# Patient Record
Sex: MALE | Race: WHITE | NOT HISPANIC OR LATINO | Employment: OTHER | URBAN - METROPOLITAN AREA
[De-identification: names, ages, dates, MRNs, and addresses within clinical notes are randomized per-mention and may not be internally consistent; named-entity substitution may affect disease eponyms.]

---

## 2017-01-19 ENCOUNTER — GENERIC CONVERSION - ENCOUNTER (OUTPATIENT)
Dept: OTHER | Facility: OTHER | Age: 57
End: 2017-01-19

## 2018-10-17 LAB
LEFT EYE DIABETIC RETINOPATHY: NORMAL
RIGHT EYE DIABETIC RETINOPATHY: NORMAL
SEVERITY (EYE EXAM): NORMAL

## 2019-03-05 ENCOUNTER — OFFICE VISIT (OUTPATIENT)
Dept: INTERNAL MEDICINE CLINIC | Facility: CLINIC | Age: 59
End: 2019-03-05
Payer: COMMERCIAL

## 2019-03-05 VITALS
HEART RATE: 103 BPM | SYSTOLIC BLOOD PRESSURE: 170 MMHG | OXYGEN SATURATION: 92 % | BODY MASS INDEX: 41.48 KG/M2 | WEIGHT: 225.4 LBS | DIASTOLIC BLOOD PRESSURE: 90 MMHG | HEIGHT: 62 IN

## 2019-03-05 DIAGNOSIS — Z12.5 PROSTATE CANCER SCREENING: ICD-10-CM

## 2019-03-05 DIAGNOSIS — H40.89 OTHER GLAUCOMA OF RIGHT EYE: ICD-10-CM

## 2019-03-05 DIAGNOSIS — Z12.11 COLON CANCER SCREENING: ICD-10-CM

## 2019-03-05 DIAGNOSIS — I11.9 HYPERTENSIVE HEART DISEASE WITHOUT HEART FAILURE: ICD-10-CM

## 2019-03-05 DIAGNOSIS — R21 SKIN RASH: ICD-10-CM

## 2019-03-05 DIAGNOSIS — I10 ESSENTIAL HYPERTENSION: ICD-10-CM

## 2019-03-05 DIAGNOSIS — E11.69 TYPE 2 DIABETES MELLITUS WITH OTHER SPECIFIED COMPLICATION, WITHOUT LONG-TERM CURRENT USE OF INSULIN (HCC): Primary | ICD-10-CM

## 2019-03-05 DIAGNOSIS — J45.20 MILD INTERMITTENT ASTHMA WITHOUT COMPLICATION: ICD-10-CM

## 2019-03-05 PROBLEM — J45.909 ASTHMA: Status: ACTIVE | Noted: 2019-03-05

## 2019-03-05 PROBLEM — E11.9 DIABETES MELLITUS (HCC): Status: ACTIVE | Noted: 2019-03-05

## 2019-03-05 PROCEDURE — 93000 ELECTROCARDIOGRAM COMPLETE: CPT | Performed by: INTERNAL MEDICINE

## 2019-03-05 PROCEDURE — 99204 OFFICE O/P NEW MOD 45 MIN: CPT | Performed by: INTERNAL MEDICINE

## 2019-03-05 PROCEDURE — 3725F SCREEN DEPRESSION PERFORMED: CPT | Performed by: INTERNAL MEDICINE

## 2019-03-05 RX ORDER — MONTELUKAST SODIUM 10 MG/1
10 TABLET ORAL
COMMUNITY
End: 2019-03-06 | Stop reason: SDUPTHER

## 2019-03-05 RX ORDER — METOPROLOL SUCCINATE 50 MG/1
50 TABLET, EXTENDED RELEASE ORAL DAILY
Qty: 90 TABLET | Refills: 3 | Status: SHIPPED | OUTPATIENT
Start: 2019-03-05 | End: 2019-03-11 | Stop reason: HOSPADM

## 2019-03-05 RX ORDER — ASPIRIN 81 MG/1
81 TABLET ORAL DAILY
COMMUNITY

## 2019-03-05 RX ORDER — OMEGA-3-ACID ETHYL ESTERS 1 G/1
2 CAPSULE, LIQUID FILLED ORAL 2 TIMES DAILY
COMMUNITY
End: 2019-04-22 | Stop reason: SDUPTHER

## 2019-03-05 RX ORDER — SILDENAFIL CITRATE 20 MG/1
20 TABLET ORAL 3 TIMES DAILY
Status: ON HOLD | COMMUNITY
End: 2019-03-11 | Stop reason: SDUPTHER

## 2019-03-05 RX ORDER — MELATONIN
1000 DAILY
COMMUNITY
End: 2019-03-06 | Stop reason: SDUPTHER

## 2019-03-05 RX ORDER — LISINOPRIL 20 MG/1
20 TABLET ORAL DAILY
COMMUNITY
End: 2019-03-11 | Stop reason: HOSPADM

## 2019-03-05 NOTE — PATIENT INSTRUCTIONS
A patient who feels well with the exception of rash  However, on examination, blood pressure noted to be uncontrolled  EKG suggests LVH  Recommendations are laboratory data to look at diabetic care along with cholesterol panel, to begin metoprolol 50 mg daily, in to increase lisinopril  Lisinopril should go from 1 tablet daily to 1-1/2 tablets daily for a week, then, 2 tablets daily  Revisit here in 2-3 weeks

## 2019-03-05 NOTE — PROGRESS NOTES
Assessment/Plan:       Diagnoses and all orders for this visit:    Type 2 diabetes mellitus with other specified complication, without long-term current use of insulin (HCC)  -     CBC and differential; Future  -     Lipid Panel with Direct LDL reflex; Future  -     Comprehensive metabolic panel; Future  -     TSH, 3rd generation with Free T4 reflex; Future  -     Urinalysis with reflex to microscopic  -     Microalbumin / creatinine urine ratio  -     Hemoglobin A1C; Future  -     POCT ECG  -     CBC and differential  -     Lipid Panel with Direct LDL reflex  -     Comprehensive metabolic panel  -     TSH, 3rd generation with Free T4 reflex  -     Hemoglobin A1C    Mild intermittent asthma without complication    Essential hypertension  -     CBC and differential; Future  -     Lipid Panel with Direct LDL reflex; Future  -     Comprehensive metabolic panel; Future  -     TSH, 3rd generation with Free T4 reflex; Future  -     Urinalysis with reflex to microscopic  -     Microalbumin / creatinine urine ratio  -     Hemoglobin A1C; Future  -     POCT ECG  -     CBC and differential  -     Lipid Panel with Direct LDL reflex  -     Comprehensive metabolic panel  -     TSH, 3rd generation with Free T4 reflex  -     Hemoglobin A1C    Skin rash    Prostate cancer screening  -     PSA, Total Screen; Future    Colon cancer screening    Other glaucoma of right eye    Other orders  -     metFORMIN (GLUCOPHAGE) 500 mg tablet; Take 500 mg by mouth 2 (two) times a day with meals  -     omega-3-acid ethyl esters (LOVAZA) 1 g capsule; Take 2 g by mouth 2 (two) times a day  -     lisinopril (ZESTRIL) 20 mg tablet; Take 20 mg by mouth daily  -     montelukast (SINGULAIR) 10 mg tablet; Take 10 mg by mouth daily at bedtime  -     aspirin (ECOTRIN LOW STRENGTH) 81 mg EC tablet; Take 81 mg by mouth daily  -     cholecalciferol (VITAMIN D3) 1,000 units tablet; Take 1,000 Units by mouth daily  -     sildenafil (REVATIO) 20 mg tablet;  Take 20 mg by mouth 3 (three) times a day          Patient Instructions   A patient who feels well with the exception of rash  However, on examination, blood pressure noted to be uncontrolled  EKG suggests LVH  Recommendations are laboratory data to look at diabetic care along with cholesterol panel, to begin metoprolol 50 mg daily, in to increase lisinopril  Lisinopril should go from 1 tablet daily to 1-1/2 tablets daily for a week, then, 2 tablets daily  Revisit here in 2-3 weeks  Subjective:      Patient ID: August Renee is a 60 y o  male  Hypertension, diabetes, dyslipidemia, and obesity    A 62-year-old male with the above issues  Although he is asymptomatic, there is work to be done  Blood pressure is uncontrolled  He is on lisinopril 20 mg once daily and I get 180/100  Fortunately, no immediate symptoms are referable to this in the sense of chest pain orthopnea or PND  However, he describes feeling blood coursing through his arteries and his pulse is a bit fast; I think he actually does feel that  He has a hyperdynamic circulation  Rare asthmatic episodes none recently  He does use Singulair as a preventive    , 3 adult children are alive and well  A retired  and   History of gallbladder surgery and bilateral shoulder surgeries  Colonoscopy 10 years ago  Recent hemoglobin A1c reported to be 7    In addition to chronic problems, he developed a scattered maculopapular rash involving his arms and legs about a week ago at the tail end of a trip to [place]  No insect stings or any other provocative agent reported  This area is itchy but he has no systemic symptoms    Family history not very contributory  Both parents are  but with unusual incidence  Mother apparently  after leg trauma provoked DVT and fatal PE  Father   Found dead in his backyard, frozen    Uncertain circumstances although the patient describes it as having had a heart attack  The following portions of the patient's history were reviewed and updated as appropriate:   He has a past medical history of Asthma, Diabetes mellitus ([address] ), and Hypertension  ,  does not have any pertinent problems on file  ,   has a past surgical history that includes Shoulder surgery (2015)  ,  family history includes Cancer in his father; Diabetes in his mother; Hypertension in his mother  ,   reports that he has never smoked  He has never used smokeless tobacco  He reports that he drinks alcohol  He reports that he does not use drugs  ,  has No Known Allergies     Current Outpatient Medications   Medication Sig Dispense Refill    aspirin (ECOTRIN LOW STRENGTH) 81 mg EC tablet Take 81 mg by mouth daily      cholecalciferol (VITAMIN D3) 1,000 units tablet Take 1,000 Units by mouth daily      lisinopril (ZESTRIL) 20 mg tablet Take 20 mg by mouth daily      metFORMIN (GLUCOPHAGE) 500 mg tablet Take 500 mg by mouth 2 (two) times a day with meals      montelukast (SINGULAIR) 10 mg tablet Take 10 mg by mouth daily at bedtime      omega-3-acid ethyl esters (LOVAZA) 1 g capsule Take 2 g by mouth 2 (two) times a day      sildenafil (REVATIO) 20 mg tablet Take 20 mg by mouth 3 (three) times a day       No current facility-administered medications for this visit  Review of Systems   Constitutional: Negative for chills and fever  HENT: Negative for sore throat and trouble swallowing  Eyes: Negative for pain  Respiratory: Negative for cough and shortness of breath  Cardiovascular: Negative for chest pain and palpitations  Gastrointestinal: Negative for abdominal pain, blood in stool, diarrhea, nausea and vomiting  Endocrine: Negative for cold intolerance and heat intolerance  Genitourinary: Negative for dysuria, frequency and testicular pain  Musculoskeletal: Negative for joint swelling  Skin: Positive for rash  Allergic/Immunologic: Negative for immunocompromised state  Neurological: Negative for dizziness, syncope and headaches  Hematological: Negative for adenopathy  Does not bruise/bleed easily  Psychiatric/Behavioral: Negative for dysphoric mood  The patient is not nervous/anxious  Objective:  Vitals:    03/05/19 1506   BP: 170/90   Pulse: 103   SpO2: 92%      Physical Exam   Constitutional: He is oriented to person, place, and time  He appears well-developed and well-nourished  An obese male patient who appears to be stated age   HENT:   Head: Normocephalic and atraumatic  Eyes: Pupils are equal, round, and reactive to light  Conjunctivae are normal    Neck: Normal range of motion  No thyromegaly present  Cardiovascular: Normal rate, regular rhythm and normal heart sounds  No murmur heard  Pulmonary/Chest: Effort normal  No respiratory distress  He has no wheezes  He has no rales  Abdominal: Soft  There is no tenderness  Genitourinary: Penis normal  Right testis shows no mass and no tenderness  Left testis shows no mass and no tenderness  Musculoskeletal: Normal range of motion  He exhibits no deformity  Lymphadenopathy:     He has no cervical adenopathy  Neurological: He is alert and oriented to person, place, and time  Skin: Skin is warm and dry  Rash noted  Scattered maculopapular lesions on the arms and legs  Irregular port wine discoloration of the left foot and medial left ankle region   Psychiatric: He has a normal mood and affect   His behavior is normal  Judgment and thought content normal

## 2019-03-06 DIAGNOSIS — J45.20 MILD INTERMITTENT ASTHMA WITHOUT COMPLICATION: Primary | ICD-10-CM

## 2019-03-06 DIAGNOSIS — E55.9 VITAMIN D DEFICIENCY: ICD-10-CM

## 2019-03-06 RX ORDER — MONTELUKAST SODIUM 10 MG/1
10 TABLET ORAL
Qty: 90 TABLET | Refills: 1 | Status: SHIPPED | OUTPATIENT
Start: 2019-03-06 | End: 2019-04-04 | Stop reason: SDUPTHER

## 2019-03-06 RX ORDER — MELATONIN
1000 DAILY
Qty: 90 TABLET | Refills: 1 | Status: SHIPPED | OUTPATIENT
Start: 2019-03-06 | End: 2021-11-29

## 2019-03-08 LAB
ALBUMIN SERPL-MCNC: 4.3 G/DL (ref 3.5–5.5)
ALBUMIN/GLOB SERPL: 1.5 {RATIO} (ref 1.2–2.2)
ALP SERPL-CCNC: 54 IU/L (ref 39–117)
ALT SERPL-CCNC: 21 IU/L (ref 0–44)
AST SERPL-CCNC: 18 IU/L (ref 0–40)
BASOPHILS # BLD AUTO: 0 X10E3/UL (ref 0–0.2)
BASOPHILS NFR BLD AUTO: 0 %
BILIRUB SERPL-MCNC: 0.3 MG/DL (ref 0–1.2)
BUN SERPL-MCNC: 14 MG/DL (ref 6–24)
BUN/CREAT SERPL: 11 (ref 9–20)
CALCIUM SERPL-MCNC: 9.7 MG/DL (ref 8.7–10.2)
CHLORIDE SERPL-SCNC: 101 MMOL/L (ref 96–106)
CHOLEST SERPL-MCNC: 213 MG/DL (ref 100–199)
CO2 SERPL-SCNC: 25 MMOL/L (ref 20–29)
CREAT SERPL-MCNC: 1.25 MG/DL (ref 0.76–1.27)
EOSINOPHIL # BLD AUTO: 0.6 X10E3/UL (ref 0–0.4)
EOSINOPHIL NFR BLD AUTO: 7 %
ERYTHROCYTE [DISTWIDTH] IN BLOOD BY AUTOMATED COUNT: 13.4 % (ref 12.3–15.4)
EST. AVERAGE GLUCOSE BLD GHB EST-MCNC: 200 MG/DL
GLOBULIN SER-MCNC: 2.8 G/DL (ref 1.5–4.5)
GLUCOSE SERPL-MCNC: 182 MG/DL (ref 65–99)
HBA1C MFR BLD: 8.6 % (ref 4.8–5.6)
HCT VFR BLD AUTO: 45.4 % (ref 37.5–51)
HDLC SERPL-MCNC: 34 MG/DL
HGB BLD-MCNC: 15 G/DL (ref 13–17.7)
IMM GRANULOCYTES # BLD: 0 X10E3/UL (ref 0–0.1)
IMM GRANULOCYTES NFR BLD: 0 %
LDLC SERPL CALC-MCNC: 113 MG/DL (ref 0–99)
LDLC/HDLC SERPL: 3.3 RATIO (ref 0–3.6)
LYMPHOCYTES # BLD AUTO: 3.3 X10E3/UL (ref 0.7–3.1)
LYMPHOCYTES NFR BLD AUTO: 41 %
MCH RBC QN AUTO: 30.1 PG (ref 26.6–33)
MCHC RBC AUTO-ENTMCNC: 33 G/DL (ref 31.5–35.7)
MCV RBC AUTO: 91 FL (ref 79–97)
MONOCYTES # BLD AUTO: 0.5 X10E3/UL (ref 0.1–0.9)
MONOCYTES NFR BLD AUTO: 7 %
NEUTROPHILS # BLD AUTO: 3.7 X10E3/UL (ref 1.4–7)
NEUTROPHILS NFR BLD AUTO: 45 %
PLATELET # BLD AUTO: 277 X10E3/UL (ref 150–379)
POTASSIUM SERPL-SCNC: 4.8 MMOL/L (ref 3.5–5.2)
PROT SERPL-MCNC: 7.1 G/DL (ref 6–8.5)
PSA SERPL-MCNC: 3.5 NG/ML (ref 0–4)
RBC # BLD AUTO: 4.98 X10E6/UL (ref 4.14–5.8)
SL AMB EGFR AFRICAN AMERICAN: 72 ML/MIN/1.73
SL AMB EGFR NON AFRICAN AMERICAN: 63 ML/MIN/1.73
SL AMB VLDL CHOLESTEROL CALC: 66 MG/DL (ref 5–40)
SODIUM SERPL-SCNC: 138 MMOL/L (ref 134–144)
TRIGL SERPL-MCNC: 332 MG/DL (ref 0–149)
TSH SERPL DL<=0.005 MIU/L-ACNC: 1.78 UIU/ML (ref 0.45–4.5)
WBC # BLD AUTO: 8.1 X10E3/UL (ref 3.4–10.8)

## 2019-03-09 ENCOUNTER — APPOINTMENT (EMERGENCY)
Dept: RADIOLOGY | Facility: HOSPITAL | Age: 59
End: 2019-03-09
Payer: COMMERCIAL

## 2019-03-09 ENCOUNTER — TELEPHONE (OUTPATIENT)
Dept: OTHER | Facility: OTHER | Age: 59
End: 2019-03-09

## 2019-03-09 ENCOUNTER — HOSPITAL ENCOUNTER (OUTPATIENT)
Facility: HOSPITAL | Age: 59
Setting detail: OBSERVATION
Discharge: HOME | End: 2019-03-11
Attending: EMERGENCY MEDICINE | Admitting: INTERNAL MEDICINE
Payer: COMMERCIAL

## 2019-03-09 DIAGNOSIS — I10 ESSENTIAL HYPERTENSION: ICD-10-CM

## 2019-03-09 DIAGNOSIS — R07.9 CHEST PAIN, UNSPECIFIED: ICD-10-CM

## 2019-03-09 DIAGNOSIS — R07.9 CHEST PAIN, UNSPECIFIED TYPE: Primary | ICD-10-CM

## 2019-03-09 LAB
ALBUMIN SERPL BCP-MCNC: 4 G/DL (ref 3.5–5)
ALP SERPL-CCNC: 54 U/L (ref 46–116)
ALT SERPL W P-5'-P-CCNC: 32 U/L (ref 12–78)
ANION GAP SERPL CALCULATED.3IONS-SCNC: 8 MMOL/L (ref 4–13)
AST SERPL W P-5'-P-CCNC: 15 U/L (ref 5–45)
ATRIAL RATE: 69 BPM
BASOPHILS # BLD AUTO: 0.05 THOUSANDS/ΜL (ref 0–0.1)
BASOPHILS NFR BLD AUTO: 1 % (ref 0–1)
BILIRUB SERPL-MCNC: 0.2 MG/DL (ref 0.2–1)
BUN SERPL-MCNC: 16 MG/DL (ref 5–25)
CALCIUM SERPL-MCNC: 9.8 MG/DL (ref 8.3–10.1)
CHLORIDE SERPL-SCNC: 102 MMOL/L (ref 100–108)
CO2 SERPL-SCNC: 30 MMOL/L (ref 21–32)
CREAT SERPL-MCNC: 1.26 MG/DL (ref 0.6–1.3)
EOSINOPHIL # BLD AUTO: 0.34 THOUSAND/ΜL (ref 0–0.61)
EOSINOPHIL NFR BLD AUTO: 4 % (ref 0–6)
ERYTHROCYTE [DISTWIDTH] IN BLOOD BY AUTOMATED COUNT: 12.6 % (ref 11.6–15.1)
GFR SERPL CREATININE-BSD FRML MDRD: 62 ML/MIN/1.73SQ M
GLUCOSE SERPL-MCNC: 142 MG/DL (ref 65–140)
HCT VFR BLD AUTO: 41.6 % (ref 36.5–49.3)
HGB BLD-MCNC: 13.9 G/DL (ref 12–17)
IMM GRANULOCYTES # BLD AUTO: 0.01 THOUSAND/UL (ref 0–0.2)
IMM GRANULOCYTES NFR BLD AUTO: 0 % (ref 0–2)
LYMPHOCYTES # BLD AUTO: 3.38 THOUSANDS/ΜL (ref 0.6–4.47)
LYMPHOCYTES NFR BLD AUTO: 44 % (ref 14–44)
MCH RBC QN AUTO: 30.2 PG (ref 26.8–34.3)
MCHC RBC AUTO-ENTMCNC: 33.4 G/DL (ref 31.4–37.4)
MCV RBC AUTO: 90 FL (ref 82–98)
MONOCYTES # BLD AUTO: 0.6 THOUSAND/ΜL (ref 0.17–1.22)
MONOCYTES NFR BLD AUTO: 8 % (ref 4–12)
NEUTROPHILS # BLD AUTO: 3.33 THOUSANDS/ΜL (ref 1.85–7.62)
NEUTS SEG NFR BLD AUTO: 43 % (ref 43–75)
NRBC BLD AUTO-RTO: 0 /100 WBCS
P AXIS: 62 DEGREES
PLATELET # BLD AUTO: 244 THOUSANDS/UL (ref 149–390)
PMV BLD AUTO: 10.7 FL (ref 8.9–12.7)
POTASSIUM SERPL-SCNC: 3.8 MMOL/L (ref 3.5–5.3)
PR INTERVAL: 154 MS
PROT SERPL-MCNC: 7.7 G/DL (ref 6.4–8.2)
QRS AXIS: 78 DEGREES
QRSD INTERVAL: 106 MS
QT INTERVAL: 406 MS
QTC INTERVAL: 435 MS
RBC # BLD AUTO: 4.61 MILLION/UL (ref 3.88–5.62)
SODIUM SERPL-SCNC: 140 MMOL/L (ref 136–145)
T WAVE AXIS: 57 DEGREES
TROPONIN I SERPL-MCNC: <0.02 NG/ML
VENTRICULAR RATE: 69 BPM
WBC # BLD AUTO: 7.71 THOUSAND/UL (ref 4.31–10.16)

## 2019-03-09 PROCEDURE — 93005 ELECTROCARDIOGRAM TRACING: CPT

## 2019-03-09 PROCEDURE — 99285 EMERGENCY DEPT VISIT HI MDM: CPT

## 2019-03-09 PROCEDURE — 82948 REAGENT STRIP/BLOOD GLUCOSE: CPT

## 2019-03-09 PROCEDURE — 85025 COMPLETE CBC W/AUTO DIFF WBC: CPT

## 2019-03-09 PROCEDURE — 84484 ASSAY OF TROPONIN QUANT: CPT

## 2019-03-09 PROCEDURE — 99220 PR INITIAL OBSERVATION CARE/DAY 70 MINUTES: CPT | Performed by: INTERNAL MEDICINE

## 2019-03-09 PROCEDURE — 80053 COMPREHEN METABOLIC PANEL: CPT

## 2019-03-09 PROCEDURE — 36415 COLL VENOUS BLD VENIPUNCTURE: CPT

## 2019-03-09 PROCEDURE — 71046 X-RAY EXAM CHEST 2 VIEWS: CPT

## 2019-03-09 PROCEDURE — 93010 ELECTROCARDIOGRAM REPORT: CPT | Performed by: INTERNAL MEDICINE

## 2019-03-09 RX ORDER — ASPIRIN 81 MG/1
324 TABLET, CHEWABLE ORAL ONCE
Status: COMPLETED | OUTPATIENT
Start: 2019-03-09 | End: 2019-03-09

## 2019-03-09 RX ORDER — LISINOPRIL 20 MG/1
20 TABLET ORAL DAILY
Status: DISCONTINUED | OUTPATIENT
Start: 2019-03-10 | End: 2019-03-10

## 2019-03-09 RX ORDER — ASPIRIN 81 MG/1
81 TABLET ORAL DAILY
Status: DISCONTINUED | OUTPATIENT
Start: 2019-03-10 | End: 2019-03-11 | Stop reason: HOSPADM

## 2019-03-09 RX ORDER — ONDANSETRON 2 MG/ML
4 INJECTION INTRAMUSCULAR; INTRAVENOUS EVERY 6 HOURS PRN
Status: DISCONTINUED | OUTPATIENT
Start: 2019-03-09 | End: 2019-03-11 | Stop reason: HOSPADM

## 2019-03-09 RX ORDER — CHLORAL HYDRATE 500 MG
1000 CAPSULE ORAL DAILY
Status: DISCONTINUED | OUTPATIENT
Start: 2019-03-10 | End: 2019-03-11 | Stop reason: HOSPADM

## 2019-03-09 RX ORDER — ACETAMINOPHEN 325 MG/1
650 TABLET ORAL EVERY 4 HOURS PRN
Status: DISCONTINUED | OUTPATIENT
Start: 2019-03-09 | End: 2019-03-11 | Stop reason: HOSPADM

## 2019-03-09 RX ORDER — DOCUSATE SODIUM 100 MG/1
100 CAPSULE, LIQUID FILLED ORAL 2 TIMES DAILY
Status: DISCONTINUED | OUTPATIENT
Start: 2019-03-09 | End: 2019-03-11 | Stop reason: HOSPADM

## 2019-03-09 RX ORDER — MAGNESIUM HYDROXIDE/ALUMINUM HYDROXICE/SIMETHICONE 120; 1200; 1200 MG/30ML; MG/30ML; MG/30ML
30 SUSPENSION ORAL EVERY 6 HOURS PRN
Status: DISCONTINUED | OUTPATIENT
Start: 2019-03-09 | End: 2019-03-11 | Stop reason: HOSPADM

## 2019-03-09 RX ORDER — HYDRALAZINE HYDROCHLORIDE 20 MG/ML
5 INJECTION INTRAMUSCULAR; INTRAVENOUS EVERY 6 HOURS PRN
Status: DISCONTINUED | OUTPATIENT
Start: 2019-03-09 | End: 2019-03-11 | Stop reason: HOSPADM

## 2019-03-09 RX ORDER — ATORVASTATIN CALCIUM 40 MG/1
40 TABLET, FILM COATED ORAL EVERY EVENING
Status: DISCONTINUED | OUTPATIENT
Start: 2019-03-09 | End: 2019-03-11 | Stop reason: HOSPADM

## 2019-03-09 RX ORDER — MONTELUKAST SODIUM 10 MG/1
10 TABLET ORAL
Status: DISCONTINUED | OUTPATIENT
Start: 2019-03-09 | End: 2019-03-11 | Stop reason: HOSPADM

## 2019-03-09 RX ORDER — NITROGLYCERIN 0.4 MG/1
0.4 TABLET SUBLINGUAL ONCE
Status: COMPLETED | OUTPATIENT
Start: 2019-03-09 | End: 2019-03-09

## 2019-03-09 RX ORDER — METOPROLOL SUCCINATE 50 MG/1
50 TABLET, EXTENDED RELEASE ORAL DAILY
Status: DISCONTINUED | OUTPATIENT
Start: 2019-03-10 | End: 2019-03-11 | Stop reason: HOSPADM

## 2019-03-09 RX ADMIN — ATORVASTATIN CALCIUM 40 MG: 40 TABLET, FILM COATED ORAL at 23:27

## 2019-03-09 RX ADMIN — NITROGLYCERIN 0.4 MG: 0.4 TABLET SUBLINGUAL at 20:56

## 2019-03-09 RX ADMIN — ASPIRIN 81 MG 324 MG: 81 TABLET ORAL at 20:52

## 2019-03-10 ENCOUNTER — APPOINTMENT (OUTPATIENT)
Dept: NON INVASIVE DIAGNOSTICS | Facility: HOSPITAL | Age: 59
End: 2019-03-10
Payer: COMMERCIAL

## 2019-03-10 LAB
ANION GAP SERPL CALCULATED.3IONS-SCNC: 11 MMOL/L (ref 4–13)
ATRIAL RATE: 55 BPM
ATRIAL RATE: 59 BPM
BUN SERPL-MCNC: 15 MG/DL (ref 5–25)
CALCIUM SERPL-MCNC: 8.8 MG/DL (ref 8.3–10.1)
CHLORIDE SERPL-SCNC: 105 MMOL/L (ref 100–108)
CHOLEST SERPL-MCNC: 179 MG/DL (ref 50–200)
CO2 SERPL-SCNC: 27 MMOL/L (ref 21–32)
CREAT SERPL-MCNC: 1.13 MG/DL (ref 0.6–1.3)
ERYTHROCYTE [DISTWIDTH] IN BLOOD BY AUTOMATED COUNT: 12.6 % (ref 11.6–15.1)
GFR SERPL CREATININE-BSD FRML MDRD: 71 ML/MIN/1.73SQ M
GLUCOSE SERPL-MCNC: 120 MG/DL (ref 65–140)
GLUCOSE SERPL-MCNC: 143 MG/DL (ref 65–140)
GLUCOSE SERPL-MCNC: 177 MG/DL (ref 65–140)
GLUCOSE SERPL-MCNC: 183 MG/DL (ref 65–140)
GLUCOSE SERPL-MCNC: 215 MG/DL (ref 65–140)
GLUCOSE SERPL-MCNC: 229 MG/DL (ref 65–140)
HCT VFR BLD AUTO: 38.3 % (ref 36.5–49.3)
HDLC SERPL-MCNC: 32 MG/DL (ref 40–60)
HGB BLD-MCNC: 12.9 G/DL (ref 12–17)
LDLC SERPL CALC-MCNC: 92 MG/DL (ref 0–100)
MAGNESIUM SERPL-MCNC: 1.7 MG/DL (ref 1.6–2.6)
MCH RBC QN AUTO: 30.4 PG (ref 26.8–34.3)
MCHC RBC AUTO-ENTMCNC: 33.7 G/DL (ref 31.4–37.4)
MCV RBC AUTO: 90 FL (ref 82–98)
NONHDLC SERPL-MCNC: 147 MG/DL
P AXIS: 59 DEGREES
P AXIS: 66 DEGREES
PHOSPHATE SERPL-MCNC: 3.5 MG/DL (ref 2.7–4.5)
PLATELET # BLD AUTO: 216 THOUSANDS/UL (ref 149–390)
PMV BLD AUTO: 10.6 FL (ref 8.9–12.7)
POTASSIUM SERPL-SCNC: 3.7 MMOL/L (ref 3.5–5.3)
PR INTERVAL: 158 MS
PR INTERVAL: 178 MS
QRS AXIS: 80 DEGREES
QRS AXIS: 82 DEGREES
QRSD INTERVAL: 102 MS
QRSD INTERVAL: 116 MS
QT INTERVAL: 416 MS
QT INTERVAL: 418 MS
QTC INTERVAL: 397 MS
QTC INTERVAL: 413 MS
RBC # BLD AUTO: 4.25 MILLION/UL (ref 3.88–5.62)
SODIUM SERPL-SCNC: 143 MMOL/L (ref 136–145)
T WAVE AXIS: 63 DEGREES
T WAVE AXIS: 66 DEGREES
TRIGL SERPL-MCNC: 275 MG/DL
TROPONIN I SERPL-MCNC: <0.02 NG/ML
TROPONIN I SERPL-MCNC: <0.02 NG/ML
VENTRICULAR RATE: 55 BPM
VENTRICULAR RATE: 59 BPM
WBC # BLD AUTO: 7.59 THOUSAND/UL (ref 4.31–10.16)

## 2019-03-10 PROCEDURE — 99243 OFF/OP CNSLTJ NEW/EST LOW 30: CPT | Performed by: INTERNAL MEDICINE

## 2019-03-10 PROCEDURE — 93010 ELECTROCARDIOGRAM REPORT: CPT | Performed by: INTERNAL MEDICINE

## 2019-03-10 PROCEDURE — 83735 ASSAY OF MAGNESIUM: CPT | Performed by: NURSE PRACTITIONER

## 2019-03-10 PROCEDURE — 93005 ELECTROCARDIOGRAM TRACING: CPT

## 2019-03-10 PROCEDURE — 84484 ASSAY OF TROPONIN QUANT: CPT | Performed by: NURSE PRACTITIONER

## 2019-03-10 PROCEDURE — 82948 REAGENT STRIP/BLOOD GLUCOSE: CPT

## 2019-03-10 PROCEDURE — 84100 ASSAY OF PHOSPHORUS: CPT | Performed by: NURSE PRACTITIONER

## 2019-03-10 PROCEDURE — 80061 LIPID PANEL: CPT | Performed by: NURSE PRACTITIONER

## 2019-03-10 PROCEDURE — 4010F ACE/ARB THERAPY RXD/TAKEN: CPT | Performed by: INTERNAL MEDICINE

## 2019-03-10 PROCEDURE — 85027 COMPLETE CBC AUTOMATED: CPT | Performed by: NURSE PRACTITIONER

## 2019-03-10 PROCEDURE — 80048 BASIC METABOLIC PNL TOTAL CA: CPT | Performed by: NURSE PRACTITIONER

## 2019-03-10 PROCEDURE — 99232 SBSQ HOSP IP/OBS MODERATE 35: CPT | Performed by: FAMILY MEDICINE

## 2019-03-10 RX ORDER — LISINOPRIL 20 MG/1
40 TABLET ORAL DAILY
Status: DISCONTINUED | OUTPATIENT
Start: 2019-03-11 | End: 2019-03-10

## 2019-03-10 RX ORDER — LISINOPRIL 20 MG/1
20 TABLET ORAL DAILY
Status: DISCONTINUED | OUTPATIENT
Start: 2019-03-11 | End: 2019-03-10

## 2019-03-10 RX ORDER — LISINOPRIL 20 MG/1
20 TABLET ORAL ONCE
Status: COMPLETED | OUTPATIENT
Start: 2019-03-10 | End: 2019-03-10

## 2019-03-10 RX ORDER — LISINOPRIL 20 MG/1
40 TABLET ORAL DAILY
Status: DISCONTINUED | OUTPATIENT
Start: 2019-03-11 | End: 2019-03-11 | Stop reason: HOSPADM

## 2019-03-10 RX ADMIN — MONTELUKAST SODIUM 10 MG: 10 TABLET, FILM COATED ORAL at 21:49

## 2019-03-10 RX ADMIN — ENOXAPARIN SODIUM 40 MG: 40 INJECTION SUBCUTANEOUS at 14:01

## 2019-03-10 RX ADMIN — INSULIN LISPRO 1 UNITS: 100 INJECTION, SOLUTION INTRAVENOUS; SUBCUTANEOUS at 21:50

## 2019-03-10 RX ADMIN — LISINOPRIL 20 MG: 20 TABLET ORAL at 08:20

## 2019-03-10 RX ADMIN — METOPROLOL SUCCINATE 50 MG: 50 TABLET, EXTENDED RELEASE ORAL at 08:20

## 2019-03-10 RX ADMIN — ATORVASTATIN CALCIUM 40 MG: 40 TABLET, FILM COATED ORAL at 17:22

## 2019-03-10 RX ADMIN — ASPIRIN 81 MG: 81 TABLET, COATED ORAL at 08:20

## 2019-03-10 RX ADMIN — Medication 1000 MG: at 08:20

## 2019-03-10 RX ADMIN — INSULIN LISPRO 2 UNITS: 100 INJECTION, SOLUTION INTRAVENOUS; SUBCUTANEOUS at 17:21

## 2019-03-10 RX ADMIN — LISINOPRIL 20 MG: 20 TABLET ORAL at 17:22

## 2019-03-10 RX ADMIN — INSULIN LISPRO 2 UNITS: 100 INJECTION, SOLUTION INTRAVENOUS; SUBCUTANEOUS at 14:02

## 2019-03-10 NOTE — TELEPHONE ENCOUNTER
Kavita Bradley 1960  CONFIDENTIALTY NOTICE: This fax transmission is intended only for the addressee  It contains information that is legally privileged,  confidential or otherwise protected from use or disclosure  If you are not the intended recipient, you are strictly prohibited from reviewing,  disclosing, copying using or disseminating any of this information or taking any action in reliance on or regarding this information  If you have  received this fax in error, please notify us immediately by telephone so that we can arrange for its return to us  Page:   Call Id: [id number]  Health Call  Standard Call Report  Health Call  Patient Name: Kavita Bradley  Gender: Male  : 1960  Age: 60 Y 2 M 16 D  Return Phone  Number: [phone] (Current)  Address: [address]  City/State/[facility]: [place] [address]  Practice Name: [facility]:  Physician:  Blas Morales Name:  Relationship To  Patient: Self  Return Phone Number: [phone] (Current)  Presenting Problem: " Blood Pressure is over 180 "  Service Type: Triage  Charged Service 1: Messages  Pharmacy Name and  Number:  Nurse Assessment  Protocols  Protocol Title Nurse Date/Time  Disp  Time Disposition Final User  3/9/2019 8:25:32 PM Send to Follow Up Zeny Montano RN, [place]  3/9/2019 9:36:06 PM Close Yes [place] Form  Comments  User: Aneudy Escalona RN Date/Time: 3/9/2019 8:25:15 PM   Received a voice mail message  The mailbox is full and a message can not be left  User: Aneudy Escalona RN Date/Time: 3/9/2019 9:36:00 PM  Several attempts have been made to call this person back and received the voicemail box is full message  Call again @  and  was told he is in the ER

## 2019-03-10 NOTE — H&P
[address] Internal Medicine  H&P- Leo Page 1960, 60 y o  male MRN: [id number]    Unit/Bed#: ED 09 Encounter: [id number]    Primary Care Provider: Jose Cheng MD   Date and time admitted to hospital: 3/9/2019  7:49 PM        * Chest pain  Assessment & Plan  · Patient reports strange chest sensations/palpitations, potentially in the setting of hypertensive urgency  · ACS rule out pathway  · Cardiology consulted  · Echo ordered  · Troponin are negative, continue to trend  · EKG is negative  · KELLY score of 2  · Cardiac diet  · Monitor on telemetry  · Lipid panel in a m , started on atorvastatin  · Continue aspirin and metoprolol    Hypertension  Assessment & Plan  · Hypertensive urgency on admission, improving  · Continue home medications  · PRN hydralazine  · Monitor closely    Hypertensive heart disease without heart failure  Assessment & Plan  · Continue home medications    Diabetes mellitus ([facility] [address] )  Assessment & Plan  Lab Results   Component Value Date    HGBA1C 8 6 (H) 03/07/2019       No results for input(s): POCGLU in the last 72 hours  Blood Sugar Average: Last 72 hrs:     Hold metformin while inpatient  Carb/cardiac controlled diet  Sliding scale with fingersticks       VTE Prophylaxis: Enoxaparin (Lovenox)  / sequential compression device   Code Status:  Full code  POLST: POLST form is not discussed and not completed at this time  Discussion with family:  Wife at bedside    Anticipated Length of Stay:  Patient will be admitted on an Observation basis with an anticipated length of stay of  < 2 midnights  Justification for Hospital Stay:  Further evaluation of patient's palpitations, trending troponins, echocardiogram    Total Time for Visit, including Counseling / Coordination of Care: 45 minutes  Greater than 50% of this total time spent on direct patient counseling and coordination of care      Chief Complaint:   Chest palpitations/discomfort    History of Present Illness:    Suzanne Dejesus Jenna Larson is a 60 y o  male who presents with palpitations/chest discomfort  Patient reports these experiences sensation in the past however it has been relatively constant today  Patient complaining of palpitations and discomfort however not chest pain chest tightness shortness of breath or difficulty breathing  He reports that he did not take any of his home medications today  He was recently seen by his PCP and was found to be hypertensive in the office and was started on metoprolol  He has been taking metoprolol for 3 days  Did not take medication today  He is hypertensive in the ED  He denies any headache lightheadedness dizziness blurry vision  He reports back pain but denies any arm neck or jaw pain  Describes his back pain is some muscle tightness  Review of Systems:    Review of Systems   Constitutional: Negative  HENT: Negative  Eyes: Negative  Respiratory: Negative for chest tightness, shortness of breath and wheezing  Cardiovascular: Positive for palpitations  Negative for chest pain and leg swelling  Gastrointestinal: Negative  Endocrine: Negative  Genitourinary: Negative  Musculoskeletal: Negative  Skin: Negative  Allergic/Immunologic: Negative  Neurological: Negative  Hematological: Negative  Psychiatric/Behavioral: Negative  All other systems reviewed and are negative  Past Medical and Surgical History:     Past Medical History:   Diagnosis Date    Asthma     Diabetes mellitus ([facility] [address] )     Hypertension        Past Surgical History:   Procedure Laterality Date    CHOLECYSTECTOMY      SHOULDER SURGERY  2015       Meds/Allergies:    Prior to Admission medications    Medication Sig Start Date End Date Taking?  Authorizing Provider   aspirin (ECOTRIN LOW STRENGTH) 81 mg EC tablet Take 81 mg by mouth daily   Yes Historical Provider, MD   cholecalciferol (VITAMIN D3) 1,000 units tablet Take 1 tablet (1,000 Units total) by mouth daily 3/6/19  Yes Carlin Dias MD   lisinopril (ZESTRIL) 20 mg tablet Take 20 mg by mouth daily   Yes Historical Provider, MD   metFORMIN (GLUCOPHAGE) 500 mg tablet Take 500 mg by mouth 2 (two) times a day with meals   Yes Historical Provider, MD   metoprolol succinate (TOPROL XL) 50 mg 24 hr tablet Take 1 tablet (50 mg total) by mouth daily 3/5/19  Yes Carlin Dias MD   montelukast (SINGULAIR) 10 mg tablet Take 1 tablet (10 mg total) by mouth daily at bedtime 3/6/19  Yes Carlin Dias MD   [id number] ethyl esters (LOVAZA) 1 g capsule Take 2 g by mouth 2 (two) times a day   Yes Historical Provider, MD   sildenafil (REVATIO) 20 mg tablet Take 20 mg by mouth 3 (three) times a day   Yes Historical Provider, MD     I have reviewed home medications with patient personally  Allergies: No Known Allergies    Social History:     Marital Status: /Civil Union   Patient Pre-hospital Living Situation:  Private residence  Patient Pre-hospital Level of Mobility:  Independent  Patient Pre-hospital Diet Restrictions:  None  Substance Use History:   Social History     Substance and Sexual Activity   Alcohol Use Never    Frequency: Monthly or less    Drinks per session: 1 or 2     Social History     Tobacco Use   Smoking Status Never Smoker   Smokeless Tobacco Never Used     Social History     Substance and Sexual Activity   Drug Use Never       Family History:    Family History   Problem Relation Age of Onset    Hypertension Mother     Diabetes Mother     Cancer Father        Physical Exam:     Vitals:   Blood Pressure: 148/85 (03/09/19 2200)  Pulse: 60 (03/09/19 2200)  Temperature: 98 °F (36 7 °C) (03/09/19 1948)  Temp Source: Oral (03/09/19 1948)  Respirations: 17 (03/09/19 2200)  Height: 5' 9" (175 3 cm) (03/09/19 1948)  SpO2: 96 % (03/09/19 2200)    Physical Exam   Constitutional: He is oriented to person, place, and time  He appears well-developed and well-nourished  HENT:   Head: Normocephalic     Eyes: Pupils are equal, round, and reactive to light  No scleral icterus  Neck: Normal range of motion  Cardiovascular: Normal rate and regular rhythm  No murmur heard  Pulmonary/Chest: Effort normal and breath sounds normal    Abdominal: Soft  Bowel sounds are normal  He exhibits no distension  There is no tenderness  Neurological: He is alert and oriented to person, place, and time  Skin: Skin is warm and dry  Psychiatric: He has a normal mood and affect  His behavior is normal  Judgment and thought content normal    Nursing note and vitals reviewed  Additional Data:     Lab Results: I have personally reviewed pertinent reports  Results from last 7 days   Lab Units 03/09/19 2009   WBC Thousand/uL 7 71   HEMOGLOBIN g/dL 13 9   HEMATOCRIT % 41 6   PLATELETS Thousands/uL 244   NEUTROS PCT % 43   LYMPHS PCT % 44   MONOS PCT % 8   EOS PCT % 4     Results from last 7 days   Lab Units 03/09/19 2009   SODIUM mmol/L 140   POTASSIUM mmol/L 3 8   CHLORIDE mmol/L 102   CO2 mmol/L 30   BUN mg/dL 16   CREATININE mg/dL 1 26   ANION GAP mmol/L 8   CALCIUM mg/dL 9 8   ALBUMIN g/dL 4 0   TOTAL BILIRUBIN mg/dL 0 20   ALK PHOS U/L 54   ALT U/L 32   AST U/L 15   GLUCOSE RANDOM mg/dL 142*             Results from last 7 days   Lab Units 03/07/19  1010   HEMOGLOBIN A1C % 8 6*           Imaging: I have personally reviewed pertinent reports  X-ray chest 2 views    (Results Pending)       EKG, Pathology, and Other Studies Reviewed on Admission:   · EKG:  Normal sinus rhythm    Allscripts / Epic Records Reviewed: Yes     ** Please Note: This note has been constructed using a voice recognition system   **

## 2019-03-10 NOTE — ED PROVIDER NOTES
History  Chief Complaint   Patient presents with    Chest Pain     c/o "funny feeling" to left side states began this AM and has had elevated BP systolically in the 911I, denies HA, dizziness, nausea, on took BP meds last night as scheduled      59 y/o male here for chest discomfort  States it's not a pain and just feels like a "funny sensation"  Described as a pulsating  Began about 3 hours ago  Constant since then  Does not radiate  No SOB, n/v, diaphoresis, dizziness  No leg pain or swelling  No h/o PE or DVT  No risk factors for PE  He has never had this pain before  Nothing he does makes the sensation better or worse  pmhx of DM, HTN, HLD  Notes his BP has been consistently elevated over the past 1-2 days despite being recently increased on lisinopril as well as being started on metoprolol  History provided by:  Patient   used: No    Chest Pain   Pain location:  L chest  Pain quality: shooting    Pain radiates to:  Does not radiate  Pain radiates to the back: no    Pain severity:  Mild  Onset quality:  Gradual  Duration:  3 hours  Timing:  Constant  Progression:  Unchanged  Chronicity:  New  Context: at rest    Context: not breathing, no drug use, not eating, no intercourse, not lifting, no movement, not raising an arm, no stress and no trauma    Relieved by:  Nothing  Worsened by:  Nothing tried  Ineffective treatments:  None tried  Associated symptoms: no abdominal pain, no AICD problem, no altered mental status, no anorexia, no anxiety, no back pain, no claudication, no cough, no diaphoresis, no dizziness, no dysphagia, no fatigue, no fever, no headache, no heartburn, no lower extremity edema, no nausea, no near-syncope, no numbness, no palpitations, no shortness of breath, not vomiting and no weakness    Risk factors: diabetes mellitus, high cholesterol, hypertension and male sex        Prior to Admission Medications   Prescriptions Last Dose Informant Patient Reported? Taking? aspirin (ECOTRIN LOW STRENGTH) 81 mg EC tablet  Self Yes Yes   Sig: Take 81 mg by mouth daily   cholecalciferol (VITAMIN D3) 1,000 units tablet   No Yes   Sig: Take 1 tablet (1,000 Units total) by mouth daily   lisinopril (ZESTRIL) 20 mg tablet  Self Yes Yes   Sig: Take 20 mg by mouth daily   metFORMIN (GLUCOPHAGE) 500 mg tablet  Self Yes Yes   Sig: Take 500 mg by mouth 2 (two) times a day with meals   metoprolol succinate (TOPROL XL) 50 mg 24 hr tablet   No Yes   Sig: Take 1 tablet (50 mg total) by mouth daily   montelukast (SINGULAIR) 10 mg tablet   No Yes   Sig: Take 1 tablet (10 mg total) by mouth daily at bedtime   omega-3-acid ethyl esters (LOVAZA) 1 g capsule  Self Yes Yes   Sig: Take 2 g by mouth 2 (two) times a day   sildenafil (REVATIO) 20 mg tablet  Self Yes Yes   Sig: Take 20 mg by mouth 3 (three) times a day      Facility-Administered Medications: None       Past Medical History:   Diagnosis Date    Asthma     Diabetes mellitus ([facility] [address] )     Hypertension        Past Surgical History:   Procedure Laterality Date    CHOLECYSTECTOMY      SHOULDER SURGERY  2015       Family History   Problem Relation Age of Onset    Hypertension Mother     Diabetes Mother     Cancer Father      I have reviewed and agree with the history as documented  Social History     Tobacco Use    Smoking status: Never Smoker    Smokeless tobacco: Never Used   Substance Use Topics    Alcohol use: Never     Frequency: Monthly or less     Drinks per session: 1 or 2    Drug use: Never        Review of Systems   Constitutional: Negative for activity change, appetite change, chills, diaphoresis, fatigue, fever and unexpected weight change  HENT: Negative for congestion, rhinorrhea, sinus pressure, sore throat and trouble swallowing  Eyes: Negative for photophobia and visual disturbance  Respiratory: Negative for apnea, cough, choking, chest tightness, shortness of breath, wheezing and stridor      Cardiovascular: Positive for chest pain  Negative for palpitations, claudication, leg swelling and near-syncope  Gastrointestinal: Negative for abdominal distention, abdominal pain, anorexia, blood in stool, constipation, diarrhea, heartburn, nausea and vomiting  Genitourinary: Negative for decreased urine volume, difficulty urinating, dysuria, enuresis, flank pain, frequency, hematuria and urgency  Musculoskeletal: Negative for arthralgias, back pain, myalgias, neck pain and neck stiffness  Skin: Negative for color change, pallor, rash and wound  Allergic/Immunologic: Negative  Neurological: Negative for dizziness, tremors, syncope, weakness, light-headedness, numbness and headaches  Hematological: Negative  Psychiatric/Behavioral: Negative  All other systems reviewed and are negative  Physical Exam  Physical Exam   Constitutional: He is oriented to person, place, and time  He appears well-developed and well-nourished  Non-toxic appearance  He does not have a sickly appearance  He does not appear ill  No distress  HENT:   Head: Normocephalic and atraumatic  Eyes: Pupils are equal, round, and reactive to light  EOM and lids are normal    Neck: Normal range of motion  Neck supple  Cardiovascular: Normal rate, regular rhythm, S1 normal, S2 normal, normal heart sounds, intact distal pulses and normal pulses  Exam reveals no gallop, no distant heart sounds, no friction rub and no decreased pulses  No murmur heard  Pulses:       Radial pulses are 2+ on the right side, and 2+ on the left side  Pulmonary/Chest: Effort normal and breath sounds normal  No accessory muscle usage  No apnea, no tachypnea and no bradypnea  No respiratory distress  He has no decreased breath sounds  He has no wheezes  He has no rhonchi  He has no rales  Pain not reproducible    Abdominal: Soft  Normal appearance  He exhibits no distension  There is no tenderness  There is no rigidity, no rebound and no guarding     Musculoskeletal: Normal range of motion  He exhibits no edema, tenderness or deformity  Neurological: He is alert and oriented to person, place, and time  No cranial nerve deficit  GCS eye subscore is 4  GCS verbal subscore is 5  GCS motor subscore is 6  GCS 15  AAOx3  Ambulating in department without difficulty  CN II-XII grossly intact  No focal neuro deficits  Skin: Skin is warm, dry and intact  No rash noted  He is not diaphoretic  No erythema  No pallor  Psychiatric: His speech is normal    Nursing note and vitals reviewed        Vital Signs  ED Triage Vitals [03/09/19 1948]   Temperature Pulse Respirations Blood Pressure SpO2   98 °F (36 7 °C) 64 18 (!) 234/115 97 %      Temp Source Heart Rate Source Patient Position - Orthostatic VS BP Location FiO2 (%)   Oral Monitor Sitting Left arm --      Pain Score       No Pain           Vitals:    03/09/19 2100 03/09/19 2115 03/09/19 2130 03/09/19 2200   BP: 160/82 140/83 139/83 148/85   Pulse: 73 69 60 60   Patient Position - Orthostatic VS: Sitting Sitting Sitting Sitting       Visual Acuity      ED Medications  Medications   insulin lispro (HumaLOG) 100 units/mL subcutaneous injection 1-6 Units (has no administration in time range)   insulin lispro (HumaLOG) 100 units/mL subcutaneous injection 1-6 Units (has no administration in time range)   atorvastatin (LIPITOR) tablet 40 mg (has no administration in time range)   docusate sodium (COLACE) capsule 100 mg (has no administration in time range)   ondansetron (ZOFRAN) injection 4 mg (has no administration in time range)   aluminum-magnesium hydroxide-simethicone (MYLANTA) 200-200-20 mg/5 mL oral suspension 30 mL (has no administration in time range)   acetaminophen (TYLENOL) tablet 650 mg (has no administration in time range)   hydrALAZINE (APRESOLINE) injection 5 mg (has no administration in time range)   nitroglycerin (NITROSTAT) SL tablet 0 4 mg (0 4 mg Sublingual Given 3/9/19 2056)   aspirin chewable tablet 324 mg (324 mg Oral Given 3/9/19 2052)       Diagnostic Studies  Results Reviewed     Procedure Component Value Units Date/Time    Troponin I [[id number]]     Lab Status:  No result Specimen:  Blood     Troponin I [[id number]]  (Normal) Collected:  03/09/19 2009    Lab Status:  Final result Specimen:  Blood from Arm, Left Updated:  03/09/19 2038     Troponin I <0 02 ng/mL     Comprehensive metabolic panel [[id number]]  (Abnormal) Collected:  03/09/19 2009    Lab Status:  Final result Specimen:  Blood from Arm, Left Updated:  03/09/19 2034     Sodium 140 mmol/L      Potassium 3 8 mmol/L      Chloride 102 mmol/L      CO2 30 mmol/L      ANION GAP 8 mmol/L      BUN 16 mg/dL      Creatinine 1 26 mg/dL      Glucose 142 mg/dL      Calcium 9 8 mg/dL      AST 15 U/L      ALT 32 U/L      Alkaline Phosphatase 54 U/L      Total Protein 7 7 g/dL      Albumin 4 0 g/dL      Total Bilirubin 0 20 mg/dL      eGFR 62 ml/min/1 73sq m     Narrative:       National Kidney Disease Education Program recommendations are as follows:  GFR calculation is accurate only with a steady state creatinine  Chronic Kidney disease less than 60 ml/min/1 73 sq  meters  Kidney failure less than 15 ml/min/1 73 sq  meters      CBC and differential [[id number]] Collected:  03/09/19 2009    Lab Status:  Final result Specimen:  Blood from Arm, Left Updated:  03/09/19 2015     WBC 7 71 Thousand/uL      RBC 4 61 Million/uL      Hemoglobin 13 9 g/dL      Hematocrit 41 6 %      MCV 90 fL      MCH 30 2 pg      MCHC 33 4 g/dL      RDW 12 6 %      MPV 10 7 fL      Platelets 708 Thousands/uL      nRBC 0 /100 WBCs      Neutrophils Relative 43 %      Immat GRANS % 0 %      Lymphocytes Relative 44 %      Monocytes Relative 8 %      Eosinophils Relative 4 %      Basophils Relative 1 %      Neutrophils Absolute 3 33 Thousands/µL      Immature Grans Absolute 0 01 Thousand/uL      Lymphocytes Absolute 3 38 Thousands/µL      Monocytes Absolute 0 60 Thousand/µL      Eosinophils Absolute 0 34 Thousand/µL      Basophils Absolute 0 05 Thousands/µL                  X-ray chest 2 views    (Results Pending)              Procedures  Procedures       Phone Contacts  ED Phone Contact    ED Course                       KELLY Risk Score      Most Recent Value   Age >= 72  0 Filed at: 03/09/2019 2112   Known CAD (stenosis >= 50%)  0 Filed at: 03/09/2019 2112   Recent (<=24 hrs) Service Angina  0 Filed at: 03/09/2019 2112   ST Deviation >= 0 5 mm  0 Filed at: 03/09/2019 2112   3+ CAD Risk Factors (FHx, HTN, HLP, DM, Smoker)  1 Filed at: 03/09/2019 2112   Aspirin Use Past 7 Days  1 Filed at: 03/09/2019 2112   Elevated Cardiac Markers  0 Filed at: 03/09/2019 2112   KELLY Risk Score (Calculated)  2 Filed at: 03/09/2019 2112              MDM  Number of Diagnoses or Management Options  Chest pain, unspecified: new and requires workup  Diagnosis management comments: DDX including but not limited to: ACS, MI, PE, PTX, pneumonia, dissection, pleurisy, pericarditis, myocarditis, rhabdomyolysis, GI etiology  Plan: cardiac workup  Likely cardiac obs  Amount and/or Complexity of Data Reviewed  Clinical lab tests: ordered and reviewed  Tests in the radiology section of CPT®: ordered and reviewed  Independent visualization of images, tracings, or specimens: yes    Risk of Complications, Morbidity, and/or Mortality  Presenting problems: moderate  Management options: low  General comments: 59 y/o male patient here for evaluation of chest discomfort  Pain relieved after nitro  BP improved  Resting comfortably  EKG nonspecific, otherwise workup is unremarkable  Vitals stable  Given risk factors and this being the first occasion he reports this pain, I did discuss admission for observation  He agrees  Stable at time of admit  Spoke with Joie Ty NP for admission       Patient Progress  Patient progress: stable      Disposition  Final diagnoses:   Chest pain, unspecified     Time reflects when diagnosis was documented in both MDM as applicable and the Disposition within this note     Time User Action Codes Description Comment    3/9/2019  9:33 PM Karole Breach Add [R07 9] Chest pain, unspecified type     3/9/2019  9:33 PM Jeremyole Breach Modify [R07 9] Chest pain, unspecified type     3/9/2019  9:42 PM Viola Murphy Add [R07 9] Chest pain, unspecified       ED Disposition     ED Disposition Condition Date/Time Comment    Admit Stable Sat Mar 9, 2019  9:42 PM Case was discussed with Luis M Parker and the patient's admission status was agreed to be Admission Status: observation status to the service of Dr Luis M Parker  Follow-up Information    None         Patient's Medications   Discharge Prescriptions    No medications on file     No discharge procedures on file      ED Provider  Electronically Signed by           Rivera Quinteros PA-C  03/09/19 8063

## 2019-03-10 NOTE — ASSESSMENT & PLAN NOTE
Lab Results   Component Value Date    HGBA1C 8 6 (H) 03/07/2019       Recent Labs     03/09/19  2324 03/10/19  0540   POCGLU 120 143*       Blood Sugar Average: Last 72 hrs:  (P) 131 5   Hold metformin while inpatient  Carb/cardiac controlled diet  Sliding scale with fingersticks

## 2019-03-10 NOTE — ASSESSMENT & PLAN NOTE
· Hypertensive urgency on admission, improving  · Continue home medications  · PRN hydralazine  · Monitor closely

## 2019-03-10 NOTE — ASSESSMENT & PLAN NOTE
· Hypertensive urgency on admission, improving  · Discussed with Cardiology    Will increase lisinopril to 40 mg  · PRN hydralazine  · Monitor closely

## 2019-03-10 NOTE — UTILIZATION REVIEW
Initial Clinical Review    Admission: Date/Time/Statement: OBS 3/9   2138  Orders Placed This Encounter   Procedures    Place in Observation     Standing Status:   Standing     Number of Occurrences:   1     Order Specific Question:   Admitting Physician     Answer:   Isis Wray [[id number]]     Order Specific Question:   Level of Care     Answer:   Med Surg [16]     ED: Date/Time/Mode of Arrival:   ED Arrival Information     Expected Arrival Acuity Means of Arrival Escorted By Service Admission Type    - 3/9/2019 19:44 Emergent [facility] Medicine Emergency    Arrival Complaint    Chest pain        Chief Complaint:   Chief Complaint   Patient presents with    Chest Pain     c/o "funny feeling" to left side states began this AM and has had elevated BP systolically in the 718M, denies HA, dizziness, nausea, on took BP meds last night as scheduled      Assessment/Plan:58 yp male to ED from home w/ chest discomfort   C/o palpitations , SOB and difficulty breathing   Saw PCP , found to be hypertensive and started on lopressor        Chest pain  Assessment & Plan  · Patient reports strange chest sensations/palpitations, potentially in the setting of hypertensive urgency  · ACS rule out pathway  · Cardiology consulted  · Echo ordered  · Troponin are negative, continue to trend  · EKG is negative  · KELLY score of 2  · Cardiac diet  · Monitor on telemetry  · Lipid panel in a m , started on atorvastatin  · Continue aspirin and metoprololHypertension  Assessment & Plan  · Hypertensive urgency on admission, improving  · Continue home medications  · PRN hydralazine  · Monitor closely   Hypertensive heart disease without heart failure  Assessment & Plan  · Continue home medications   Diabetes mellitus ([facility] [address] )  Assessment & Plan        Lab Results   Component Value Date     HGBA1C 8 6 (H) 03/07/2019    o results for input(s): POCGLU in the last 72 hours    Blood Sugar Average: Last 72 hrs:   Hold metformin while inpatient  Carb/cardiac controlled diet  Sliding scale with fingersticks       ED Vital Signs:   ED Triage Vitals [03/09/19 1948]   Temperature Pulse Respirations Blood Pressure SpO2   98 °F (36 7 °C) 64 18 (!) 234/115 97 %      Temp Source Heart Rate Source Patient Position - Orthostatic VS BP Location FiO2 (%)   Oral Monitor Sitting Left arm --      Pain Score       No Pain        Wt Readings from Last 1 Encounters:   03/09/19 102 kg (224 lb 6 9 oz)     Vital Signs (abnormal):  wnl   Pertinent Labs/Diagnostic Test Results: gluc  142  EKG- NSR     ED Treatment:   Medication Administration from 03/09/2019 1944 to 03/09/2019 2224       Date/Time Order Dose Route Action Action by Comments     03/09/2019 2056 nitroglycerin (NITROSTAT) SL tablet 0 4 mg 0 4 mg Sublingual Given Brenda Boothe RN      03/09/2019 2052 aspirin chewable tablet 324 mg 324 mg Oral Given Brenda Boothe RN         Past Medical/Surgical History:    Active Ambulatory Problems     Diagnosis Date Noted    Diabetes mellitus ([facility] ) 03/05/2019    Asthma 03/05/2019    Hypertension 03/05/2019    Skin rash 03/05/2019    Prostate cancer screening 03/05/2019    Colon cancer screening 03/05/2019    Other specified glaucoma 03/05/2019    Hypertensive heart disease without heart failure 03/05/2019     Past Medical History:   Diagnosis Date    Asthma     Diabetes mellitus ([facility] )     Hypertension      Admitting Diagnosis: Chest pain, unspecified [R07 9]  Chest pain [R07 9]  Chest pain, unspecified type [R07 9]  Age/Sex: 60 y o  male  Admission Orders:  Scheduled Meds:   Current Facility-Administered Medications:  acetaminophen 650 mg Oral Q4H PRN    aluminum-magnesium hydroxide-simethicone 30 mL Oral Q6H PRN    aspirin 81 mg Oral Daily    atorvastatin 40 mg Oral QPM    docusate sodium 100 mg Oral BID    fish oil 1,000 mg Oral Daily    hydrALAZINE 5 mg Intravenous Q6H PRN    insulin lispro 1-6 Units Subcutaneous TID AC    insulin lispro 1-6 Units Subcutaneous HS    lisinopril 20 mg Oral Daily    metoprolol succinate 50 mg Oral Daily    montelukast 10 mg Oral HS    ondansetron 4 mg Intravenous Q6H PRN      Fingerstick ac and hs  EKG w/ 2nd and 3rd trop   IS   amb pt   Cardiac diet   Cardiology consult   Tele   EKG prn cp   Serial trop - all wnl   3/10 cbc , lipid profile , phos , mg , bmp     IM 3/10  HTN crisis w/ multiple risk factors needs stress echo on Monday

## 2019-03-10 NOTE — ASSESSMENT & PLAN NOTE
· Patient reports strange chest sensations/palpitations, potentially in the setting of hypertensive urgency  · ACS rule out pathway  · Cardiology consult appreciated  Discussed with Dr Jen Blackwell    Patient for stress echo tomorrow  · Echo ordered  · Troponin are negative, continue to trend  · EKG is negative  · KELLY score of 2  · Cardiac diet  · Monitor on telemetry  · Lipid panel in a m , started on atorvastatin  · Continue aspirin and metoprolol

## 2019-03-10 NOTE — PLAN OF CARE
Problem: Potential for Falls  Goal: Patient will remain free of falls  Description  INTERVENTIONS:  - Assess patient frequently for physical needs  -  Identify cognitive and physical deficits and behaviors that affect risk of falls    -  [place] fall precautions as indicated by assessment   - Educate patient/family on patient safety including physical limitations  - Instruct patient to call for assistance with activity based on assessment  - Modify environment to reduce risk of injury  - Consider OT/PT consult to assist with strengthening/mobility  Outcome: Progressing     Problem: Prexisting or High Potential for Compromised Skin Integrity  Goal: Skin integrity is maintained or improved  Description  INTERVENTIONS:  - Identify patients at risk for skin breakdown  - Assess and monitor skin integrity  - Assess and monitor nutrition and hydration status  - Monitor labs (i e  albumin)  - Assess for incontinence   - Turn and reposition patient  - Assist with mobility/ambulation  - Relieve pressure over bony prominences  - Avoid friction and shearing  - Provide appropriate hygiene as needed including keeping skin clean and dry  - Evaluate need for skin moisturizer/barrier cream  - Collaborate with interdisciplinary team (i e  Nutrition, Rehabilitation, etc )   - Patient/family teaching  Outcome: Progressing     Problem: PAIN - ADULT  Goal: Verbalizes/displays adequate comfort level or baseline comfort level  Description  Interventions:  - Encourage patient to monitor pain and request assistance  - Assess pain using appropriate pain scale  - Administer analgesics based on type and severity of pain and evaluate response  - Implement non-pharmacological measures as appropriate and evaluate response  - Consider cultural and social influences on pain and pain management  - Notify physician/advanced practitioner if interventions unsuccessful or patient reports new pain  Outcome: Progressing     Problem: INFECTION - ADULT  Goal: Absence or prevention of progression during hospitalization  Description  INTERVENTIONS:  - Assess and monitor for signs and symptoms of infection  - Monitor lab/diagnostic results  - Monitor all insertion sites, i e  indwelling lines, tubes, and drains  - Monitor endotracheal (as able) and nasal secretions for changes in amount and color  - [place] appropriate cooling/warming therapies per order  - Administer medications as ordered  - Instruct and encourage patient and family to use good hand hygiene technique  - Identify and instruct in appropriate isolation precautions for identified infection/condition  Outcome: Progressing     Problem: SAFETY ADULT  Goal: Patient will remain free of falls  Description  INTERVENTIONS:  - Assess patient frequently for physical needs  -  Identify cognitive and physical deficits and behaviors that affect risk of falls    -  [place] fall precautions as indicated by assessment   - Educate patient/family on patient safety including physical limitations  - Instruct patient to call for assistance with activity based on assessment  - Modify environment to reduce risk of injury  - Consider OT/PT consult to assist with strengthening/mobility  Outcome: Progressing  Goal: Maintain or return to baseline ADL function  Description  INTERVENTIONS:  -  Assess patient's ability to carry out ADLs; assess patient's baseline for ADL function and identify physical deficits which impact ability to perform ADLs (bathing, care of mouth/teeth, toileting, grooming, dressing, etc )  - Assess/evaluate cause of self-care deficits   - Assess range of motion  - Assess patient's mobility; develop plan if impaired  - Assess patient's need for assistive devices and provide as appropriate  - Encourage maximum independence but intervene and supervise when necessary  ¯ Involve family in performance of ADLs  ¯ Assess for home care needs following discharge   ¯ Request OT consult to assist with ADL evaluation and planning for discharge  ¯ Provide patient education as appropriate  Outcome: Progressing  Goal: Maintain or return mobility status to optimal level  Description  INTERVENTIONS:  - Assess patient's baseline mobility status (ambulation, transfers, stairs, etc )    - Identify cognitive and physical deficits and behaviors that affect mobility  - Identify mobility aids required to assist with transfers and/or ambulation (gait belt, sit-to-stand, lift, walker, cane, etc )  - [place] fall precautions as indicated by assessment  - Record patient progress and toleration of activity level on Mobility SBAR; progress patient to next Phase/Stage  - Instruct patient to call for assistance with activity based on assessment  - Request Rehabilitation consult to assist with strengthening/weightbearing, etc   Outcome: Progressing     Problem: DISCHARGE PLANNING  Goal: Discharge to home or other facility with appropriate resources  Description  INTERVENTIONS:  - Identify barriers to discharge w/patient and caregiver  - Arrange for needed discharge resources and transportation as appropriate  - Identify discharge learning needs (meds, wound care, etc )  - Arrange for interpretive services to assist at discharge as needed  - Refer to Case Management Department for coordinating discharge planning if the patient needs post-hospital services based on physician/advanced practitioner order or complex needs related to functional status, cognitive ability, or social support system  Outcome: Progressing     Problem: Knowledge Deficit  Goal: Patient/family/caregiver demonstrates understanding of disease process, treatment plan, medications, and discharge instructions  Description  Complete learning assessment and assess knowledge base    Interventions:  - Provide teaching at level of understanding  - Provide teaching via preferred learning methods  Outcome: Progressing

## 2019-03-10 NOTE — ASSESSMENT & PLAN NOTE
· Patient reports strange chest sensations/palpitations, potentially in the setting of hypertensive urgency  · ACS rule out pathway  · Cardiology consulted  · Echo ordered  · Troponin are negative, continue to trend  · EKG is negative  · KELLY score of 2  · Cardiac diet  · Monitor on telemetry  · Lipid panel in a m , started on atorvastatin  · Continue aspirin and metoprolol

## 2019-03-10 NOTE — PROGRESS NOTES
Progress Note Alexis Quintero 1960, 60 y o  male MRN: [id number]    Unit/Bed#: -01 Encounter: [id number]    Primary Care Provider: Sven Ly MD   Date and time admitted to hospital: 3/9/2019  7:49 PM        Hypertensive heart disease without heart failure  Assessment & Plan  · Continue home medications    Hypertension  Assessment & Plan  · Hypertensive urgency on admission, improving  · Discussed with Cardiology  Will increase lisinopril to 40 mg  · PRN hydralazine  · Monitor closely    Diabetes mellitus [facility])  Assessment & Plan  Lab Results   Component Value Date    HGBA1C 8 6 (H) 03/07/2019       Recent Labs     03/09/19  2324 03/10/19  0540   POCGLU 120 143*       Blood Sugar Average: Last 72 hrs:  (P) 131 5   Hold metformin while inpatient  Carb/cardiac controlled diet  Sliding scale with fingersticks    * Chest pain  Assessment & Plan  · Patient reports strange chest sensations/palpitations, potentially in the setting of hypertensive urgency  · ACS rule out pathway  · Cardiology consult appreciated  Discussed with Dr Juan Montesinos  Patient for stress echo tomorrow  · Echo ordered  · Troponin are negative, continue to trend  · EKG is negative  · KELLY score of 2  · Cardiac diet  · Monitor on telemetry  · Lipid panel in a m , started on atorvastatin  · Continue aspirin and metoprolol        VTE Pharmacologic Prophylaxis:   Pharmacologic: Enoxaparin (Lovenox)  Mechanical VTE Prophylaxis in Place: Yes    Patient Centered Rounds: I have performed bedside rounds with nursing staff today  Discussions with Specialists or Other Care Team Provider:  Discussed with Cardiology    Education and Discussions with Family / Patient:  Wife at the bedside    Time Spent for Care: 20 minutes  More than 50% of total time spent on counseling and coordination of care as described above      Current Length of Stay: 0 day(s)    Current Patient Status: Observation   Certification Statement: The patient, admitted on an observation basis, will now require > 2 midnight hospital stay due to Having hypertensive crisis with multiple risk factors in need of a stress echocardiogram    Discharge Plan:  Anticipate discharge tomorrow after stress test    Code Status: Level 1 - Full Code      Subjective:   Patient seen examined  Feels okay at this time  Denies any chest pressure    Objective:     Vitals:   Temp (24hrs), Av 9 °F (36 6 °C), Min:97 52 °F (36 4 °C), Max:98 1 °F (36 7 °C)    Temp:  [97 52 °F (36 4 °C)-98 1 °F (36 7 °C)] 97 52 °F (36 4 °C)  HR:  [55-73] 55  Resp:  [16-20] 18  BP: (139-234)/() 150/85  SpO2:  [94 %-98 %] 98 %  Body mass index is 33 14 kg/m²       Input and Output Summary (last 24 hours):     No intake or output data in the 24 hours ending 03/10/19 1211    Physical Exam:     Physical Exam  (   General Appearance:    Alert, cooperative, no distress, appears stated age                               Lungs:     Clear to auscultation bilaterally, respirations unlabored       Heart:    Regular rate and rhythm, S1 and S2 normal, no murmur, rub    or gallop   Abdomen:     Soft, non-tender, bowel sounds active all four quadrants,     no masses, no organomegaly           Extremities:   Extremities normal, atraumatic, no cyanosis or edema       Additional Data:     Labs:    Results from last 7 days   Lab Units 03/10/19  0444 03/09/19  2009   WBC Thousand/uL 7 59 7 71   HEMOGLOBIN g/dL 12 9 13 9   HEMATOCRIT % 38 3 41 6   PLATELETS Thousands/uL 216 244   NEUTROS PCT %  --  43   LYMPHS PCT %  --  44   MONOS PCT %  --  8   EOS PCT %  --  4     Results from last 7 days   Lab Units 03/10/19  0444 03/09/19  2009   SODIUM mmol/L 143 140   POTASSIUM mmol/L 3 7 3 8   CHLORIDE mmol/L 105 102   CO2 mmol/L 27 30   BUN mg/dL 15 16   CREATININE mg/dL 1 13 1 26   ANION GAP mmol/L 11 8   CALCIUM mg/dL 8 8 9 8   ALBUMIN g/dL  --  4 0   TOTAL BILIRUBIN mg/dL  --  0 20   ALK PHOS U/L  --  54   ALT U/L  --  32   AST U/L  --  15 GLUCOSE RANDOM mg/dL 183* 142*         Results from last 7 days   Lab Units 03/10/19  0540 03/09/19  2324   POC GLUCOSE mg/dl 143* 120     Results from last 7 days   Lab Units 03/07/19  1010   HEMOGLOBIN A1C % 8 6*               * I Have Reviewed All Lab Data Listed Above  * Additional Pertinent Lab Tests Reviewed: Judy Aguilar Admission Reviewed        Recent Cultures (last 7 days):           Last 24 Hours Medication List:     Current Facility-Administered Medications:  acetaminophen 650 mg Oral Q4H PRN MILADY Harp   aluminum-magnesium hydroxide-simethicone 30 mL Oral Q6H PRN [place] Barone, MILADY   aspirin 81 mg Oral Daily [place] Lizabeth, MILADY   atorvastatin 40 mg Oral QPM [place] Barone, MILADY   docusate sodium 100 mg Oral BID [place] Lizabeth, MILADY   fish oil 1,000 mg Oral Daily [place] Lizabeth, MILADY   hydrALAZINE 5 mg Intravenous Q6H PRN MILADY Harp   insulin lispro 1-6 Units Subcutaneous TID AC MILADY Crews   insulin lispro 1-6 Units Subcutaneous HS MILADY Harp   [START ON 3/11/2019] lisinopril 20 mg Oral Daily Rishi Sauceda MD   metoprolol succinate 50 mg Oral Daily MILADY Harp   montelukast 10 mg Oral HS MILADY Crews   ondansetron 4 mg Intravenous Q6H PRN MILADY Harp        Today, Patient Was Seen By: Rishi Sauceda MD    ** Please Note: Dictation voice to text software may have been used in the creation of this document   **

## 2019-03-10 NOTE — RESPIRATORY THERAPY NOTE
RT Protocol Note  Cornelio Gamboa 60 y o  male MRN: [id number]  Unit/Bed#: -01 Encounter: [id number]    Assessment    Principal Problem:    Chest pain  Active Problems:    Diabetes mellitus ([place] [address] )    Hypertension    Hypertensive heart disease without heart failure      Home Pulmonary Medications:  none       Past Medical History:   Diagnosis Date    Asthma     Diabetes mellitus ([facility] )     Hypertension      Social History     Socioeconomic History    Marital status: /Civil Union     Spouse name: None    Number of children: None    Years of education: None    Highest education level: None   Occupational History    None   Social Needs    Financial resource strain: None    Food insecurity:     Worry: None     Inability: None    Transportation needs:     Medical: None     Non-medical: None   Tobacco Use    Smoking status: Never Smoker    Smokeless tobacco: Never Used   Substance and Sexual Activity    Alcohol use: Yes     Frequency: Monthly or less     Drinks per session: 1 or 2    Drug use: Never    Sexual activity: None   Lifestyle    Physical activity:     Days per week: None     Minutes per session: None    Stress: None   Relationships    Social connections:     Talks on phone: None     Gets together: None     Attends [employer] service: None     Active member of club or organization: None     Attends meetings of clubs or organizations: None     Relationship status: None    Intimate partner violence:     Fear of current or ex partner: None     Emotionally abused: None     Physically abused: None     Forced sexual activity: None   Other Topics Concern    None   Social History Narrative    None       Subjective  Pt offers no respiratory complaints at this time         Objective    Physical Exam:   Assessment Type: Assess only  General Appearance: Awake, Alert  Respiratory Pattern: Normal  Chest Assessment: Chest expansion symmetrical  Bilateral Breath Sounds: Clear  O2 Device: RA    Vitals:  Blood pressure 156/89, pulse 60, temperature 98 1 °F (36 7 °C), temperature source Oral, resp  rate 18, height 5' 9" (1 753 m), weight 102 kg (224 lb 6 9 oz), SpO2 95 %  Imaging and other studies: I have personally reviewed pertinent reports        O2 Device: RA     Plan    Respiratory Plan: No distress/Pulmonary history, Discontinue Protocol

## 2019-03-10 NOTE — ASSESSMENT & PLAN NOTE
Lab Results   Component Value Date    HGBA1C 8 6 (H) 03/07/2019       No results for input(s): POCGLU in the last 72 hours      Blood Sugar Average: Last 72 hrs:     Hold metformin while inpatient  Carb/cardiac controlled diet  Sliding scale with fingersticks

## 2019-03-10 NOTE — CONSULTS
Consultation - Cardiology   Ac Thompson 60 y o  male MRN: [id number]  Unit/Bed#: -01 Encounter: [id number]    Assessment/Plan     Assessment:  Hypertensive emergency  Chest pain, relieved with ASA/nitro/BP control  DM2      Plan:  Chest pain could be a manifestation of extremely high blood pressure versus unstable angina  Continue aggressive blood pressure control  Increase lisinopril to 40 mg daily  If BP continues to be a challenge, change toprol XL to carvedilol for improved BP activity  Plan for non-invasive ischemia workup tomorrow (stress echocardiogram)  History of Present Illness   Physician Requesting Consult: Connie Cheng MD     Reason for Consult / Principal Problem: Chest pain, hypertensive emergency    HPI: 61 y/o male with known HTN, difficult to control as an outpatient, DM2 admitted with hypertensive emergency and chest pain  Chest pain relieved with sublingual nitro glycerin and ASA  BP is better controlled this AM but still elevated  No known family history  No prior cardiac events  Troponins negative x3  Inpatient consult to Cardiology  Consult performed by: Amisha Yeboah DO  Consult ordered by: MILADY Magdaleno          Review of Systems   Constitutional: Negative  Respiratory: Positive for shortness of breath  Cardiovascular: Positive for chest pain  Gastrointestinal: Negative  Endocrine: Negative  Musculoskeletal: Negative  Skin: Negative  Neurological: Negative  Hematological: Negative          Historical Information   Past Medical History:   Diagnosis Date    Asthma     Diabetes mellitus ([address] )     Hypertension      Past Surgical History:   Procedure Laterality Date    CHOLECYSTECTOMY      SHOULDER SURGERY  2015     Social History     Substance and Sexual Activity   Alcohol Use Yes    Frequency: Monthly or less    Drinks per session: 1 or 2    Comment: Liquor, wine     Social History     Substance and Sexual Activity   Drug Use Never     Social History     Tobacco Use   Smoking Status Never Smoker   Smokeless Tobacco Never Used     Family History: Reviewed, non-contributory  Meds/Allergies    Home Medications:  No current facility-administered medications on file prior to encounter        Current Outpatient Medications on File Prior to Encounter   Medication Sig Dispense Refill    aspirin (ECOTRIN LOW STRENGTH) 81 mg EC tablet Take 81 mg by mouth daily      cholecalciferol (VITAMIN D3) 1,000 units tablet Take 1 tablet (1,000 Units total) by mouth daily 90 tablet 1    lisinopril (ZESTRIL) 20 mg tablet Take 20 mg by mouth daily      metFORMIN (GLUCOPHAGE) 500 mg tablet Take 500 mg by mouth 2 (two) times a day with meals      metoprolol succinate (TOPROL XL) 50 mg 24 hr tablet Take 1 tablet (50 mg total) by mouth daily 90 tablet 3    montelukast (SINGULAIR) 10 mg tablet Take 1 tablet (10 mg total) by mouth daily at bedtime 90 tablet 1    omega-3-acid ethyl esters (LOVAZA) 1 g capsule Take 2 g by mouth 2 (two) times a day      sildenafil (REVATIO) 20 mg tablet Take 20 mg by mouth 3 (three) times a day         Hospital Medications:  Scheduled Meds:  Current Facility-Administered Medications:  acetaminophen 650 mg Oral Q4H PRN [place] Broody, CRNP   aluminum-magnesium hydroxide-simethicone 30 mL Oral Q6H PRN [place] Broody, CRNP   aspirin 81 mg Oral Daily [place] Broody, CRNP   atorvastatin 40 mg Oral QPM MILADY Crews   docusate sodium 100 mg Oral BID [place] Broody, CRNP   fish oil 1,000 mg Oral Daily [place] Broody, CRNP   hydrALAZINE 5 mg Intravenous Q6H PRN [place] Broody, CRNP   insulin lispro 1-6 Units Subcutaneous TID AC MILADY Crews   insulin lispro 1-6 Units Subcutaneous HS [place] Broody, MILADY   lisinopril 20 mg Oral Daily [place] Broody, CRNP   metoprolol succinate 50 mg Oral Daily [place] Broody, CRNP   montelukast 10 mg Oral HS MILADY Crews   ondansetron 4 mg Intravenous Q6H PRN MILADY Bennett     Continuous Infusions:   PRN Meds:   acetaminophen    aluminum-magnesium hydroxide-simethicone    hydrALAZINE    ondansetron      No Known Allergies    Objective   Vitals: Blood pressure 150/85, pulse 55, temperature 97 52 °F (36 4 °C), resp  rate 18, height 5' 9" (1 753 m), weight 102 kg (224 lb 6 9 oz), SpO2 98 %  Physical Exam   Constitutional: He appears well-developed and well-nourished  HENT:   Head: Normocephalic and atraumatic  Neck: No JVD present  Carotid bruit is not present  No thyroid mass and no thyromegaly present  Cardiovascular: Normal rate, regular rhythm, S1 normal and S2 normal   No extrasystoles are present  Exam reveals no gallop  No murmur heard  Pulses:       Carotid pulses are 2+ on the right side, and 2+ on the left side  Radial pulses are 2+ on the right side, and 2+ on the left side  Femoral pulses are 2+ on the right side, and 2+ on the left side  Dorsalis pedis pulses are 2+ on the right side, and 2+ on the left side  Posterior tibial pulses are 2+ on the right side, and 2+ on the left side  Pulmonary/Chest: Effort normal and breath sounds normal    Abdominal: Soft  Normal appearance and bowel sounds are normal  He exhibits no distension and no ascites  There is no tenderness  Musculoskeletal: He exhibits no edema  Lymphadenopathy:     He has no cervical adenopathy  Neurological: He is alert  No cranial nerve deficit  GCS eye subscore is 4  GCS verbal subscore is 5  GCS motor subscore is 6  Skin: Skin is warm and dry  Psychiatric: He has a normal mood and affect   His speech is normal and behavior is normal  Judgment and thought content normal  Cognition and memory are normal        Lab Results:  Results for Ginny Barnett (MRN [id number]) as of 3/10/2019 09:35   3/10/2019 04:44   Sodium 143   Potassium 3 7   Chloride 105   CO2 27   Anion Gap 11   BUN 15   Creatinine 1 13   Glucose, Random 183 (H) Calcium 8 8   eGFR 71   Phosphorus 3 5   Magnesium 1 7     Results for Richardine Knife (MRN [id number]) as of 3/10/2019 09:35   3/10/2019 04:44   WBC 7 59   Red Blood Cell Count 4 25   Hemoglobin 12 9   HCT 38 3   MCV 90   MCH 30 4   MCHC 33 7   RDW 12 6   Platelet Count 530     Results for Richardine Knife (MRN [id number]) as of 3/10/2019 09:35   3/10/2019 04:44   Cholesterol 179   Triglycerides 275 (H)   HDL 32 (L)   Non-HDL Cholesterol 147   LDL Direct 92     Results for Richardine Knife (MRN [id number]) as of 3/10/2019 09:35   3/9/2019 20:09 3/10/2019 00:57 3/10/2019 04:44   Troponin I <0 02 <0 02 <0 02         EKG 3/9/2019:   Normal sinus rhythm  Suspect lead V1 and V2 reversal   Otherwise normal ECG  No previous ECGs available  Confirmed by Cecilio De Paz ([id number]) on 3/9/2019 9:06:12 PM      Counseling / Coordination of Care  Total floor / unit time spent today 30 minutes  Greater than 50% of total time was spent with the patient and / or family counseling and / or coordination of care

## 2019-03-11 ENCOUNTER — APPOINTMENT (OUTPATIENT)
Dept: NON INVASIVE DIAGNOSTICS | Facility: HOSPITAL | Age: 59
End: 2019-03-11
Payer: COMMERCIAL

## 2019-03-11 ENCOUNTER — APPOINTMENT (OUTPATIENT)
Dept: NON INVASIVE DIAGNOSTICS | Facility: HOSPITAL | Age: 59
End: 2019-03-11
Attending: FAMILY MEDICINE
Payer: COMMERCIAL

## 2019-03-11 VITALS
DIASTOLIC BLOOD PRESSURE: 86 MMHG | OXYGEN SATURATION: 93 % | HEIGHT: 69 IN | WEIGHT: 224.43 LBS | HEART RATE: 82 BPM | TEMPERATURE: 98.6 F | RESPIRATION RATE: 18 BRPM | BODY MASS INDEX: 33.24 KG/M2 | SYSTOLIC BLOOD PRESSURE: 129 MMHG

## 2019-03-11 LAB
ARRHY DURING EX: NORMAL
CHEST PAIN STATEMENT: NORMAL
GLUCOSE SERPL-MCNC: 163 MG/DL (ref 65–140)
GLUCOSE SERPL-MCNC: 190 MG/DL (ref 65–140)
GLUCOSE SERPL-MCNC: 93 MG/DL (ref 65–140)
MAX DIASTOLIC BP: 70 MMHG
MAX HEART RATE: 151 BPM
MAX PREDICTED HEART RATE: 161 BPM
MAX. SYSTOLIC BP: 196 MMHG
PROTOCOL NAME: NORMAL
TARGET HR FORMULA: NORMAL
TEST INDICATION: NORMAL
TIME IN EXERCISE PHASE: NORMAL

## 2019-03-11 PROCEDURE — 99217 PR OBSERVATION CARE DISCHARGE MANAGEMENT: CPT | Performed by: FAMILY MEDICINE

## 2019-03-11 PROCEDURE — 93351 STRESS TTE COMPLETE: CPT | Performed by: INTERNAL MEDICINE

## 2019-03-11 PROCEDURE — 82948 REAGENT STRIP/BLOOD GLUCOSE: CPT

## 2019-03-11 PROCEDURE — 93306 TTE W/DOPPLER COMPLETE: CPT | Performed by: INTERNAL MEDICINE

## 2019-03-11 PROCEDURE — 93350 STRESS TTE ONLY: CPT

## 2019-03-11 PROCEDURE — 93306 TTE W/DOPPLER COMPLETE: CPT

## 2019-03-11 RX ORDER — LISINOPRIL 40 MG/1
40 TABLET ORAL DAILY
Qty: 30 TABLET | Refills: 0 | Status: SHIPPED | OUTPATIENT
Start: 2019-03-12 | End: 2019-04-04 | Stop reason: SDUPTHER

## 2019-03-11 RX ORDER — METOPROLOL SUCCINATE 25 MG/1
25 TABLET, EXTENDED RELEASE ORAL DAILY
Qty: 30 TABLET | Refills: 0 | Status: SHIPPED | OUTPATIENT
Start: 2019-03-12 | End: 2019-03-12 | Stop reason: CLARIF

## 2019-03-11 RX ORDER — SILDENAFIL CITRATE 20 MG/1
TABLET ORAL
Refills: 0
Start: 2019-03-11 | End: 2019-07-11 | Stop reason: SDUPTHER

## 2019-03-11 RX ADMIN — LISINOPRIL 40 MG: 20 TABLET ORAL at 09:11

## 2019-03-11 RX ADMIN — Medication 1000 MG: at 09:11

## 2019-03-11 RX ADMIN — ENOXAPARIN SODIUM 40 MG: 40 INJECTION SUBCUTANEOUS at 09:12

## 2019-03-11 RX ADMIN — ASPIRIN 81 MG: 81 TABLET, COATED ORAL at 09:11

## 2019-03-11 RX ADMIN — INSULIN LISPRO 1 UNITS: 100 INJECTION, SOLUTION INTRAVENOUS; SUBCUTANEOUS at 09:13

## 2019-03-11 NOTE — ASSESSMENT & PLAN NOTE
Lab Results   Component Value Date    HGBA1C 8 6 (H) 03/07/2019       Recent Labs     03/10/19  1539 03/10/19  2055 03/11/19  0615 03/11/19  1126   POCGLU 215* 177* 163* 190*       Blood Sugar Average: Last 72 hrs:  (P) 590 [id number]   Hold metformin while inpatient  Carb/cardiac controlled diet  Sliding scale with fingersticks  Patient is not under good control  Will need to follow up with family doctor

## 2019-03-11 NOTE — DISCHARGE SUMMARY
Discharge- Ac Thompson 1960, 60 y o  male MRN: [id number]    Unit/Bed#: -01 Encounter: [id number]    Primary Care Provider: Griffin العلي MD   Date and time admitted to hospital: 3/9/2019  7:49 PM        Hypertensive heart disease without heart failure  Assessment & Plan  · Continue home medications    Hypertension  Assessment & Plan  · Hypertensive urgency on admission, improving  · Discussed with Cardiology  Will increase lisinopril to 40 mg  Will decrease the Toprol-XL to 25 mg daily  · PRN hydralazine  · Monitor closely    Diabetes mellitus [facility])  Assessment & Plan  Lab Results   Component Value Date    HGBA1C 8 6 (H) 03/07/2019       Recent Labs     03/10/19  1539 03/10/19  2055 03/11/19  0615 03/11/19  1126   POCGLU 215* 177* 163* 190*       Blood Sugar Average: Last 72 hrs:  (P) 343 [id number]   Hold metformin while inpatient  Carb/cardiac controlled diet  Sliding scale with fingersticks  Patient is not under good control  Will need to follow up with family doctor  * Chest pain  Assessment & Plan  · Patient reports strange chest sensations/palpitations, potentially in the setting of hypertensive urgency  · ACS rule out pathway  · Cardiology consult appreciated  Discussed with Dr Ji Bonilla    Patient for stress echo tomorrow  · Echo ordered  · Troponin are negative, continue to trend  · EKG is negative  · KELLY score of 2  · Very atypical   Discharge after stress echo today      Discharging Physician / Practitioner: Connie Cheng MD  PCP: Griffin العلي MD  Admission Date:   Admission Orders (From admission, onward)    Ordered        03/09/19 2138  Place in Observation  Once     Order ID Start Status   [id number] 03/09/19 2132 Completed              Discharge Date: 03/11/19    Resolved Problems  Date Reviewed: 3/11/2019    None          Consultations During Hospital Stay:  · [facility] Cardiology    Procedures Performed:   · Echo stress test is normal   Echocardiogram report pending but the stress echo was normal  Significant Findings / Test Results:   · Accelerated hypertension    Incidental Findings:   · None     Test Results Pending at Discharge (will require follow up): · None     Outpatient Tests Requested:  · None    Complications:  None    Reason for Admission:  Chest palpitation    Hospital Course: Alexi Shane is a 60 y o  male patient who originally presented to the hospital on 3/9/2019 due to chest palpitation    History of presenting Gene Awan is a 60 y o  male who presents with palpitations/chest discomfort  Patient reports these experiences sensation in the past however it has been relatively constant today  Patient complaining of palpitations and discomfort however not chest pain chest tightness shortness of breath or difficulty breathing  He reports that he did not take any of his home medications today  He was recently seen by his PCP and was found to be hypertensive in the office and was started on metoprolol  He has been taking metoprolol for 3 days  Did not take medication today  He is hypertensive in the ED  He denies any headache lightheadedness dizziness blurry vision  He reports back pain but denies any arm neck or jaw pain  Describes his back pain is some muscle tightness  Hospital course:  Patient's cardiac enzymes are negative over here  This was somewhat atypical in nature however given his accelerated hypertension and risk factors with diabetes it was decided that the patient should get a stress echo done as an inpatient per Dr Ricki Mckeon since recommendations  If negative the patient could be discharged  As far as patient's blood pressure goes some been under better control here but not ideal   The patient was put on his lisinopril and made it 40 mg daily from 20 mg daily    The patient also was put on Toprol XL 50 mg daily for which she was on at home but he was little bradycardic in the 50s to that is going to be cut down to 25 mg on discharge  Patient still has intermittent palpitations for which they need to consider maybe a Holter monitor something like that as an outpatient but nothing further needs to be done here in the hospital   The patient's blood sugars are not under optimal control but that could be handled as an outpatient with family doctor as well  Rest of vital signs remained stable patient is medically stable for discharge        Please see above list of diagnoses and related plan for additional information  Condition at Discharge: fair     Discharge Day Visit / Exam:     Subjective:  Patient seen examined  Gets intermittent palpitations but otherwise doing okay  Vitals: Blood Pressure: 159/89 (03/11/19 1128)  Pulse: 65 (03/11/19 1128)  Temperature: 98 06 °F (36 7 °C) (03/11/19 1128)  Temp Source: Oral (03/09/19 2227)  Respirations: 20 (03/11/19 1128)  Height: 5' 9" (175 3 cm) (03/09/19 2227)  Weight - Scale: 102 kg (224 lb 6 9 oz) (03/09/19 2227)  SpO2: 95 % (03/11/19 1128)  Exam:   Physical Exam  (   General Appearance:    Alert, cooperative, no distress, appears stated age                               Lungs:     Clear to auscultation bilaterally, respirations unlabored       Heart:    Regular rate and rhythm, S1 and S2 normal, no murmur, rub    or gallop   Abdomen:     Soft, non-tender, bowel sounds active all four quadrants,     no masses, no organomegaly           Extremities:   Extremities normal, atraumatic, no cyanosis or edema     Discussion with Family:  Wife at the bedside    Discharge instructions/Information to patient and family:   See after visit summary for information provided to patient and family  Provisions for Follow-Up Care:  See after visit summary for information related to follow-up care and any pertinent home health orders        Disposition:     Home    For Discharges to [facility]:   · Not Applicable to this Patient - Not Applicable to this Patient    Planned Readmission:  None anticipated     Discharge Statement:  I spent 30 minutes discharging the patient  This time was spent on the day of discharge  I had direct contact with the patient on the day of discharge  Greater than 50% of the total time was spent examining patient, answering all patient questions, arranging and discussing plan of care with patient as well as directly providing post-discharge instructions  Additional time then spent on discharge activities  Discharge Medications:  See after visit summary for reconciled discharge medications provided to patient and family        ** Please Note: This note has been constructed using a voice recognition system **

## 2019-03-11 NOTE — PROGRESS NOTES
Pt woke up complaining of a "fluttering" feeling in his chest  Pt noted to be sinus rhythm on telemetry, denying any chest pain  /97, HR 69, O2 95% on room air  Pt advised to report any changes, chest pain or discomfort  Pt resting comfortably in bed  Will continue to monitor     Trenton Barcenas RN  6:09 AM  03/11/19  ======================================================

## 2019-03-11 NOTE — PLAN OF CARE
Problem: Potential for Falls  Goal: Patient will remain free of falls  Description  INTERVENTIONS:  - Assess patient frequently for physical needs  -  Identify cognitive and physical deficits and behaviors that affect risk of falls    -  [place] fall precautions as indicated by assessment   - Educate patient/family on patient safety including physical limitations  - Instruct patient to call for assistance with activity based on assessment  - Modify environment to reduce risk of injury  - Consider OT/PT consult to assist with strengthening/mobility  Outcome: Progressing     Problem: Prexisting or High Potential for Compromised Skin Integrity  Goal: Skin integrity is maintained or improved  Description  INTERVENTIONS:  - Identify patients at risk for skin breakdown  - Assess and monitor skin integrity  - Assess and monitor nutrition and hydration status  - Monitor labs (i e  albumin)  - Assess for incontinence   - Turn and reposition patient  - Assist with mobility/ambulation  - Relieve pressure over bony prominences  - Avoid friction and shearing  - Provide appropriate hygiene as needed including keeping skin clean and dry  - Evaluate need for skin moisturizer/barrier cream  - Collaborate with interdisciplinary team (i e  Nutrition, Rehabilitation, etc )   - Patient/family teaching  Outcome: Progressing     Problem: PAIN - ADULT  Goal: Verbalizes/displays adequate comfort level or baseline comfort level  Description  Interventions:  - Encourage patient to monitor pain and request assistance  - Assess pain using appropriate pain scale  - Administer analgesics based on type and severity of pain and evaluate response  - Implement non-pharmacological measures as appropriate and evaluate response  - Consider cultural and social influences on pain and pain management  - Notify physician/advanced practitioner if interventions unsuccessful or patient reports new pain  Outcome: Progressing     Problem: INFECTION - ADULT  Goal: Absence or prevention of progression during hospitalization  Description  INTERVENTIONS:  - Assess and monitor for signs and symptoms of infection  - Monitor lab/diagnostic results  - Monitor all insertion sites, i e  indwelling lines, tubes, and drains  - Monitor endotracheal (as able) and nasal secretions for changes in amount and color  - [place] appropriate cooling/warming therapies per order  - Administer medications as ordered  - Instruct and encourage patient and family to use good hand hygiene technique  - Identify and instruct in appropriate isolation precautions for identified infection/condition  Outcome: Progressing     Problem: SAFETY ADULT  Goal: Patient will remain free of falls  Description  INTERVENTIONS:  - Assess patient frequently for physical needs  -  Identify cognitive and physical deficits and behaviors that affect risk of falls    -  [place] fall precautions as indicated by assessment   - Educate patient/family on patient safety including physical limitations  - Instruct patient to call for assistance with activity based on assessment  - Modify environment to reduce risk of injury  - Consider OT/PT consult to assist with strengthening/mobility  Outcome: Progressing  Goal: Maintain or return to baseline ADL function  Description  INTERVENTIONS:  -  Assess patient's ability to carry out ADLs; assess patient's baseline for ADL function and identify physical deficits which impact ability to perform ADLs (bathing, care of mouth/teeth, toileting, grooming, dressing, etc )  - Assess/evaluate cause of self-care deficits   - Assess range of motion  - Assess patient's mobility; develop plan if impaired  - Assess patient's need for assistive devices and provide as appropriate  - Encourage maximum independence but intervene and supervise when necessary  ¯ Involve family in performance of ADLs  ¯ Assess for home care needs following discharge   ¯ Request OT consult to assist with ADL evaluation and planning for discharge  ¯ Provide patient education as appropriate  Outcome: Progressing  Goal: Maintain or return mobility status to optimal level  Description  INTERVENTIONS:  - Assess patient's baseline mobility status (ambulation, transfers, stairs, etc )    - Identify cognitive and physical deficits and behaviors that affect mobility  - Identify mobility aids required to assist with transfers and/or ambulation (gait belt, sit-to-stand, lift, walker, cane, etc )  - [place] fall precautions as indicated by assessment  - Record patient progress and toleration of activity level on Mobility SBAR; progress patient to next Phase/Stage  - Instruct patient to call for assistance with activity based on assessment  - Request Rehabilitation consult to assist with strengthening/weightbearing, etc   Outcome: Progressing     Problem: DISCHARGE PLANNING  Goal: Discharge to home or other facility with appropriate resources  Description  INTERVENTIONS:  - Identify barriers to discharge w/patient and caregiver  - Arrange for needed discharge resources and transportation as appropriate  - Identify discharge learning needs (meds, wound care, etc )  - Arrange for interpretive services to assist at discharge as needed  - Refer to Case Management Department for coordinating discharge planning if the patient needs post-hospital services based on physician/advanced practitioner order or complex needs related to functional status, cognitive ability, or social support system  Outcome: Progressing     Problem: Knowledge Deficit  Goal: Patient/family/caregiver demonstrates understanding of disease process, treatment plan, medications, and discharge instructions  Description  Complete learning assessment and assess knowledge base    Interventions:  - Provide teaching at level of understanding  - Provide teaching via preferred learning methods  Outcome: Progressing     Problem: CARDIOVASCULAR - ADULT  Goal: Maintains optimal cardiac output and hemodynamic stability  Description  INTERVENTIONS:  - Monitor I/O, vital signs and rhythm  - Monitor for S/S and trends of decreased cardiac output i e  bleeding, hypotension  - Administer and titrate ordered vasoactive medications to optimize hemodynamic stability  - Assess quality of pulses, skin color and temperature  - Assess for signs of decreased coronary artery perfusion - ex   Angina  - Instruct patient to report change in severity of symptoms  Outcome: Progressing  Goal: Absence of cardiac dysrhythmias or at baseline rhythm  Description  INTERVENTIONS:  - Continuous cardiac monitoring, monitor vital signs, obtain 12 lead EKG if indicated  - Administer antiarrhythmic and heart rate control medications as ordered  - Monitor electrolytes and administer replacement therapy as ordered  Outcome: Progressing

## 2019-03-11 NOTE — ASSESSMENT & PLAN NOTE
· Hypertensive urgency on admission, improving  · Discussed with Cardiology  Will increase lisinopril to 40 mg    Will decrease the Toprol-XL to 25 mg daily  · PRN hydralazine  · Monitor closely

## 2019-03-11 NOTE — UTILIZATION REVIEW
Continued Stay Review    Date: 3/11/2019  Vital Signs: /86   Pulse (!) 53   Temp 97 7 °F (36 5 °C)   Resp 18   Ht 5' 9" (1 753 m)   Wt 102 kg (224 lb 6 9 oz)   SpO2 95%   BMI 33 14 kg/m²   Assessment/Plan: stress test today-- high bp vs angina  Aggressive bp control and stress test  Medications:   Scheduled Meds:   Current Facility-Administered Medications:  acetaminophen 650 mg Oral Q4H PRN Berton Lowman, CRNP   aluminum-magnesium hydroxide-simethicone 30 mL Oral Q6H PRN Berton Lowman, CRNP   aspirin 81 mg Oral Daily Berton Lowman, CRNP   atorvastatin 40 mg Oral QPM Berton Lowman, CRNP   docusate sodium 100 mg Oral BID Berton Lowman, CRNP   enoxaparin 40 mg Subcutaneous Q24H [facility] Sabrina Hicks MD   fish oil 1,000 mg Oral Daily Berton Lowman, REYESNP   hydrALAZINE 5 mg Intravenous Q6H PRN Berton Lowman, CRNP   insulin lispro 1-6 Units Subcutaneous TID AC Joanna Kilpatrick, MILADY   insulin lispro 1-6 Units Subcutaneous HS Berton Lowman, MILADY   lisinopril 40 mg Oral Daily Reg Galeano DO   metoprolol succinate 50 mg Oral Daily Berton Lowman, CRNP   montelukast 10 mg Oral HS MILADY Crews   ondansetron 4 mg Intravenous Q6H PRN Berton Lowman, MILADY     Continuous Infusions:    PRN Meds:   acetaminophen    aluminum-magnesium hydroxide-simethicone    hydrALAZINE    ondansetron  Pertinent Labs/Diagnostic Results:   bs 143--215  Age/Sex: 60 y o  male   Discharge Plan: possible d/ c after stress test to home

## 2019-03-11 NOTE — PLAN OF CARE
Problem: Potential for Falls  Goal: Patient will remain free of falls  Description  INTERVENTIONS:  - Assess patient frequently for physical needs  -  Identify cognitive and physical deficits and behaviors that affect risk of falls    -  [place] fall precautions as indicated by assessment   - Educate patient/family on patient safety including physical limitations  - Instruct patient to call for assistance with activity based on assessment  - Modify environment to reduce risk of injury  - Consider OT/PT consult to assist with strengthening/mobility  Outcome: Progressing     Problem: Prexisting or High Potential for Compromised Skin Integrity  Goal: Skin integrity is maintained or improved  Description  INTERVENTIONS:  - Identify patients at risk for skin breakdown  - Assess and monitor skin integrity  - Assess and monitor nutrition and hydration status  - Monitor labs (i e  albumin)  - Assess for incontinence   - Turn and reposition patient  - Assist with mobility/ambulation  - Relieve pressure over bony prominences  - Avoid friction and shearing  - Provide appropriate hygiene as needed including keeping skin clean and dry  - Evaluate need for skin moisturizer/barrier cream  - Collaborate with interdisciplinary team (i e  Nutrition, Rehabilitation, etc )   - Patient/family teaching  Outcome: Progressing     Problem: PAIN - ADULT  Goal: Verbalizes/displays adequate comfort level or baseline comfort level  Description  Interventions:  - Encourage patient to monitor pain and request assistance  - Assess pain using appropriate pain scale  - Administer analgesics based on type and severity of pain and evaluate response  - Implement non-pharmacological measures as appropriate and evaluate response  - Consider cultural and social influences on pain and pain management  - Notify physician/advanced practitioner if interventions unsuccessful or patient reports new pain  Outcome: Progressing     Problem: INFECTION - ADULT  Goal: Absence or prevention of progression during hospitalization  Description  INTERVENTIONS:  - Assess and monitor for signs and symptoms of infection  - Monitor lab/diagnostic results  - Monitor all insertion sites, i e  indwelling lines, tubes, and drains  - Monitor endotracheal (as able) and nasal secretions for changes in amount and color  - [place] appropriate cooling/warming therapies per order  - Administer medications as ordered  - Instruct and encourage patient and family to use good hand hygiene technique  - Identify and instruct in appropriate isolation precautions for identified infection/condition  Outcome: Progressing     Problem: SAFETY ADULT  Goal: Patient will remain free of falls  Description  INTERVENTIONS:  - Assess patient frequently for physical needs  -  Identify cognitive and physical deficits and behaviors that affect risk of falls    -  [place] fall precautions as indicated by assessment   - Educate patient/family on patient safety including physical limitations  - Instruct patient to call for assistance with activity based on assessment  - Modify environment to reduce risk of injury  - Consider OT/PT consult to assist with strengthening/mobility  Outcome: Progressing  Goal: Maintain or return to baseline ADL function  Description  INTERVENTIONS:  -  Assess patient's ability to carry out ADLs; assess patient's baseline for ADL function and identify physical deficits which impact ability to perform ADLs (bathing, care of mouth/teeth, toileting, grooming, dressing, etc )  - Assess/evaluate cause of self-care deficits   - Assess range of motion  - Assess patient's mobility; develop plan if impaired  - Assess patient's need for assistive devices and provide as appropriate  - Encourage maximum independence but intervene and supervise when necessary  ¯ Involve family in performance of ADLs  ¯ Assess for home care needs following discharge   ¯ Request OT consult to assist with ADL evaluation and planning for discharge  ¯ Provide patient education as appropriate  Outcome: Progressing  Goal: Maintain or return mobility status to optimal level  Description  INTERVENTIONS:  - Assess patient's baseline mobility status (ambulation, transfers, stairs, etc )    - Identify cognitive and physical deficits and behaviors that affect mobility  - Identify mobility aids required to assist with transfers and/or ambulation (gait belt, sit-to-stand, lift, walker, cane, etc )  - [place] fall precautions as indicated by assessment  - Record patient progress and toleration of activity level on Mobility SBAR; progress patient to next Phase/Stage  - Instruct patient to call for assistance with activity based on assessment  - Request Rehabilitation consult to assist with strengthening/weightbearing, etc   Outcome: Progressing     Problem: DISCHARGE PLANNING  Goal: Discharge to home or other facility with appropriate resources  Description  INTERVENTIONS:  - Identify barriers to discharge w/patient and caregiver  - Arrange for needed discharge resources and transportation as appropriate  - Identify discharge learning needs (meds, wound care, etc )  - Arrange for interpretive services to assist at discharge as needed  - Refer to Case Management Department for coordinating discharge planning if the patient needs post-hospital services based on physician/advanced practitioner order or complex needs related to functional status, cognitive ability, or social support system  Outcome: Progressing     Problem: Knowledge Deficit  Goal: Patient/family/caregiver demonstrates understanding of disease process, treatment plan, medications, and discharge instructions  Description  Complete learning assessment and assess knowledge base    Interventions:  - Provide teaching at level of understanding  - Provide teaching via preferred learning methods  Outcome: Progressing     Problem: CARDIOVASCULAR - ADULT  Goal: Maintains optimal cardiac output and hemodynamic stability  Description  INTERVENTIONS:  - Monitor I/O, vital signs and rhythm  - Monitor for S/S and trends of decreased cardiac output i e  bleeding, hypotension  - Administer and titrate ordered vasoactive medications to optimize hemodynamic stability  - Assess quality of pulses, skin color and temperature  - Assess for signs of decreased coronary artery perfusion - ex   Angina  - Instruct patient to report change in severity of symptoms  Outcome: Progressing  Goal: Absence of cardiac dysrhythmias or at baseline rhythm  Description  INTERVENTIONS:  - Continuous cardiac monitoring, monitor vital signs, obtain 12 lead EKG if indicated  - Administer antiarrhythmic and heart rate control medications as ordered  - Monitor electrolytes and administer replacement therapy as ordered  Outcome: Progressing

## 2019-03-11 NOTE — ASSESSMENT & PLAN NOTE
· Patient reports strange chest sensations/palpitations, potentially in the setting of hypertensive urgency  · ACS rule out pathway  · Cardiology consult appreciated  Discussed with Dr Melanie Saul    Patient for stress echo tomorrow  · Echo ordered  · Troponin are negative, continue to trend  · EKG is negative  · KELLY score of 2  · Very atypical   Discharge after stress echo today

## 2019-03-12 ENCOUNTER — TRANSITIONAL CARE MANAGEMENT (OUTPATIENT)
Dept: INTERNAL MEDICINE CLINIC | Facility: CLINIC | Age: 59
End: 2019-03-12

## 2019-03-12 ENCOUNTER — OFFICE VISIT (OUTPATIENT)
Dept: INTERNAL MEDICINE CLINIC | Facility: CLINIC | Age: 59
End: 2019-03-12
Payer: COMMERCIAL

## 2019-03-12 ENCOUNTER — PATIENT OUTREACH (OUTPATIENT)
Dept: INTERNAL MEDICINE CLINIC | Facility: CLINIC | Age: 59
End: 2019-03-12

## 2019-03-12 VITALS
OXYGEN SATURATION: 96 % | TEMPERATURE: 98 F | HEIGHT: 69 IN | SYSTOLIC BLOOD PRESSURE: 160 MMHG | WEIGHT: 221.8 LBS | HEART RATE: 69 BPM | BODY MASS INDEX: 32.85 KG/M2 | DIASTOLIC BLOOD PRESSURE: 80 MMHG

## 2019-03-12 DIAGNOSIS — I11.9 HYPERTENSIVE HEART DISEASE WITHOUT HEART FAILURE: ICD-10-CM

## 2019-03-12 DIAGNOSIS — Z71.89 COMPLEX CARE COORDINATION: Primary | ICD-10-CM

## 2019-03-12 DIAGNOSIS — I10 ESSENTIAL HYPERTENSION: ICD-10-CM

## 2019-03-12 DIAGNOSIS — E11.69 TYPE 2 DIABETES MELLITUS WITH OTHER SPECIFIED COMPLICATION, WITHOUT LONG-TERM CURRENT USE OF INSULIN (HCC): Primary | ICD-10-CM

## 2019-03-12 DIAGNOSIS — R00.2 PALPITATIONS: ICD-10-CM

## 2019-03-12 PROCEDURE — 99496 TRANSJ CARE MGMT HIGH F2F 7D: CPT | Performed by: PHYSICIAN ASSISTANT

## 2019-03-12 RX ORDER — METOPROLOL SUCCINATE 25 MG/1
25 TABLET, EXTENDED RELEASE ORAL DAILY
Qty: 30 TABLET | Refills: 3 | Status: SHIPPED | OUTPATIENT
Start: 2019-03-12 | End: 2019-04-04 | Stop reason: SDUPTHER

## 2019-03-12 NOTE — PROGRESS NOTES
Assessment/Plan:  I reviewed his hospital records  He will now start taking metoprolol as directed 25 a day he will take lisinopril 40 mg per day  His blood pressure was 160/80 when I took it  His machine registered 152/106 at the same time  I recommend that he not use his machine  Getting a Holter he will follow up in 1 week to check his blood pressure and see how he tolerates the medicines  Eventually he will need to get his sugar better and be put on a statin  No problem-specific Assessment & Plan notes found for this encounter  Diagnoses and all orders for this visit:    Type 2 diabetes mellitus with other specified complication, without long-term current use of insulin ([facility])  -     Glucometer test strips    Essential hypertension  -     metoprolol succinate (TOPROL-XL) 25 mg 24 hr tablet; Take 1 tablet (25 mg total) by mouth daily for 30 days    Palpitations  -     Holter monitor - 24 hour; Future    Hypertensive heart disease without heart failure        Subjective:     Patient ID: Beni Abbasi is a 60 y o  male  He was hospitalized for 2 days last week because of palpitations and chest discomfort  He had a workup including a stress echo that was negative  Blood pressures were not optimal his beta-blocker was decreased his ACE-inhibitor was increased  He has recent diabetes A1c 8 6 he is overweight as well  Positive family history for heart disease and diabetes  He describes a vague sensation of his heart beating in the center of his chest but not fast fluttery or skipping  He denies shortness of breath or pain in the chest no dizziness or syncope is appetite has been good      The following portions of the patient's history were reviewed and updated as appropriate:  He  has a past medical history of Asthma, Diabetes mellitus ([facility] [address] ), and Hypertension    He   Patient Active Problem List    Diagnosis Date Noted    Chest pain 03/09/2019    Diabetes mellitus ([address] ) 03/05/2019    Asthma 03/05/2019    Hypertension 03/05/2019    Skin rash 03/05/2019    Prostate cancer screening 03/05/2019    Colon cancer screening 03/05/2019    Other specified glaucoma 03/05/2019    Hypertensive heart disease without heart failure 03/05/2019     He  has a past surgical history that includes Shoulder surgery (2015) and Cholecystectomy  His family history includes Cancer in his father; Diabetes in his mother; Hypertension in his brother, father, and mother  He  reports that he has never smoked  He has never used smokeless tobacco  He reports that he drinks alcohol  He reports that he does not use drugs  Current Outpatient Medications   Medication Sig Dispense Refill    aspirin (ECOTRIN LOW STRENGTH) 81 mg EC tablet Take 81 mg by mouth daily      cholecalciferol (VITAMIN D3) 1,000 units tablet Take 1 tablet (1,000 Units total) by mouth daily 90 tablet 1    lisinopril (ZESTRIL) 40 mg tablet Take 1 tablet (40 mg total) by mouth daily 30 tablet 0    metFORMIN (GLUCOPHAGE) 500 mg tablet Take 500 mg by mouth 2 (two) times a day with meals      montelukast (SINGULAIR) 10 mg tablet Take 1 tablet (10 mg total) by mouth daily at bedtime 90 tablet 1    omega-3-acid ethyl esters (LOVAZA) 1 g capsule Take 2 g by mouth 2 (two) times a day      sildenafil (REVATIO) 20 mg tablet Please take if only taking at home and it is a regular home medications  0    metoprolol succinate (TOPROL-XL) 25 mg 24 hr tablet Take 1 tablet (25 mg total) by mouth daily for 30 days 30 tablet 3     No current facility-administered medications for this visit  He has No Known Allergies       Review of Systems   Constitutional: Negative for activity change, appetite change, chills, diaphoresis, fatigue, fever and unexpected weight change     HENT: Negative for congestion, dental problem, drooling, ear discharge, ear pain, facial swelling, hearing loss, nosebleeds, postnasal drip, rhinorrhea, sinus pressure, sinus pain, sneezing, sore throat, tinnitus, trouble swallowing and voice change  Eyes: Negative for photophobia, pain, discharge, redness, itching and visual disturbance  Respiratory: Negative for apnea, cough, choking, chest tightness, shortness of breath, wheezing and stridor  Cardiovascular: Positive for palpitations  Negative for chest pain and leg swelling  Gastrointestinal: Negative for abdominal distention, abdominal pain, anal bleeding, blood in stool, constipation, diarrhea, nausea, rectal pain and vomiting  Endocrine: Negative for cold intolerance, heat intolerance, polydipsia, polyphagia and polyuria  Genitourinary: Negative for decreased urine volume, difficulty urinating, dysuria, enuresis, flank pain, frequency, genital sores, hematuria and urgency  Musculoskeletal: Negative for arthralgias, back pain, gait problem, joint swelling, myalgias, neck pain and neck stiffness  Skin: Negative for color change, pallor, rash and wound  Neurological: Negative for dizziness, tremors, seizures, syncope, facial asymmetry, speech difficulty, weakness, light-headedness, numbness and headaches  Hematological: Negative for adenopathy  Does not bruise/bleed easily  Psychiatric/Behavioral: Negative for agitation, behavioral problems, confusion, decreased concentration, dysphoric mood, hallucinations, self-injury, sleep disturbance and suicidal ideas  The patient is not nervous/anxious and is not hyperactive  Objective:     Physical Exam   Constitutional: He is oriented to person, place, and time  He appears well-developed  Obese   HENT:   Head: Normocephalic  Right Ear: External ear normal    Left Ear: External ear normal    Mouth/Throat: Oropharynx is clear and moist    Eyes: Pupils are equal, round, and reactive to light  Conjunctivae are normal    Neck: Neck supple  No thyromegaly present  Cardiovascular: Normal rate, regular rhythm, normal heart sounds and intact distal pulses   Exam reveals no gallop and no friction rub  No murmur heard  Pulmonary/Chest: Effort normal and breath sounds normal    Abdominal: Soft  Bowel sounds are normal    Musculoskeletal: Normal range of motion  Lymphadenopathy:     He has no cervical adenopathy  Neurological: He is alert and oriented to person, place, and time  He has normal reflexes  Skin: Skin is warm and dry  Vitals:    03/12/19 1450   BP: 140/98   BP Location: Left arm   Patient Position: Sitting   Pulse: 69   Temp: 98 °F (36 7 °C)   SpO2: 96%   Weight: 101 kg (221 lb 12 8 oz)   Height: 5' 9" (1 753 m)       Transitional Care Management Review:  Cornelio Gamboa is a 60 y o  male here for TCM follow up  During the TCM phone call patient stated:    TCM Call (since 2/9/2019)     Date and time call was made  3/12/2019 10:22 AM    Hospital care reviewed  Records reviewed    Patient was hospitialized at  [facility]    Date of Admission  03/09/19    Date of discharge  03/11/19    Diagnosis  palp, CP, HTN    Disposition  Home    Were the patients medications reviewed and updated  Yes    Current Symptoms  -- slight chest pressure      TCM Call (since 2/9/2019)     Post hospital issues  None    Scheduled for follow up?   Yes    Do you need help managing your prescriptions or medications  No    Is transportation to your appointment needed  No    I have advised the patient to call PCP with any new or worsening symptoms  Chandan Rosenberg LPN    Living Arrangements  Spouse or Significiant other    Are you recieving any outpatient services  No    Are you recieving home care services  No    Interperter language line needed  No    Counseling  Patient    Counseling topics  Importance of RX compliance              Sarwat Harper PA-C

## 2019-03-14 ENCOUNTER — HOSPITAL ENCOUNTER (OUTPATIENT)
Dept: NON INVASIVE DIAGNOSTICS | Facility: CLINIC | Age: 59
Discharge: HOME | End: 2019-03-14
Payer: COMMERCIAL

## 2019-03-14 DIAGNOSIS — R00.2 PALPITATIONS: ICD-10-CM

## 2019-03-14 DIAGNOSIS — E11.69 TYPE 2 DIABETES MELLITUS WITH OTHER SPECIFIED COMPLICATION, WITHOUT LONG-TERM CURRENT USE OF INSULIN (HCC): Primary | ICD-10-CM

## 2019-03-14 PROCEDURE — 93226 XTRNL ECG REC<48 HR SCAN A/R: CPT

## 2019-03-14 PROCEDURE — 93225 XTRNL ECG REC<48 HRS REC: CPT

## 2019-03-14 NOTE — TELEPHONE ENCOUNTER
Pt needs refill to be sent to [facility]    One touch ultra  Lancets    Any probs - call pt

## 2019-03-15 ENCOUNTER — TELEPHONE (OUTPATIENT)
Dept: INTERNAL MEDICINE CLINIC | Facility: CLINIC | Age: 59
End: 2019-03-15

## 2019-03-15 NOTE — TELEPHONE ENCOUNTER
Patient needs a refills on the one touch ultra lancets Q100 with 3 refills and the one touch ultra blue in vitro test strips sent to his [facility]    These items are not on his med list

## 2019-03-16 DIAGNOSIS — Z79.4 TYPE 2 DIABETES MELLITUS WITH HYPERGLYCEMIA, WITH LONG-TERM CURRENT USE OF INSULIN (HCC): Primary | ICD-10-CM

## 2019-03-16 DIAGNOSIS — E11.65 TYPE 2 DIABETES MELLITUS WITH HYPERGLYCEMIA, WITH LONG-TERM CURRENT USE OF INSULIN (HCC): Primary | ICD-10-CM

## 2019-03-18 PROCEDURE — 93227 XTRNL ECG REC<48 HR R&I: CPT | Performed by: INTERNAL MEDICINE

## 2019-03-18 RX ORDER — LANCETS
EACH MISCELLANEOUS
Qty: 100 EACH | Refills: 3 | Status: SHIPPED | OUTPATIENT
Start: 2019-03-18 | End: 2021-12-06 | Stop reason: SDUPTHER

## 2019-03-19 ENCOUNTER — OFFICE VISIT (OUTPATIENT)
Dept: INTERNAL MEDICINE CLINIC | Facility: CLINIC | Age: 59
End: 2019-03-19
Payer: COMMERCIAL

## 2019-03-19 VITALS
WEIGHT: 221.4 LBS | SYSTOLIC BLOOD PRESSURE: 124 MMHG | DIASTOLIC BLOOD PRESSURE: 70 MMHG | HEART RATE: 64 BPM | BODY MASS INDEX: 32.79 KG/M2 | HEIGHT: 69 IN | OXYGEN SATURATION: 96 %

## 2019-03-19 DIAGNOSIS — E11.69 TYPE 2 DIABETES MELLITUS WITH OTHER SPECIFIED COMPLICATION, WITHOUT LONG-TERM CURRENT USE OF INSULIN (HCC): Primary | ICD-10-CM

## 2019-03-19 DIAGNOSIS — Z79.4 TYPE 2 DIABETES MELLITUS WITH HYPERGLYCEMIA, WITH LONG-TERM CURRENT USE OF INSULIN (HCC): ICD-10-CM

## 2019-03-19 DIAGNOSIS — I10 ESSENTIAL HYPERTENSION: ICD-10-CM

## 2019-03-19 DIAGNOSIS — E11.65 TYPE 2 DIABETES MELLITUS WITH HYPERGLYCEMIA, WITH LONG-TERM CURRENT USE OF INSULIN (HCC): ICD-10-CM

## 2019-03-19 PROCEDURE — 3074F SYST BP LT 130 MM HG: CPT | Performed by: PHYSICIAN ASSISTANT

## 2019-03-19 PROCEDURE — 99214 OFFICE O/P EST MOD 30 MIN: CPT | Performed by: PHYSICIAN ASSISTANT

## 2019-03-19 PROCEDURE — 3078F DIAST BP <80 MM HG: CPT | Performed by: PHYSICIAN ASSISTANT

## 2019-03-19 PROCEDURE — 3008F BODY MASS INDEX DOCD: CPT | Performed by: PHYSICIAN ASSISTANT

## 2019-03-19 NOTE — PROGRESS NOTES
Assessment/Plan:  He is feeling well physical exam is unremarkable his blood pressure is now well controlled  He would rather not increase his metformin at this time  Previously had some loose stools with metformin he will wait till next week when he sees Dr Nathaniel Null as far as further management of diabetes he is trying to lose weight and watch his diet  The ventral he will need a statin  Diagnoses and all orders for this visit:    Type 2 diabetes mellitus with other specified complication, without long-term current use of insulin ([facility] [address] )    Type 2 diabetes mellitus with hyperglycemia, with long-term current use of insulin ([facility])  -     glucose blood test strip; 1 each by Other route 4 (four) times daily (after meals and at bedtime) Test once daily or as directed  -     Hemoglobin A1C; Future  -     Hemoglobin A1C    Essential hypertension        No problem-specific Assessment & Plan notes found for this encounter  Subjective:      Patient ID: Jerry Hinkle is a 60 y o  male  Back for follow-up seen last week for hospital follow-up he had shortness of breath and palpitations  Blood pressure blood sugar were not optimal   His palpitations shortness of breath have resolved his Holter monitor was negative I reviewed with him  No orthopnea PND dizziness nausea  He is trying to lose weight with diet and exercise long history of diabetes on oral agents not optimally controlled      The following portions of the patient's history were reviewed and updated as appropriate:   He has a past medical history of Asthma, Diabetes mellitus ([address] ), and Hypertension  ,  does not have any pertinent problems on file  ,   has a past surgical history that includes Shoulder surgery (2015) and Cholecystectomy  ,  family history includes Cancer in his father; Diabetes in his mother; Hypertension in his brother, father, and mother  ,   reports that he has never smoked   He has never used smokeless tobacco  He reports that he drinks alcohol  He reports that he does not use drugs  ,  has No Known Allergies     Current Outpatient Medications   Medication Sig Dispense Refill    aspirin (ECOTRIN LOW STRENGTH) 81 mg EC tablet Take 81 mg by mouth daily      cholecalciferol (VITAMIN D3) 1,000 units tablet Take 1 tablet (1,000 Units total) by mouth daily 90 tablet 1    glucose blood test strip 1 each by Other route 4 (four) times daily (after meals and at bedtime) Test once daily or as directed 100 each 2    Lancets (ONETOUCH ULTRASOFT) lancets Test once daily or as directed 100 each 3    lisinopril (ZESTRIL) 40 mg tablet Take 1 tablet (40 mg total) by mouth daily 30 tablet 0    metFORMIN (GLUCOPHAGE) 500 mg tablet Take 500 mg by mouth 2 (two) times a day with meals      metoprolol succinate (TOPROL-XL) 25 mg 24 hr tablet Take 1 tablet (25 mg total) by mouth daily for 30 days 30 tablet 3    montelukast (SINGULAIR) 10 mg tablet Take 1 tablet (10 mg total) by mouth daily at bedtime 90 tablet 1    omega-3-acid ethyl esters (LOVAZA) 1 g capsule Take 2 g by mouth 2 (two) times a day      sildenafil (REVATIO) 20 mg tablet Please take if only taking at home and it is a regular home medications  0     No current facility-administered medications for this visit  Review of Systems   Constitutional: Negative for activity change, appetite change, chills, diaphoresis, fatigue, fever and unexpected weight change  HENT: Negative for congestion, dental problem, drooling, ear discharge, ear pain, facial swelling, hearing loss, nosebleeds, postnasal drip, rhinorrhea, sinus pressure, sinus pain, sneezing, sore throat, tinnitus, trouble swallowing and voice change  Eyes: Negative for photophobia, pain, discharge, redness, itching and visual disturbance  Respiratory: Negative for apnea, cough, choking, chest tightness, shortness of breath, wheezing and stridor  Cardiovascular: Negative for chest pain, palpitations and leg swelling  Gastrointestinal: Negative for abdominal distention, abdominal pain, anal bleeding, blood in stool, constipation, diarrhea, nausea, rectal pain and vomiting  Endocrine: Negative for cold intolerance, heat intolerance, polydipsia, polyphagia and polyuria  Genitourinary: Negative for decreased urine volume, difficulty urinating, dysuria, enuresis, flank pain, frequency, genital sores, hematuria and urgency  Musculoskeletal: Negative for arthralgias, back pain, gait problem, joint swelling, myalgias, neck pain and neck stiffness  Skin: Negative for color change, pallor, rash and wound  Neurological: Negative for dizziness, tremors, seizures, syncope, facial asymmetry, speech difficulty, weakness, light-headedness, numbness and headaches  Hematological: Negative for adenopathy  Does not bruise/bleed easily  Psychiatric/Behavioral: Negative for agitation, behavioral problems, confusion, decreased concentration, dysphoric mood, hallucinations, self-injury, sleep disturbance and suicidal ideas  The patient is not nervous/anxious and is not hyperactive  Objective:  Vitals:    03/19/19 1049   BP: 124/70   BP Location: Left arm   Patient Position: Sitting   Cuff Size: Standard   Pulse: 64   SpO2: 96%   Weight: 100 kg (221 lb 6 4 oz)   Height: 5' 9" (1 753 m)     Body mass index is 32 7 kg/m²  Physical Exam   Constitutional: He is oriented to person, place, and time  He appears well-developed  Obese   HENT:   Head: Normocephalic  Right Ear: External ear normal    Left Ear: External ear normal    Mouth/Throat: Oropharynx is clear and moist    Eyes: Pupils are equal, round, and reactive to light  Conjunctivae are normal    Neck: Neck supple  No JVD present  No thyromegaly present  Cardiovascular: Normal rate, regular rhythm, normal heart sounds and intact distal pulses  Exam reveals no gallop and no friction rub  No murmur heard    Pulmonary/Chest: Effort normal and breath sounds normal  No stridor  No respiratory distress  He has no wheezes  He has no rales  Abdominal: Soft  Bowel sounds are normal    Musculoskeletal: Normal range of motion  He exhibits no edema  Neurological: He is alert and oriented to person, place, and time  He has normal reflexes  Skin: Skin is warm and dry

## 2019-03-20 ENCOUNTER — PATIENT OUTREACH (OUTPATIENT)
Dept: INTERNAL MEDICINE CLINIC | Facility: CLINIC | Age: 59
End: 2019-03-20

## 2019-03-26 ENCOUNTER — OFFICE VISIT (OUTPATIENT)
Dept: INTERNAL MEDICINE CLINIC | Facility: CLINIC | Age: 59
End: 2019-03-26
Payer: COMMERCIAL

## 2019-03-26 VITALS
OXYGEN SATURATION: 94 % | BODY MASS INDEX: 32.61 KG/M2 | WEIGHT: 220.8 LBS | DIASTOLIC BLOOD PRESSURE: 80 MMHG | HEART RATE: 87 BPM | SYSTOLIC BLOOD PRESSURE: 110 MMHG

## 2019-03-26 DIAGNOSIS — I11.9 HYPERTENSIVE HEART DISEASE WITHOUT HEART FAILURE: ICD-10-CM

## 2019-03-26 DIAGNOSIS — E78.2 MIXED HYPERLIPIDEMIA: ICD-10-CM

## 2019-03-26 DIAGNOSIS — E11.69 TYPE 2 DIABETES MELLITUS WITH OTHER SPECIFIED COMPLICATION, WITHOUT LONG-TERM CURRENT USE OF INSULIN (HCC): Primary | ICD-10-CM

## 2019-03-26 DIAGNOSIS — I10 ESSENTIAL HYPERTENSION: ICD-10-CM

## 2019-03-26 PROBLEM — R07.9 CHEST PAIN: Status: RESOLVED | Noted: 2019-03-09 | Resolved: 2019-03-26

## 2019-03-26 PROCEDURE — 99495 TRANSJ CARE MGMT MOD F2F 14D: CPT | Performed by: INTERNAL MEDICINE

## 2019-03-26 RX ORDER — ATORVASTATIN CALCIUM 20 MG/1
20 TABLET, FILM COATED ORAL DAILY
Qty: 90 TABLET | Refills: 3 | Status: SHIPPED | OUTPATIENT
Start: 2019-03-26 | End: 2020-03-06 | Stop reason: SDUPTHER

## 2019-03-26 RX ORDER — METFORMIN HYDROCHLORIDE 500 MG/1
TABLET, EXTENDED RELEASE ORAL
Qty: 360 TABLET | Refills: 3 | Status: SHIPPED | OUTPATIENT
Start: 2019-03-26 | End: 2020-04-09 | Stop reason: SDUPTHER

## 2019-03-26 NOTE — PROGRESS NOTES
Assessment/Plan:     No problem-specific Assessment & Plan notes found for this encounter  Diagnoses and all orders for this visit:    Type 2 diabetes mellitus with other specified complication, without long-term current use of insulin (HCC)  -     metFORMIN (GLUCOPHAGE-XR) 500 mg 24 hr tablet; 4 TABLETS DAILY  -     atorvastatin (LIPITOR) 20 mg tablet; Take 1 tablet (20 mg total) by mouth daily  -     Basic metabolic panel; Future  -     Hemoglobin A1C; Future  -     Basic metabolic panel  -     Hemoglobin A1C    Essential hypertension    Hypertensive heart disease without heart failure    Mixed hyperlipidemia  -     atorvastatin (LIPITOR) 20 mg tablet; Take 1 tablet (20 mg total) by mouth daily         Subjective:     Patient ID: Neris Canales is a 60 y o  male  Hypertension diabetes and dyslipidemia    1st seen by me on March 5th  Noted to have uncontrolled hypertension and diabetes  Recommended he take metoprolol which she did do  Went to the ER several days later with chest pain  Was admitted with chest pain and accelerated hypertension  Troponins were negative and he went for stress echo which was normal   Discharged on multiple antihypertensives and blood pressure is not controlled  Hemoglobin A1cs 8 6  Presents for follow-up today  Review of Systems   Constitutional: Negative for chills and fever  HENT: Negative for sore throat and trouble swallowing  Eyes: Negative for pain  Respiratory: Negative for cough and shortness of breath  Cardiovascular: Negative for chest pain and palpitations  Gastrointestinal: Negative for abdominal pain, blood in stool, diarrhea, nausea and vomiting  Endocrine: Negative for cold intolerance and heat intolerance  Genitourinary: Negative for dysuria, frequency and testicular pain  Musculoskeletal: Negative for joint swelling  Skin: Positive for rash  Allergic/Immunologic: Negative for immunocompromised state     Neurological: Negative for dizziness, syncope and headaches  Hematological: Negative for adenopathy  Does not bruise/bleed easily  Psychiatric/Behavioral: Negative for dysphoric mood  The patient is not nervous/anxious  Objective:     Physical Exam   Constitutional: He is oriented to person, place, and time  He appears well-developed and well-nourished  An obese male patient who appears to be stated age   HENT:   Head: Normocephalic and atraumatic  Eyes: Pupils are equal, round, and reactive to light  Conjunctivae are normal    Neck: Normal range of motion  No thyromegaly present  Cardiovascular: Normal rate, regular rhythm and normal heart sounds  No murmur heard  Pulmonary/Chest: Effort normal  No respiratory distress  He has no wheezes  He has no rales  Abdominal: Soft  There is no tenderness  Genitourinary: Penis normal  Right testis shows no mass and no tenderness  Left testis shows no mass and no tenderness  Musculoskeletal: Normal range of motion  He exhibits no deformity  Lymphadenopathy:     He has no cervical adenopathy  Neurological: He is alert and oriented to person, place, and time  Skin: Skin is warm and dry  Rash noted  Scattered maculopapular lesions on the arms and legs  Irregular port wine discoloration of the left foot and medial left ankle region   Psychiatric: He has a normal mood and affect  His behavior is normal  Judgment and thought content normal          Vitals:    03/26/19 1138   BP: 110/80   BP Location: Left arm   Patient Position: Sitting   Pulse: 87   SpO2: 94%   Weight: 100 kg (220 lb 12 8 oz)       Transitional Care Management Review:  Neris Canales is a 60 y o  male here for TCM follow up       During the TCM phone call patient stated:    TCM Call (since 2/23/2019)     Date and time call was made  3/12/2019 10:22 AM    Hospital care reviewed  Records reviewed    Patient was hospitial[facility] at  [facility]    Date of Admission  03/09/19    Date of discharge  03/11/19 Diagnosis  palp, CP, HTN    Disposition  Home    Were the patients medications reviewed and updated  Yes    Current Symptoms  -- slight chest pressure      TCM Call (since 2/23/2019)     Post hospital issues  None    Scheduled for follow up?   Yes    Do you need help managing your prescriptions or medications  No    Is transportation to your appointment needed  No    I have advised the patient to call PCP with any new or worsening symptoms  Alexis Smith LPN    Living Arrangements  Spouse or Significiant other    Are you recieving any outpatient services  No    Are you recieving home care services  No    Interperter language line needed  No    Counseling  Patient    Counseling topics  Importance of RX compliance          Kamryn Thompson MD

## 2019-03-26 NOTE — PROGRESS NOTES
Diabetic Foot Exam    Patient's shoes and socks removed  Right Foot/Ankle   Right Foot Inspection  Skin Exam: skin intact                            Sensory       Monofilament testing: intact  Vascular    The right DP pulse is 1+  The right PT pulse is 1+  Left Foot/Ankle  Left Foot Inspection  Skin Exam: skin intact                                         Sensory       Monofilament: intact  Vascular    The left DP pulse is 1+  The left PT pulse is 1+  Assign Risk Category:  No deformity present; No loss of protective sensation;  No weak pulses       Risk: 0

## 2019-03-26 NOTE — PATIENT INSTRUCTIONS
A diabetic patient with a chest pains care  Blood pressure is not controlled  Hemoglobin A1c of 8 6% taking metformin 500 mg p  O  B i d   The patient reports some loose bowel movement with this so I think it would be wise to switch to metformin extended release 500 p o  B i d  And move on from there  He is at high risk given his diabetic history so based upon the concept of coronary artery equivalent should be taking atorvastatin 20 mg daily

## 2019-04-03 ENCOUNTER — PATIENT OUTREACH (OUTPATIENT)
Dept: INTERNAL MEDICINE CLINIC | Facility: CLINIC | Age: 59
End: 2019-04-03

## 2019-04-04 DIAGNOSIS — I10 ESSENTIAL HYPERTENSION: ICD-10-CM

## 2019-04-04 DIAGNOSIS — J45.20 MILD INTERMITTENT ASTHMA WITHOUT COMPLICATION: ICD-10-CM

## 2019-04-05 PROCEDURE — 4010F ACE/ARB THERAPY RXD/TAKEN: CPT | Performed by: PHYSICIAN ASSISTANT

## 2019-04-05 RX ORDER — METOPROLOL SUCCINATE 25 MG/1
25 TABLET, EXTENDED RELEASE ORAL DAILY
Qty: 90 TABLET | Refills: 1 | Status: SHIPPED | OUTPATIENT
Start: 2019-04-05 | End: 2019-09-30 | Stop reason: SDUPTHER

## 2019-04-05 RX ORDER — LISINOPRIL 40 MG/1
40 TABLET ORAL DAILY
Qty: 90 TABLET | Refills: 1 | Status: SHIPPED | OUTPATIENT
Start: 2019-04-05 | End: 2019-09-30 | Stop reason: SDUPTHER

## 2019-04-05 RX ORDER — MONTELUKAST SODIUM 10 MG/1
10 TABLET ORAL
Qty: 90 TABLET | Refills: 1 | Status: SHIPPED | OUTPATIENT
Start: 2019-04-05 | End: 2019-09-30 | Stop reason: SDUPTHER

## 2019-04-10 ENCOUNTER — PATIENT OUTREACH (OUTPATIENT)
Dept: INTERNAL MEDICINE CLINIC | Facility: CLINIC | Age: 59
End: 2019-04-10

## 2019-04-22 DIAGNOSIS — E11.69 TYPE 2 DIABETES MELLITUS WITH OTHER SPECIFIED COMPLICATION, WITHOUT LONG-TERM CURRENT USE OF INSULIN (HCC): Primary | ICD-10-CM

## 2019-04-22 RX ORDER — OMEGA-3-ACID ETHYL ESTERS 1 G/1
2 CAPSULE, LIQUID FILLED ORAL 2 TIMES DAILY
Qty: 360 CAPSULE | Refills: 1 | Status: SHIPPED | OUTPATIENT
Start: 2019-04-22 | End: 2020-04-09 | Stop reason: SDUPTHER

## 2019-05-17 ENCOUNTER — PATIENT OUTREACH (OUTPATIENT)
Dept: INTERNAL MEDICINE CLINIC | Facility: CLINIC | Age: 59
End: 2019-05-17

## 2019-06-04 ENCOUNTER — OFFICE VISIT (OUTPATIENT)
Dept: INTERNAL MEDICINE CLINIC | Facility: CLINIC | Age: 59
End: 2019-06-04
Payer: COMMERCIAL

## 2019-06-04 VITALS
DIASTOLIC BLOOD PRESSURE: 92 MMHG | BODY MASS INDEX: 31.99 KG/M2 | HEART RATE: 67 BPM | SYSTOLIC BLOOD PRESSURE: 160 MMHG | OXYGEN SATURATION: 95 % | HEIGHT: 69 IN | WEIGHT: 216 LBS

## 2019-06-04 DIAGNOSIS — I10 ESSENTIAL HYPERTENSION: ICD-10-CM

## 2019-06-04 DIAGNOSIS — E11.69 TYPE 2 DIABETES MELLITUS WITH OTHER SPECIFIED COMPLICATION, WITHOUT LONG-TERM CURRENT USE OF INSULIN (HCC): Primary | ICD-10-CM

## 2019-06-04 DIAGNOSIS — I11.9 HYPERTENSIVE HEART DISEASE WITHOUT HEART FAILURE: ICD-10-CM

## 2019-06-04 LAB — SL AMB POCT HEMOGLOBIN AIC: 6.4 (ref ?–6.5)

## 2019-06-04 PROCEDURE — 3008F BODY MASS INDEX DOCD: CPT | Performed by: INTERNAL MEDICINE

## 2019-06-04 PROCEDURE — 83036 HEMOGLOBIN GLYCOSYLATED A1C: CPT | Performed by: INTERNAL MEDICINE

## 2019-06-04 PROCEDURE — 1036F TOBACCO NON-USER: CPT | Performed by: INTERNAL MEDICINE

## 2019-06-04 PROCEDURE — 3044F HG A1C LEVEL LT 7.0%: CPT | Performed by: INTERNAL MEDICINE

## 2019-06-04 PROCEDURE — 99213 OFFICE O/P EST LOW 20 MIN: CPT | Performed by: INTERNAL MEDICINE

## 2019-06-04 RX ORDER — AMLODIPINE BESYLATE 2.5 MG/1
2.5 TABLET ORAL DAILY
Qty: 7 TABLET | Refills: 0 | Status: SHIPPED | OUTPATIENT
Start: 2019-06-04 | End: 2019-08-06

## 2019-06-04 RX ORDER — AMLODIPINE BESYLATE 5 MG/1
5 TABLET ORAL DAILY
Qty: 90 TABLET | Refills: 3 | Status: SHIPPED | OUTPATIENT
Start: 2019-06-04 | End: 2019-08-06 | Stop reason: SDUPTHER

## 2019-06-06 ENCOUNTER — PATIENT OUTREACH (OUTPATIENT)
Dept: INTERNAL MEDICINE CLINIC | Facility: CLINIC | Age: 59
End: 2019-06-06

## 2019-07-10 ENCOUNTER — PATIENT OUTREACH (OUTPATIENT)
Dept: INTERNAL MEDICINE CLINIC | Facility: CLINIC | Age: 59
End: 2019-07-10

## 2019-07-10 NOTE — PROGRESS NOTES
Jackson Peña stated that he has been feeling great  Stated blood pressure has improved  Blood sugars have been stable  Tolerating ordered medications  Continues to stay on track with trying to stay active  Currently on vacation so he is doing the best that he could be doing with his meal choices but has been staying on track  Reviewed over red flag symptoms for DM  Currently has no questions or concerns  Continues to have care management contact information if need be  Will follow up

## 2019-07-11 DIAGNOSIS — I10 ESSENTIAL HYPERTENSION: ICD-10-CM

## 2019-07-11 RX ORDER — SILDENAFIL CITRATE 20 MG/1
TABLET ORAL
Qty: 5 TABLET | Refills: 0 | Status: SHIPPED | OUTPATIENT
Start: 2019-07-11 | End: 2019-07-15 | Stop reason: SDUPTHER

## 2019-07-15 DIAGNOSIS — I10 ESSENTIAL HYPERTENSION: ICD-10-CM

## 2019-07-15 RX ORDER — SILDENAFIL CITRATE 20 MG/1
TABLET ORAL
Qty: 30 TABLET | Refills: 0 | Status: SHIPPED | OUTPATIENT
Start: 2019-07-15 | End: 2020-03-06 | Stop reason: SDUPTHER

## 2019-08-06 ENCOUNTER — OFFICE VISIT (OUTPATIENT)
Dept: INTERNAL MEDICINE CLINIC | Facility: CLINIC | Age: 59
End: 2019-08-06
Payer: COMMERCIAL

## 2019-08-06 VITALS
BODY MASS INDEX: 31.4 KG/M2 | WEIGHT: 212 LBS | SYSTOLIC BLOOD PRESSURE: 104 MMHG | DIASTOLIC BLOOD PRESSURE: 70 MMHG | HEIGHT: 69 IN | OXYGEN SATURATION: 94 % | HEART RATE: 75 BPM

## 2019-08-06 DIAGNOSIS — Z12.11 COLON CANCER SCREENING: ICD-10-CM

## 2019-08-06 DIAGNOSIS — I10 ESSENTIAL HYPERTENSION: ICD-10-CM

## 2019-08-06 DIAGNOSIS — E83.52 HYPERCALCEMIA: Primary | ICD-10-CM

## 2019-08-06 DIAGNOSIS — I11.9 HYPERTENSIVE HEART DISEASE WITHOUT HEART FAILURE: Primary | ICD-10-CM

## 2019-08-06 PROBLEM — M25.521 RIGHT ELBOW PAIN: Status: ACTIVE | Noted: 2019-08-06

## 2019-08-06 LAB
BUN SERPL-MCNC: 20 MG/DL (ref 6–24)
BUN/CREAT SERPL: 17 (ref 9–20)
CALCIUM SERPL-MCNC: 10.4 MG/DL (ref 8.7–10.2)
CHLORIDE SERPL-SCNC: 101 MMOL/L (ref 96–106)
CO2 SERPL-SCNC: 23 MMOL/L (ref 20–29)
CREAT SERPL-MCNC: 1.16 MG/DL (ref 0.76–1.27)
EST. AVERAGE GLUCOSE BLD GHB EST-MCNC: 148 MG/DL
GLUCOSE SERPL-MCNC: 109 MG/DL (ref 65–99)
HBA1C MFR BLD: 6.8 % (ref 4.8–5.6)
POTASSIUM SERPL-SCNC: 5.3 MMOL/L (ref 3.5–5.2)
SL AMB EGFR AFRICAN AMERICAN: 79 ML/MIN/1.73
SL AMB EGFR NON AFRICAN AMERICAN: 69 ML/MIN/1.73
SODIUM SERPL-SCNC: 139 MMOL/L (ref 134–144)

## 2019-08-06 PROCEDURE — 1036F TOBACCO NON-USER: CPT | Performed by: INTERNAL MEDICINE

## 2019-08-06 PROCEDURE — 3008F BODY MASS INDEX DOCD: CPT | Performed by: INTERNAL MEDICINE

## 2019-08-06 PROCEDURE — 3074F SYST BP LT 130 MM HG: CPT | Performed by: INTERNAL MEDICINE

## 2019-08-06 PROCEDURE — 3044F HG A1C LEVEL LT 7.0%: CPT | Performed by: INTERNAL MEDICINE

## 2019-08-06 PROCEDURE — 99213 OFFICE O/P EST LOW 20 MIN: CPT | Performed by: INTERNAL MEDICINE

## 2019-08-06 RX ORDER — AMLODIPINE BESYLATE 5 MG/1
5 TABLET ORAL DAILY
Qty: 90 TABLET | Refills: 3 | Status: SHIPPED | OUTPATIENT
Start: 2019-08-06 | End: 2020-08-05 | Stop reason: SDUPTHER

## 2019-08-06 NOTE — PROGRESS NOTES
Assessment/Plan:       Diagnoses and all orders for this visit:    Hypertensive heart disease without heart failure    Essential hypertension  -     amLODIPine (NORVASC) 5 mg tablet; Take 1 tablet (5 mg total) by mouth daily    Colon cancer screening  -     Ambulatory referral to Gastroenterology; Future          Patient Instructions   Blood pressure well controlled with addition of amlodipine 5 mg  Right elbow pain noted:  Probably an overuse  You are free to either ignore it, take Motrin, or seek orthopedic consultation  If you do take Motrin or a similar drug he to be careful to take it with food and if you and up taking it ongoing I would like to know about it  Colonoscopy should be done  Follow up with me about the time of your birthday        Subjective:      Patient ID: Duane Blake is a 60 y o  male  Follow-up visit for a patient with metabolic syndrome  Blood pressure was not well controlled so amlodipine was added and titrated to 5 mg  He comes in today for follow-up  He has had some minimal orthostasis symptoms but does rapid onset of treatment and have now resolved  Blood pressure is now well controlled     Complaint of lateral right elbow pain but not located in the groove  Rather, located overlying the  pronator      The following portions of the patient's history were reviewed and updated as appropriate:   He has a past medical history of Asthma, Diabetes mellitus ([address] ), and Hypertension  ,  does not have any pertinent problems on file  ,   has a past surgical history that includes Shoulder surgery (2015) and Cholecystectomy  ,  family history includes Cancer in his father; Diabetes in his mother; Hypertension in his brother, father, and mother  ,   reports that he has never smoked  He has never used smokeless tobacco  He reports that he drinks alcohol  He reports that he does not use drugs  ,  has No Known Allergies     Current Outpatient Medications   Medication Sig Dispense Refill    amLODIPine (NORVASC) 5 mg tablet Take 1 tablet (5 mg total) by mouth daily 90 tablet 3    aspirin (ECOTRIN LOW STRENGTH) 81 mg EC tablet Take 81 mg by mouth daily      atorvastatin (LIPITOR) 20 mg tablet Take 1 tablet (20 mg total) by mouth daily 90 tablet 3    cholecalciferol (VITAMIN D3) 1,000 units tablet Take 1 tablet (1,000 Units total) by mouth daily 90 tablet 1    glucose blood test strip 1 each by Other route 4 (four) times daily (after meals and at bedtime) Test once daily or as directed 100 each 2    Lancets (ONETOUCH ULTRASOFT) lancets Test once daily or as directed 100 each 3    lisinopril (ZESTRIL) 40 mg tablet Take 1 tablet (40 mg total) by mouth daily 90 tablet 1    metFORMIN (GLUCOPHAGE-XR) 500 mg 24 hr tablet 4 TABLETS DAILY 360 tablet 3    metoprolol succinate (TOPROL-XL) 25 mg 24 hr tablet Take 1 tablet (25 mg total) by mouth daily for 30 days 90 tablet 1    montelukast (SINGULAIR) 10 mg tablet Take 1 tablet (10 mg total) by mouth daily at bedtime 90 tablet 1    omega-3-acid ethyl esters (LOVAZA) 1 g capsule Take 2 capsules (2 g total) by mouth 2 (two) times a day 360 capsule 1    sildenafil (REVATIO) 20 mg tablet Take 2 tablets 1 hour before intercourse 30 tablet 0     No current facility-administered medications for this visit  Review of Systems   Respiratory: Negative for chest tightness and shortness of breath  Cardiovascular: Negative for chest pain and leg swelling  Musculoskeletal: Positive for arthralgias  Objective:  Vitals:    08/06/19 1536   BP: 104/70   Pulse: 75   SpO2: 94%      Physical Exam   Constitutional: He appears well-developed and well-nourished  Cardiovascular: Normal rate  Pulmonary/Chest: Effort normal and breath sounds normal    Musculoskeletal: Normal range of motion  He exhibits tenderness      Minimal tenderness to palpation of the proximal insertion of the right arm pronator

## 2019-08-06 NOTE — PATIENT INSTRUCTIONS
Blood pressure well controlled with addition of amlodipine 5 mg  Right elbow pain noted:  Probably an overuse  You are free to either ignore it, take Motrin, or seek orthopedic consultation    If you do take Motrin or a similar drug he to be careful to take it with food and if you and up taking it ongoing I would like to know about it  Colonoscopy should be done  Follow up with me about the time of your birthday

## 2019-08-08 ENCOUNTER — TELEPHONE (OUTPATIENT)
Dept: INTERNAL MEDICINE CLINIC | Facility: CLINIC | Age: 59
End: 2019-08-08

## 2019-08-08 NOTE — TELEPHONE ENCOUNTER
Patient called looking for lab results from 8/7- Replaced by [facility] told him that his calcium level was high from the 8/5 labs    Patient call back # [phone]

## 2019-08-08 NOTE — TELEPHONE ENCOUNTER
CALLED [facility] AND REPORTS ARE NOT FINAL , WILL FAX WHEN FINAL, CALLED PT   AND WAS NOTIFIED AND WILL CALL TOMORROW TO SEE IF WE RECEIVED THEM

## 2019-08-09 LAB
25(OH)D3+25(OH)D2 SERPL-MCNC: 40.4 NG/ML (ref 30–100)
ALBUMIN SERPL ELPH-MCNC: 3.7 G/DL (ref 2.9–4.4)
ALBUMIN/GLOB SERPL: 1 {RATIO} (ref 0.7–1.7)
ALPHA1 GLOB SERPL ELPH-MCNC: 0.2 G/DL (ref 0–0.4)
ALPHA2 GLOB SERPL ELPH-MCNC: 1 G/DL (ref 0.4–1)
B-GLOBULIN SERPL ELPH-MCNC: 1.2 G/DL (ref 0.7–1.3)
CA-I SERPL ISE-MCNC: 5.3 MG/DL (ref 4.5–5.6)
GAMMA GLOB SERPL ELPH-MCNC: 1.2 G/DL (ref 0.4–1.8)
GLOBULIN SER CALC-MCNC: 3.6 G/DL (ref 2.2–3.9)
IGA SERPL-MCNC: 253 MG/DL (ref 90–386)
IGG SERPL-MCNC: 1087 MG/DL (ref 700–1600)
IGM SERPL-MCNC: 30 MG/DL (ref 20–172)
LABORATORY COMMENT REPORT: NORMAL
M PROTEIN SERPL ELPH-MCNC: NORMAL G/DL
PROT SERPL-MCNC: 7.3 G/DL (ref 6–8.5)
PTH-INTACT SERPL-MCNC: 30 PG/ML (ref 15–65)

## 2019-08-15 ENCOUNTER — TELEPHONE (OUTPATIENT)
Dept: INTERNAL MEDICINE CLINIC | Facility: CLINIC | Age: 59
End: 2019-08-15

## 2019-08-15 NOTE — TELEPHONE ENCOUNTER
----- Message from Bettey Ahumada, MD sent at 8/15/2019  6:36 PM EDT -----  Please get in touch with the lab    I have the sheet of the protein electrophoresis but no actual verbal report whether or not there is a monoclonal protein

## 2019-08-15 NOTE — TELEPHONE ENCOUNTER
----- Message from Jose Hurt MD sent at 8/15/2019  6:36 PM EDT -----  Please get in touch with the lab    I have the sheet of the protein electrophoresis but no actual verbal report whether or not there is a monoclonal protein

## 2019-08-16 NOTE — TELEPHONE ENCOUNTER
CALLED [facility] AND STATED NEW TEST NEEDS TO BE DONE  FOR MONOCLONAL PROTEIN AS CAN NOT ADD THIS ON AS TO LATE AND NEEDS CODE OF [id number]

## 2019-08-30 ENCOUNTER — PATIENT OUTREACH (OUTPATIENT)
Dept: INTERNAL MEDICINE CLINIC | Facility: CLINIC | Age: 59
End: 2019-08-30

## 2019-08-30 NOTE — PROGRESS NOTES
Lizettananya Wood feeling very well  Stated that his vitals have stabilized and feels that his medications are working very well  Blood sugar 110 this morning  Blood pressure has all been stable  Stated that he is in agreement to closure from outpatient care management  Goals met and patient stable  Currently has no questions or concerns  Continues to have contact information if need be

## 2019-09-30 DIAGNOSIS — I10 ESSENTIAL HYPERTENSION: ICD-10-CM

## 2019-09-30 DIAGNOSIS — J45.20 MILD INTERMITTENT ASTHMA WITHOUT COMPLICATION: ICD-10-CM

## 2019-09-30 RX ORDER — METOPROLOL SUCCINATE 25 MG/1
25 TABLET, EXTENDED RELEASE ORAL DAILY
Qty: 90 TABLET | Refills: 1 | Status: SHIPPED | OUTPATIENT
Start: 2019-09-30 | End: 2019-10-07 | Stop reason: SDUPTHER

## 2019-09-30 RX ORDER — LISINOPRIL 40 MG/1
40 TABLET ORAL DAILY
Qty: 90 TABLET | Refills: 1 | Status: SHIPPED | OUTPATIENT
Start: 2019-09-30 | End: 2020-04-05 | Stop reason: SDUPTHER

## 2019-09-30 RX ORDER — MONTELUKAST SODIUM 10 MG/1
10 TABLET ORAL
Qty: 90 TABLET | Refills: 1 | Status: SHIPPED | OUTPATIENT
Start: 2019-09-30 | End: 2020-04-05 | Stop reason: SDUPTHER

## 2019-09-30 NOTE — TELEPHONE ENCOUNTER
NEEDS  LISINOPRIL 40MG  MONTELUKAST 10MG  METOPROLOL SUCCINATE 25MG  [facility]    [phone]    PATIENT IS ON VACATION AND HIS BP HAS BEEN RUNNING HIGH    HIS READING /94   SHOULD HE INCREASE MEDICATION  PLEASE CALL HIM AT  [phone]

## 2019-10-07 ENCOUNTER — OFFICE VISIT (OUTPATIENT)
Dept: INTERNAL MEDICINE CLINIC | Facility: CLINIC | Age: 59
End: 2019-10-07
Payer: COMMERCIAL

## 2019-10-07 VITALS
WEIGHT: 219 LBS | HEIGHT: 69 IN | HEART RATE: 67 BPM | DIASTOLIC BLOOD PRESSURE: 70 MMHG | SYSTOLIC BLOOD PRESSURE: 140 MMHG | OXYGEN SATURATION: 95 % | BODY MASS INDEX: 32.44 KG/M2

## 2019-10-07 DIAGNOSIS — R07.89 CHEST PRESSURE: ICD-10-CM

## 2019-10-07 DIAGNOSIS — I11.9 HYPERTENSIVE HEART DISEASE WITHOUT HEART FAILURE: ICD-10-CM

## 2019-10-07 DIAGNOSIS — I10 ESSENTIAL HYPERTENSION: Primary | ICD-10-CM

## 2019-10-07 PROCEDURE — 99214 OFFICE O/P EST MOD 30 MIN: CPT | Performed by: INTERNAL MEDICINE

## 2019-10-07 PROCEDURE — 3008F BODY MASS INDEX DOCD: CPT | Performed by: INTERNAL MEDICINE

## 2019-10-07 RX ORDER — METOPROLOL SUCCINATE 25 MG/1
TABLET, EXTENDED RELEASE ORAL
Qty: 90 TABLET | Refills: 1
Start: 2019-10-07 | End: 2019-11-19 | Stop reason: SDUPTHER

## 2019-10-07 NOTE — PROGRESS NOTES
Assessment/Plan:       Diagnoses and all orders for this visit:    Essential hypertension  -     POCT ECG  -     metoprolol succinate (TOPROL-XL) 25 mg 24 hr tablet; Take 1-1/2 tablets daily at bedtime    Hypertensive heart disease without heart failure  -     Ambulatory referral to Cardiology; Future    Chest pressure  -     Ambulatory referral to Cardiology; Future                Subjective:      Patient ID: Sofia Swift is a 60 y o  male  78-year-old male treated for hypertension reports that his home monitor has been showing him high readings for the past month  This started when he was on vacation  At that time, he was drinking perhaps a bit more than usual but not lot  This is persistent since he has come home  He is on 3 drugs and is getting readings in the 140s to 150s which is high for him  He feels occasional  Left upper chest pressure  He has been experiencing this once a week for the past few weeks  There is no noticeable exacerbating factor  He ignores it  No associated nausea or diaphoresis  he did a stress echocardiogram about 6 months ago which was normal          The following portions of the patient's history were reviewed and updated as appropriate:   He has a past medical history of Asthma, Diabetes mellitus ([address] ), and Hypertension  ,  does not have any pertinent problems on file  ,   has a past surgical history that includes Shoulder surgery (2015) and Cholecystectomy  ,  family history includes Cancer in his father; Diabetes in his mother; Hypertension in his brother, father, and mother  ,   reports that he has never smoked  He has never used smokeless tobacco  He reports that he drinks alcohol  He reports that he does not use drugs  ,  has No Known Allergies     Current Outpatient Medications   Medication Sig Dispense Refill    amLODIPine (NORVASC) 5 mg tablet Take 1 tablet (5 mg total) by mouth daily 90 tablet 3    aspirin (ECOTRIN LOW STRENGTH) 81 mg EC tablet Take 81 mg by mouth daily      atorvastatin (LIPITOR) 20 mg tablet Take 1 tablet (20 mg total) by mouth daily 90 tablet 3    glucose blood test strip 1 each by Other route 4 (four) times daily (after meals and at bedtime) Test once daily or as directed 100 each 2    Lancets (ONETOUCH ULTRASOFT) lancets Test once daily or as directed 100 each 3    lisinopril (ZESTRIL) 40 mg tablet Take 1 tablet (40 mg total) by mouth daily 90 tablet 1    metFORMIN (GLUCOPHAGE-XR) 500 mg 24 hr tablet 4 TABLETS DAILY 360 tablet 3    metoprolol succinate (TOPROL-XL) 25 mg 24 hr tablet Take 1-1/2 tablets daily at bedtime 90 tablet 1    montelukast (SINGULAIR) 10 mg tablet Take 1 tablet (10 mg total) by mouth daily at bedtime 90 tablet 1    omega-3-acid ethyl esters (LOVAZA) 1 g capsule Take 2 capsules (2 g total) by mouth 2 (two) times a day 360 capsule 1    sildenafil (REVATIO) 20 mg tablet Take 2 tablets 1 hour before intercourse 30 tablet 0    cholecalciferol (VITAMIN D3) 1,000 units tablet Take 1 tablet (1,000 Units total) by mouth daily (Patient not taking: Reported on 10/7/2019) 90 tablet 1     No current facility-administered medications for this visit  Review of Systems   Constitutional: Negative for fatigue  Respiratory: Negative for chest tightness and shortness of breath  Cardiovascular: Positive for chest pain  Negative for palpitations and leg swelling  Musculoskeletal: Positive for arthralgias  Skin: Negative for rash  Neurological: Negative for dizziness and headaches  Objective:  Vitals:    10/07/19 1349   BP: 140/70   Pulse: 67   SpO2: 95%      Physical Exam   Constitutional: He is oriented to person, place, and time  He appears well-developed and well-nourished  No distress  An overweight male patient who appears to be his stated age   Eyes: No scleral icterus  Cardiovascular: Normal rate and regular rhythm  Pulmonary/Chest: Effort normal and breath sounds normal  He has no wheezes   He has no rales  He exhibits no tenderness  Neurological: He is oriented to person, place, and time  Patient Instructions     A patient whose blood pressure is modestly higher than desirable but who has this new onset of a chest pressure  EKG unchanged  Recommendation is to increase metoprolol from 1 daily to 1-1/2 daily, and follow up with a consult with Cardiology    DASH Eating Plan   WHAT YOU NEED TO KNOW:   The DASH (Dietary Approaches to Stop Hypertension) Eating Plan is designed to help prevent or lower high blood pressure  It can also help to lower LDL (bad) cholesterol and decrease your risk of heart disease  The plan is low in sodium, sugar, unhealthy fats, and total fat  It is high in potassium, calcium, magnesium, and fiber  These nutrients are added when you eat more fruits, vegetables, and whole grains  DISCHARGE INSTRUCTIONS:   Your sodium limit each day: Your dietitian will tell you how much sodium is safe for you to have each day  People with high blood pressure should have no more than 1,500 to 2,300 mg of sodium in a day  A teaspoon (tsp) of salt has 2,300 mg of sodium  This may seem like a difficult goal, but small changes to the foods you eat can make a big difference  Your healthcare provider or dietitian can help you create a meal plan that follows your sodium limit  How to limit sodium:   · Read food labels  Food labels can help you choose foods that are low in sodium  The amount of sodium is listed in milligrams (mg)  The % Daily Value (DV) column tells you how much of your daily needs are met by 1 serving of the food for each nutrient listed  Choose foods that have less than 5% of the DV of sodium  These foods are considered low in sodium  Foods that have 20% or more of the DV of sodium are considered high in sodium  Avoid foods that have more than 300 mg of sodium in each serving  Choose foods that say low-sodium, reduced-sodium, or no salt added on the food label  · Avoid salt  Do not salt food at the table, and add very little salt to foods during cooking  Use herbs and spices, such as onions, garlic, and salt-free seasonings to add flavor to foods  Try lemon or lime juice or vinegar to give foods a tart flavor  Use hot peppers or a small amount of hot pepper sauce to add a spicy flavor to foods  · Ask about salt substitutes  Ask your healthcare provider if you may use salt substitutes  Some salt substitutes have ingredients that can be harmful if you have certain health conditions  · Choose foods carefully at restaurants  Meals from restaurants, especially fast food restaurants, are often high in sodium  Some restaurants have nutrition information that tells you the amount of sodium in their foods  Ask to have your food prepared with less, or no salt  What you need to know about fats:   · Include healthy fats  Examples are unsaturated fats and omega-3 fatty acids  Unsaturated fats are found in soybean, canola, olive, or sunflower oil, and liquid and soft tub margarines  Omega-3 fatty acids are found in fatty fish, such as salmon, tuna, mackerel, and sardines  It is also found in flaxseed oil and ground flaxseed  · Avoid unhealthy fats  Do not eat unhealthy fats, such as saturated fats and trans fats  Saturated fats are found in foods that contain fat from animals  Examples are fatty meats, whole milk, butter, cream, and other dairy foods  It is also found in shortening, stick margarine, palm oil, and coconut oil  Trans fats are found in fried foods, crackers, chips, and baked goods made with margarine or shortening  Foods to include: With the DASH eating plan, you need to eat a certain number of servings from each food group  This will help you get enough of certain nutrients and limit others  The amount of servings you should eat depends on how many calories you need  Your dietitian can tell you how many calories you need   The number of servings listed next to the food groups below are for people who need about 2,000 calories each day    · Grains:  6 to 8 servings (3 of these servings should be whole-grain foods)    ¨ 1 slice of whole-grain bread     ¨ 1 ounce of dry cereal    ¨ ½ cup of cooked cereal, pasta, or brown rice    · Vegetables and fruits:  4 to 5 servings of fruits and 4 to 5 servings of vegetables    ¨ 1 medium fruit    ¨ ½ cup of frozen, canned (no added salt), or chopped fresh vegetables     ¨ ½ cup of fresh, frozen, dried, or canned fruit (canned in light syrup or fruit juice)    ¨ ½ cup of vegetable or fruit juice    · Dairy:  2 to 3 servings    ¨ 1 cup of nonfat (skim) or 1% milk    ¨ 1½ ounces of fat-free or low-fat cheese    ¨ 6 ounces of nonfat or low-fat yogurt    · Lean meat, poultry, and fish:  6 ounces or less    [employer] (chicken, turkey) with no skin    ¨ Fish (especially fatty fish, such as salmon, fresh tuna, or mackerel)    ¨ Lean beef and pork (loin, round, extra lean hamburger)    ¨ Egg whites and egg substitutes    · Nuts, seeds, and legumes:  4 to 5 servings each week    ¨ ½ cup of cooked beans and peas    ¨ 1½ ounces of unsalted nuts    ¨ 2 tablespoons of peanut butter or seeds    · Sweets and added sugars:  5 or less each week    ¨ 1 tablespoon of sugar, jelly, or jam    ¨ ½ cup of sorbet or gelatin    ¨ 1 cup of lemonade    · Fats:  2 to 3 servings each week    ¨ 1 teaspoon of soft margarine or vegetable oil    ¨ 1 tablespoon of mayonnaise    ¨ 2 tablespoons of salad dressing  Foods to avoid:   · Grains:      [employer], such as doughnuts, pastries, cookies, and biscuits (high in fat and sugar)    ¨ Mixes for cornbread and biscuits, packaged foods, such as bread stuffing, rice and pasta mixes, macaroni and cheese, and instant cereals (high in sodium)    · Fruits and vegetables:      ¨ Regular, canned vegetables (high in sodium)    ¨ Sauerkraut, pickled vegetables, and other foods prepared in brine (high in sodium)    ¨ Fried vegetables or vegetables in butter or high-fat sauces    ¨ Fruit in cream or butter sauce (high in fat)    · Dairy:      ¨ Whole milk, 2% milk, and cream (high in fat)    ¨ Regular cheese and processed cheese (high in fat and sodium)    · Meats and protein foods:      ¨ Smoked or cured meat, such as corned beef, gillette, ham, hot dogs, and sausage (high in fat and sodium)    ¨ Canned beans and canned meats or spreads, such as potted meats, sardines, anchovies, and imitation seafood (high in sodium)    ¨ Deli or lunch meats, such as bologna, ham, turkey, and roast beef (high in sodium)    ¨ High-fat meat (T-bone steak, regular hamburger, and ribs)    ¨ Whole eggs and egg yolks (high in fat)    · Other:      ¨ Seasonings made with salt, such as garlic salt, celery salt, onion salt, seasoned salt, meat tenderizers, and monosodium glutamate (MSG)    ¨ Miso soup and canned or dried soup mixes (high in sodium)    ¨ Regular soy sauce, barbecue sauce, teriyaki sauce, steak sauce, Worcestershire sauce, and most flavored vinegars (high in sodium)    ¨ Regular condiments, such as mustard, ketchup, and salad dressings (high in sodium)    ¨ Gravy and sauces, such as Erich or cheese sauces (high in sodium and fat)    ¨ Drinks high in sugar, such as soda or fruit drinks    [employer] foods, such as salted chips, popcorn, pretzels, pork rinds, salted crackers, and salted nuts    ¨ Frozen foods, such as dinners, entrees, vegetables with sauces, and breaded meats (high in sodium)  Other guidelines to follow:   · Maintain a healthy weight  Your risk for heart disease is higher if you are overweight  Your healthcare provider may suggest that you lose weight if you are overweight  You can lose weight by eating fewer calories and foods that have added sugars and fat  The DASH meal plan can help you do this  Decrease calories by eating smaller portions at each meal and fewer snacks   Ask your healthcare provider for more information about how to lose weight  · Exercise regularly  Regular exercise can help you reach or maintain a healthy weight  Regular exercise can also help decrease your blood pressure and improve your cholesterol levels  Get 30 minutes or more of moderate exercise each day of the week  To lose weight, get at least 60 minutes of exercise  Talk to your healthcare provider about the best exercise program for you  · Limit alcohol  Women should limit alcohol to 1 drink a day  Men should limit alcohol to 2 drinks a day  A drink of alcohol is 12 ounces of beer, 5 ounces of wine, or 1½ ounces of liquor  © 2017 [address]  Information is for End User's use only and may not be sold, redistributed or otherwise used for commercial purposes  All illustrations and images included in CareNotes® are the copyrighted property of [employer] M [employer]  or Augie Vidal  The above information is an  only  It is not intended as medical advice for individual conditions or treatments  Talk to your doctor, nurse or pharmacist before following any medical regimen to see if it is safe and effective for you

## 2019-10-07 NOTE — PATIENT INSTRUCTIONS
A patient whose blood pressure is modestly higher than desirable but who has this new onset of a chest pressure  EKG unchanged  Recommendation is to increase metoprolol from 1 daily to 1-1/2 daily, and follow up with a consult with Cardiology    DASH Eating Plan   WHAT YOU NEED TO KNOW:   The DASH (Dietary Approaches to Stop Hypertension) Eating Plan is designed to help prevent or lower high blood pressure  It can also help to lower LDL (bad) cholesterol and decrease your risk of heart disease  The plan is low in sodium, sugar, unhealthy fats, and total fat  It is high in potassium, calcium, magnesium, and fiber  These nutrients are added when you eat more fruits, vegetables, and whole grains  DISCHARGE INSTRUCTIONS:   Your sodium limit each day: Your dietitian will tell you how much sodium is safe for you to have each day  People with high blood pressure should have no more than 1,500 to 2,300 mg of sodium in a day  A teaspoon (tsp) of salt has 2,300 mg of sodium  This may seem like a difficult goal, but small changes to the foods you eat can make a big difference  Your healthcare provider or dietitian can help you create a meal plan that follows your sodium limit  How to limit sodium:   · Read food labels  Food labels can help you choose foods that are low in sodium  The amount of sodium is listed in milligrams (mg)  The % Daily Value (DV) column tells you how much of your daily needs are met by 1 serving of the food for each nutrient listed  Choose foods that have less than 5% of the DV of sodium  These foods are considered low in sodium  Foods that have 20% or more of the DV of sodium are considered high in sodium  Avoid foods that have more than 300 mg of sodium in each serving  Choose foods that say low-sodium, reduced-sodium, or no salt added on the food label  · Avoid salt  Do not salt food at the table, and add very little salt to foods during cooking   Use herbs and spices, such as onions, garlic, and salt-free seasonings to add flavor to foods  Try lemon or lime juice or vinegar to give foods a tart flavor  Use hot peppers or a small amount of hot pepper sauce to add a spicy flavor to foods  · Ask about salt substitutes  Ask your healthcare provider if you may use salt substitutes  Some salt substitutes have ingredients that can be harmful if you have certain health conditions  · Choose foods carefully at restaurants  Meals from restaurants, especially fast food restaurants, are often high in sodium  Some restaurants have nutrition information that tells you the amount of sodium in their foods  Ask to have your food prepared with less, or no salt  What you need to know about fats:   · Include healthy fats  Examples are unsaturated fats and omega-3 fatty acids  Unsaturated fats are found in soybean, canola, olive, or sunflower oil, and liquid and soft tub margarines  Omega-3 fatty acids are found in fatty fish, such as salmon, tuna, mackerel, and sardines  It is also found in flaxseed oil and ground flaxseed  · Avoid unhealthy fats  Do not eat unhealthy fats, such as saturated fats and trans fats  Saturated fats are found in foods that contain fat from animals  Examples are fatty meats, whole milk, butter, cream, and other dairy foods  It is also found in shortening, stick margarine, palm oil, and coconut oil  Trans fats are found in fried foods, crackers, chips, and baked goods made with margarine or shortening  Foods to include: With the DASH eating plan, you need to eat a certain number of servings from each food group  This will help you get enough of certain nutrients and limit others  The amount of servings you should eat depends on how many calories you need  Your dietitian can tell you how many calories you need  The number of servings listed next to the food groups below are for people who need about 2,000 calories each day    · Grains:  6 to 8 servings (3 of these servings should be whole-grain foods)    ¨ 1 slice of whole-grain bread     ¨ 1 ounce of dry cereal    ¨ ½ cup of cooked cereal, pasta, or brown rice    · Vegetables and fruits:  4 to 5 servings of fruits and 4 to 5 servings of vegetables    ¨ 1 medium fruit    ¨ ½ cup of frozen, canned (no added salt), or chopped fresh vegetables     ¨ ½ cup of fresh, frozen, dried, or canned fruit (canned in light syrup or fruit juice)    ¨ ½ cup of vegetable or fruit juice    · Dairy:  2 to 3 servings    ¨ 1 cup of nonfat (skim) or 1% milk    ¨ 1½ ounces of fat-free or low-fat cheese    ¨ 6 ounces of nonfat or low-fat yogurt    · Lean meat, poultry, and fish:  6 ounces or less    [employer] (chicken, turkey) with no skin    ¨ Fish (especially fatty fish, such as salmon, fresh tuna, or mackerel)    ¨ Lean beef and pork (loin, round, extra lean hamburger)    ¨ Egg whites and egg substitutes    · Nuts, seeds, and legumes:  4 to 5 servings each week    ¨ ½ cup of cooked beans and peas    ¨ 1½ ounces of unsalted nuts    ¨ 2 tablespoons of peanut butter or seeds    · Sweets and added sugars:  5 or less each week    ¨ 1 tablespoon of sugar, jelly, or jam    ¨ ½ cup of sorbet or gelatin    ¨ 1 cup of lemonade    · Fats:  2 to 3 servings each week    ¨ 1 teaspoon of soft margarine or vegetable oil    ¨ 1 tablespoon of mayonnaise    ¨ 2 tablespoons of salad dressing  Foods to avoid:   · Grains:      [employer], such as doughnuts, pastries, cookies, and biscuits (high in fat and sugar)    ¨ Mixes for cornbread and biscuits, packaged foods, such as bread stuffing, rice and pasta mixes, macaroni and cheese, and instant cereals (high in sodium)    · Fruits and vegetables:      ¨ Regular, canned vegetables (high in sodium)    ¨ Sauerkraut, pickled vegetables, and other foods prepared in brine (high in sodium)    ¨ Fried vegetables or vegetables in butter or high-fat sauces    ¨ Fruit in cream or butter sauce (high in fat)    · Dairy:      ¨ Whole milk, 2% milk, and cream (high in fat)    ¨ Regular cheese and processed cheese (high in fat and sodium)    · Meats and protein foods:      ¨ Smoked or cured meat, such as corned beef, gillette, ham, hot dogs, and sausage (high in fat and sodium)    ¨ Canned beans and canned meats or spreads, such as potted meats, sardines, anchovies, and imitation seafood (high in sodium)    ¨ Deli or lunch meats, such as bologna, ham, turkey, and roast beef (high in sodium)    ¨ High-fat meat (T-bone steak, regular hamburger, and ribs)    ¨ Whole eggs and egg yolks (high in fat)    · Other:      ¨ Seasonings made with salt, such as garlic salt, celery salt, onion salt, seasoned salt, meat tenderizers, and monosodium glutamate (MSG)    ¨ Miso soup and canned or dried soup mixes (high in sodium)    ¨ Regular soy sauce, barbecue sauce, teriyaki sauce, steak sauce, Worcestershire sauce, and most flavored vinegars (high in sodium)    ¨ Regular condiments, such as mustard, ketchup, and salad dressings (high in sodium)    ¨ Gravy and sauces, such as Erich or cheese sauces (high in sodium and fat)    ¨ Drinks high in sugar, such as soda or fruit drinks    [employer] foods, such as salted chips, popcorn, pretzels, pork rinds, salted crackers, and salted nuts    ¨ Frozen foods, such as dinners, entrees, vegetables with sauces, and breaded meats (high in sodium)  Other guidelines to follow:   · Maintain a healthy weight  Your risk for heart disease is higher if you are overweight  Your healthcare provider may suggest that you lose weight if you are overweight  You can lose weight by eating fewer calories and foods that have added sugars and fat  The DASH meal plan can help you do this  Decrease calories by eating smaller portions at each meal and fewer snacks  Ask your healthcare provider for more information about how to lose weight  · Exercise regularly  Regular exercise can help you reach or maintain a healthy weight   Regular exercise can also help decrease your blood pressure and improve your cholesterol levels  Get 30 minutes or more of moderate exercise each day of the week  To lose weight, get at least 60 minutes of exercise  Talk to your healthcare provider about the best exercise program for you  · Limit alcohol  Women should limit alcohol to 1 drink a day  Men should limit alcohol to 2 drinks a day  A drink of alcohol is 12 ounces of beer, 5 ounces of wine, or 1½ ounces of liquor  © 2017 [facility] Information is for End User's use only and may not be sold, redistributed or otherwise used for commercial purposes  All illustrations and images included in CareNotes® are the copyrighted property of A [employer] M [employer]  or Augie Vidal  The above information is an  only  It is not intended as medical advice for individual conditions or treatments  Talk to your doctor, nurse or pharmacist before following any medical regimen to see if it is safe and effective for you

## 2019-10-08 PROCEDURE — 93000 ELECTROCARDIOGRAM COMPLETE: CPT | Performed by: INTERNAL MEDICINE

## 2019-10-10 ENCOUNTER — OFFICE VISIT (OUTPATIENT)
Dept: GASTROENTEROLOGY | Facility: CLINIC | Age: 59
End: 2019-10-10
Payer: COMMERCIAL

## 2019-10-10 VITALS
SYSTOLIC BLOOD PRESSURE: 162 MMHG | DIASTOLIC BLOOD PRESSURE: 87 MMHG | HEART RATE: 63 BPM | WEIGHT: 219 LBS | BODY MASS INDEX: 32.44 KG/M2 | HEIGHT: 69 IN

## 2019-10-10 DIAGNOSIS — Z12.11 COLON CANCER SCREENING: ICD-10-CM

## 2019-10-10 PROCEDURE — 99243 OFF/OP CNSLTJ NEW/EST LOW 30: CPT | Performed by: INTERNAL MEDICINE

## 2019-10-10 NOTE — LETTER
October 10, 2019     Mazin Bustamante MD  [address] [address]    Patient: Sneha Fowler   YOB: 1960   Date of Visit: 10/10/2019       Dear Dr Savanah Raygoza: Thank you for referring Alexi Pimentel to me for evaluation  Below are my notes for this consultation  If you have questions, please do not hesitate to call me  I look forward to following your patient along with you  Sincerely,        Gene Pineda MD        CC: No Recipients  Gene Pineda MD  10/10/2019  1:57 PM  [facility]    Dear Ernie Shanks,     I had the pleasure of seeing your patient Sneha Fowler in the office today and I thank you for this kind referral        Chief Complaint:  Need for screening colonoscopy      HPI:  Sneha Fowler is a 60 y o  male who presents with history of average risk screening colonoscopy 11 years ago  At that time no polyps were found  Patient has no abdominal pain, change in bowel habits, change in stool caliber, melanotic stool, hematochezia, rectal bleeding, tenesmus, or weight loss  No family history of colon cancer  He says he has had loose or at least non solid stools for many many years  He also has had ribbon stool for many years  Review of Systems:   Constitutional: No fever or chills, feels well, no tiredness, no recent weight gain or weight loss  HENT: No complaints of earache, no hearing loss, no nosebleeds, no nasal discharge, no sore throat, no hoarseness  Eyes: No complaints of eye pain, no red eyes, no discharge from eyes, no itchy eyes  Cardiovascular: No complaints of slow heart rate, no fast heart rate, no chest pain, no palpitations, no leg claudication, no lower extremity edema  Respiratory: No complaints of shortness of breath, no wheezing, no cough, no SOB on exertion, no orthopnea     Gastrointestinal: As noted in HPI  Genitourinary: No complaints of dysuria, no incontinence, no hesitancy, no nocturia  Musculoskeletal: No complaints of arthralgia, no myalgias, no joint swelling or stiffness, no limb pain or swelling  Neurological: No complaints of headache, no confusion, no convulsions, no numbness or tingling, no dizziness or fainting, no limb weakness, no difficulty walking  Skin: No complaints of skin rash or skin lesions, no itching, no skin wound, no dry skin  Hematological/Lymphatic: No complaints of swollen glands, does not bleed easy  Allergic/Immunologic: No immunocompromised state  Endocrine:  No complaints of polyuria, no polydipsia  Psychiatric/Behavioral: is not suicidal, no sleep disturbances, no anxiety or depression, no change in personality, no emotional problems  Historical Information   Past Medical History:   Diagnosis Date    Asthma     Diabetes mellitus ([facility] [address] )     Hypertension      Past Surgical History:   Procedure Laterality Date    CHOLECYSTECTOMY      SHOULDER SURGERY  2015     Social History   Social History     Substance and Sexual Activity   Alcohol Use Yes    Frequency: Monthly or less    Drinks per session: 1 or 2    Binge frequency: Never    Comment: Liquor, wine     Social History     Substance and Sexual Activity   Drug Use Never     Social History     Tobacco Use   Smoking Status Never Smoker   Smokeless Tobacco Never Used     Family History   Problem Relation Age of Onset    Hypertension Mother     Diabetes Mother     Cancer Father     Hypertension Father     Hypertension Brother          Current Medications: has a current medication list which includes the following prescription(s): amlodipine, aspirin, atorvastatin, cholecalciferol, glucose blood, onetouch ultrasoft, lisinopril, metformin, metoprolol succinate, montelukast, omega-3-acid ethyl esters, and sildenafil         Vital Signs: /87   Pulse 63   Ht 5' 9" (1 753 m)   Wt 99 3 kg (219 lb)   BMI 32 34 kg/m²      Physical Exam:   Constitutional  General Appearance: No acute distress, well appearing and well nourished  Head  Normocephalic  Eyes  Conjunctivae and lids: No swelling, erythema, or discharge  Pupils and irises: Equal, round and reactive to light  Ears, Nose, Mouth, and Throat  External inspection of ears and nose: Normal  Nasal mucosa, septum and turbinates: Normal without edema or erythema/   Oropharynx: Normal with no erythema, edema, exudate or lesions  Neck  Normal range of motion  Neck supple  Cardiovascular  Auscultation of the heart: Normal rate and rhythm, normal S1 and S2 without murmurs  Examination of the extremities for edema and/or varicosities: Normal  Pulmonary/Chest  Respiratory effort: No increased work of breathing or signs of respiratory distress  Auscultation of lungs: Clear to auscultation, equal breath sounds bilaterally, no wheezes, rales, no rhonchi  Abdomen  Abdomen: Non-tender, no masses  Liver and spleen: No hepatomegaly or splenomegaly  Musculoskeletal  Gait and station: normal   Digits and Nails: normal without clubbing or cyanosis  Inspection/palpation of joints, bones, and muscles: Normal  Neurological  No nystagmus or asterixis  Skin  Skin and subcutaneous tissue: Normal without rashes or lesions  Lymphatic  Palpation of the lymph nodes in neck: No lymphadenopathy     Psychiatric  Orientation to person, place and time: Normal   Mood and affect: Normal          Labs:   Lab Results   Component Value Date    ALT 32 03/09/2019    AST 15 03/09/2019    BUN 20 08/05/2019    CALCIUM 8 8 03/10/2019     08/05/2019    CO2 23 08/05/2019    CREATININE 1 16 08/05/2019    HDL 32 (L) 03/10/2019    HCT 38 3 03/10/2019    HGB 12 9 03/10/2019    HGBA1C 6 8 (H) 08/05/2019    MG 1 7 03/10/2019    PHOS 3 5 03/10/2019     03/10/2019    K 5 3 (H) 08/05/2019    PSA 3 5 03/07/2019    TRIG 275 (H) 03/10/2019    WBC 7 59 03/10/2019         X-Rays & Procedures:   No orders to display ______________________________________________________________________      Assessment & Plan:      Diagnoses and all orders for this visit:    Colon cancer screening  -     Ambulatory referral to Gastroenterology            I have taken liberty of scheduling the patient for colonoscopy  I will be happy to inform you of his results and any further recommendations  I would like to thank you for allowing me to participate in his care          With warmest regards,    Sanjiv Pappas MD, [facility]

## 2019-10-10 NOTE — H&P (VIEW-ONLY)
[facility]    Dear Amy Linda,     I had the pleasure of seeing your patient Francie Bach in the office today and I thank you for this kind referral        Chief Complaint:  Need for screening colonoscopy      HPI:  Francie Bach is a 60 y o  male who presents with history of average risk screening colonoscopy 11 years ago  At that time no polyps were found  Patient has no abdominal pain, change in bowel habits, change in stool caliber, melanotic stool, hematochezia, rectal bleeding, tenesmus, or weight loss  No family history of colon cancer  He says he has had loose or at least non solid stools for many many years  He also has had ribbon stool for many years  Review of Systems:   Constitutional: No fever or chills, feels well, no tiredness, no recent weight gain or weight loss  HENT: No complaints of earache, no hearing loss, no nosebleeds, no nasal discharge, no sore throat, no hoarseness  Eyes: No complaints of eye pain, no red eyes, no discharge from eyes, no itchy eyes  Cardiovascular: No complaints of slow heart rate, no fast heart rate, no chest pain, no palpitations, no leg claudication, no lower extremity edema  Respiratory: No complaints of shortness of breath, no wheezing, no cough, no SOB on exertion, no orthopnea  Gastrointestinal: As noted in HPI  Genitourinary: No complaints of dysuria, no incontinence, no hesitancy, no nocturia  Musculoskeletal: No complaints of arthralgia, no myalgias, no joint swelling or stiffness, no limb pain or swelling  Neurological: No complaints of headache, no confusion, no convulsions, no numbness or tingling, no dizziness or fainting, no limb weakness, no difficulty walking  Skin: No complaints of skin rash or skin lesions, no itching, no skin wound, no dry skin  Hematological/Lymphatic: No complaints of swollen glands, does not bleed easy  Allergic/Immunologic: No immunocompromised state    Endocrine:  No complaints of polyuria, no polydipsia  Psychiatric/Behavioral: is not suicidal, no sleep disturbances, no anxiety or depression, no change in personality, no emotional problems  Historical Information   Past Medical History:   Diagnosis Date    Asthma     Diabetes mellitus ([facility] [address] )     Hypertension      Past Surgical History:   Procedure Laterality Date    CHOLECYSTECTOMY      SHOULDER SURGERY  2015     Social History   Social History     Substance and Sexual Activity   Alcohol Use Yes    Frequency: Monthly or less    Drinks per session: 1 or 2    Binge frequency: Never    Comment: Liquor, wine     Social History     Substance and Sexual Activity   Drug Use Never     Social History     Tobacco Use   Smoking Status Never Smoker   Smokeless Tobacco Never Used     Family History   Problem Relation Age of Onset    Hypertension Mother     Diabetes Mother     Cancer Father     Hypertension Father     Hypertension Brother          Current Medications: has a current medication list which includes the following prescription(s): amlodipine, aspirin, atorvastatin, cholecalciferol, glucose blood, onetouch ultrasoft, lisinopril, metformin, metoprolol succinate, montelukast, omega-3-acid ethyl esters, and sildenafil  Vital Signs: /87   Pulse 63   Ht 5' 9" (1 753 m)   Wt 99 3 kg (219 lb)   BMI 32 34 kg/m²     Physical Exam:   Constitutional  General Appearance: No acute distress, well appearing and well nourished  Head  Normocephalic  Eyes  Conjunctivae and lids: No swelling, erythema, or discharge  Pupils and irises: Equal, round and reactive to light  Ears, Nose, Mouth, and Throat  External inspection of ears and nose: Normal  Nasal mucosa, septum and turbinates: Normal without edema or erythema/   Oropharynx: Normal with no erythema, edema, exudate or lesions  Neck  Normal range of motion  Neck supple     Cardiovascular  Auscultation of the heart: Normal rate and rhythm, normal S1 and S2 without murmurs  Examination of the extremities for edema and/or varicosities: Normal  Pulmonary/Chest  Respiratory effort: No increased work of breathing or signs of respiratory distress  Auscultation of lungs: Clear to auscultation, equal breath sounds bilaterally, no wheezes, rales, no rhonchi  Abdomen  Abdomen: Non-tender, no masses  Liver and spleen: No hepatomegaly or splenomegaly  Musculoskeletal  Gait and station: normal   Digits and Nails: normal without clubbing or cyanosis  Inspection/palpation of joints, bones, and muscles: Normal  Neurological  No nystagmus or asterixis  Skin  Skin and subcutaneous tissue: Normal without rashes or lesions  Lymphatic  Palpation of the lymph nodes in neck: No lymphadenopathy  Psychiatric  Orientation to person, place and time: Normal   Mood and affect: Normal          Labs:   Lab Results   Component Value Date    ALT 32 03/09/2019    AST 15 03/09/2019    BUN 20 08/05/2019    CALCIUM 8 8 03/10/2019     08/05/2019    CO2 23 08/05/2019    CREATININE 1 16 08/05/2019    HDL 32 (L) 03/10/2019    HCT 38 3 03/10/2019    HGB 12 9 03/10/2019    HGBA1C 6 8 (H) 08/05/2019    MG 1 7 03/10/2019    PHOS 3 5 03/10/2019     03/10/2019    K 5 3 (H) 08/05/2019    PSA 3 5 03/07/2019    TRIG 275 (H) 03/10/2019    WBC 7 59 03/10/2019         X-Rays & Procedures:   No orders to display         ______________________________________________________________________      Assessment & Plan:      Diagnoses and all orders for this visit:    Colon cancer screening  -     Ambulatory referral to Gastroenterology            I have taken liberty of scheduling the patient for colonoscopy  I will be happy to inform you of his results and any further recommendations  I would like to thank you for allowing me to participate in his care          With warmest regards,    Wally Brown MD, [facility]

## 2019-10-10 NOTE — PROGRESS NOTES
[facility]    Dear Abhishek Brice,     I had the pleasure of seeing your patient Theo Alvarez in the office today and I thank you for this kind referral        Chief Complaint:  Need for screening colonoscopy      HPI:  Theo Alvarez is a 60 y o  male who presents with history of average risk screening colonoscopy 11 years ago  At that time no polyps were found  Patient has no abdominal pain, change in bowel habits, change in stool caliber, melanotic stool, hematochezia, rectal bleeding, tenesmus, or weight loss  No family history of colon cancer  He says he has had loose or at least non solid stools for many many years  He also has had ribbon stool for many years  Review of Systems:   Constitutional: No fever or chills, feels well, no tiredness, no recent weight gain or weight loss  HENT: No complaints of earache, no hearing loss, no nosebleeds, no nasal discharge, no sore throat, no hoarseness  Eyes: No complaints of eye pain, no red eyes, no discharge from eyes, no itchy eyes  Cardiovascular: No complaints of slow heart rate, no fast heart rate, no chest pain, no palpitations, no leg claudication, no lower extremity edema  Respiratory: No complaints of shortness of breath, no wheezing, no cough, no SOB on exertion, no orthopnea  Gastrointestinal: As noted in HPI  Genitourinary: No complaints of dysuria, no incontinence, no hesitancy, no nocturia  Musculoskeletal: No complaints of arthralgia, no myalgias, no joint swelling or stiffness, no limb pain or swelling  Neurological: No complaints of headache, no confusion, no convulsions, no numbness or tingling, no dizziness or fainting, no limb weakness, no difficulty walking  Skin: No complaints of skin rash or skin lesions, no itching, no skin wound, no dry skin  Hematological/Lymphatic: No complaints of swollen glands, does not bleed easy  Allergic/Immunologic: No immunocompromised state    Endocrine:  No complaints of polyuria, no polydipsia  Psychiatric/Behavioral: is not suicidal, no sleep disturbances, no anxiety or depression, no change in personality, no emotional problems  Historical Information   Past Medical History:   Diagnosis Date    Asthma     [facility] mellitus ([facility] [address] )     Hypertension      Past Surgical History:   Procedure Laterality Date    CHOLECYSTECTOMY      SHOULDER SURGERY  2015     Social History   Social History     Substance and Sexual Activity   Alcohol Use Yes    Frequency: Monthly or less    Drinks per session: 1 or 2    Binge frequency: Never    Comment: Liquor, wine     Social History     Substance and Sexual Activity   Drug Use Never     Social History     Tobacco Use   Smoking Status Never Smoker   Smokeless Tobacco Never Used     Family History   Problem Relation Age of Onset    Hypertension Mother     Diabetes Mother     Cancer Father     Hypertension Father     Hypertension Brother          Current Medications: has a current medication list which includes the following prescription(s): amlodipine, aspirin, atorvastatin, cholecalciferol, glucose blood, onetouch ultrasoft, lisinopril, metformin, metoprolol succinate, montelukast, omega-3-acid ethyl esters, and sildenafil  Vital Signs: /87   Pulse 63   Ht 5' 9" (1 753 m)   Wt 99 3 kg (219 lb)   BMI 32 34 kg/m²     Physical Exam:   Constitutional  General Appearance: No acute distress, well appearing and well nourished  Head  Normocephalic  Eyes  Conjunctivae and lids: No swelling, erythema, or discharge  Pupils and irises: Equal, round and reactive to light  Ears, Nose, Mouth, and Throat  External inspection of ears and nose: Normal  Nasal mucosa, septum and turbinates: Normal without edema or erythema/   Oropharynx: Normal with no erythema, edema, exudate or lesions  Neck  Normal range of motion  Neck supple     Cardiovascular  Auscultation of the heart: Normal rate and rhythm, normal S1 and S2 without murmurs  Examination of the extremities for edema and/or varicosities: Normal  Pulmonary/Chest  Respiratory effort: No increased work of breathing or signs of respiratory distress  Auscultation of lungs: Clear to auscultation, equal breath sounds bilaterally, no wheezes, rales, no rhonchi  Abdomen  Abdomen: Non-tender, no masses  Liver and spleen: No hepatomegaly or splenomegaly  Musculoskeletal  Gait and station: normal   Digits and Nails: normal without clubbing or cyanosis  Inspection/palpation of joints, bones, and muscles: Normal  Neurological  No nystagmus or asterixis  Skin  Skin and subcutaneous tissue: Normal without rashes or lesions  Lymphatic  Palpation of the lymph nodes in neck: No lymphadenopathy  Psychiatric  Orientation to person, place and time: Normal   Mood and affect: Normal          Labs:   Lab Results   Component Value Date    ALT 32 03/09/2019    AST 15 03/09/2019    BUN 20 08/05/2019    CALCIUM 8 8 03/10/2019     08/05/2019    CO2 23 08/05/2019    CREATININE 1 16 08/05/2019    HDL 32 (L) 03/10/2019    HCT 38 3 03/10/2019    HGB 12 9 03/10/2019    HGBA1C 6 8 (H) 08/05/2019    MG 1 7 03/10/2019    PHOS 3 5 03/10/2019     03/10/2019    K 5 3 (H) 08/05/2019    PSA 3 5 03/07/2019    TRIG 275 (H) 03/10/2019    WBC 7 59 03/10/2019         X-Rays & Procedures:   No orders to display         ______________________________________________________________________      Assessment & Plan:      Diagnoses and all orders for this visit:    Colon cancer screening  -     Ambulatory referral to Gastroenterology            I have taken liberty of scheduling the patient for colonoscopy  I will be happy to inform you of his results and any further recommendations  I would like to thank you for allowing me to participate in his care          With warmest regards,    Nan Ro MD, [facility]

## 2019-10-14 ENCOUNTER — OFFICE VISIT (OUTPATIENT)
Dept: DERMATOLOGY | Facility: CLINIC | Age: 59
End: 2019-10-14
Payer: COMMERCIAL

## 2019-10-14 DIAGNOSIS — D17.9 FIBROMA MOLLE: Primary | ICD-10-CM

## 2019-10-14 DIAGNOSIS — D18.00 CONGENITAL HEMANGIOMA: ICD-10-CM

## 2019-10-14 DIAGNOSIS — L91.8 SKIN TAG: ICD-10-CM

## 2019-10-14 DIAGNOSIS — L72.9 FOLLICULAR CYST OF SKIN: ICD-10-CM

## 2019-10-14 DIAGNOSIS — D22.9 NEVUS: ICD-10-CM

## 2019-10-14 DIAGNOSIS — Z13.89 SCREENING FOR SKIN CONDITION: ICD-10-CM

## 2019-10-14 PROCEDURE — 99203 OFFICE O/P NEW LOW 30 MIN: CPT | Performed by: DERMATOLOGY

## 2019-10-14 PROCEDURE — 88304 TISSUE EXAM BY PATHOLOGIST: CPT | Performed by: PATHOLOGY

## 2019-10-14 PROCEDURE — 11302 SHAVE SKIN LESION 1.1-2.0 CM: CPT | Performed by: DERMATOLOGY

## 2019-10-14 NOTE — PROGRESS NOTES
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  Past Medical History:   Diagnosis Date    Asthma     Diabetes mellitus ([address] )     Hypertension      Past Surgical History:   Procedure Laterality Date    CHOLECYSTECTOMY      SHOULDER SURGERY       Social History   Social History     Substance and Sexual Activity   Alcohol Use Yes    Frequency: Monthly or less    Drinks per session: 1 or 2    Binge frequency: Never    Comment: Liquor, wine     Social History     Substance and Sexual Activity   Drug Use Never     Social History     Tobacco Use   Smoking Status Never Smoker   Smokeless Tobacco Never Used     Family History   Problem Relation Age of Onset    Hypertension Mother     Diabetes Mother     Cancer Father     Hypertension Father     Hypertension Brother      Meds/Allergies   No Known Allergies    Meds:  Prior to Admission medications    Medication Sig Start Date End Date Taking?  Authorizing Provider   amLODIPine (NORVASC) 5 mg tablet Take 1 tablet (5 mg total) by mouth daily 19  Yes Joe Patel MD   aspirin (ECOTRIN LOW STRENGTH) 81 mg EC tablet Take 81 mg by mouth daily   Yes Historical Provider, MD   atorvastatin (LIPITOR) 20 mg tablet Take 1 tablet (20 mg total) by mouth daily 3/26/19  Yes Joe Patel MD   cholecalciferol (VITAMIN D3) 1,000 units tablet Take 1 tablet (1,000 Units total) by mouth daily 3/6/19  Yes Lizzeth Artis MD   glucose blood test strip 1 each by Other route 4 (four) times daily (after meals and at bedtime) Test once daily or as directed 3/19/19  Yes Girish Gregg PA-C   Lancets [facility] ULTRASOFT) lancets Test once daily or as directed 3/18/19  Yes Lizzeth Artis MD   lisinopril (ZESTRIL) 40 mg tablet Take 1 tablet (40 mg total) by mouth daily 9/30/19  Yes Lizzeth Artis MD   metFORMIN (GLUCOPHAGE-XR) 500 mg 24 hr tablet 4 TABLETS DAILY 3/26/19  Yes Lizzeth Artis MD   metoprolol succinate (TOPROL-XL) 25 mg 24 hr tablet Take 1-1/2 tablets daily at bedtime 10/7/19  Yes Lizzeth Artis MD   montelukast (SINGULAIR) 10 mg tablet Take 1 tablet (10 mg total) by mouth daily at bedtime 9/30/19  Yes Lizzeth Artis MD   bvogy-8-txim ethyl esters (LOVAZA) 1 g capsule Take 2 capsules (2 g total) by mouth 2 (two) times a day 4/22/19  Yes Lizzeth Artis MD   sildenafil (REVATIO) 20 mg tablet Take 2 tablets 1 hour before intercourse 7/15/19  Yes Lizzeth Artis MD       Subjective:     Review of Systems:    General: negative for - chills, fatigue, fever,  weight gain or weight loss  Psychological: negative for - anxiety, behavioral disorder, concentration difficulties, decreased libido, depression, irritability, memory difficulties, mood swings, sleep disturbances or suicidal ideation  ENT: negative for - hearing difficulties , nasal congestion, nasal discharge, oral lesions, sinus pain, sneezing, sore throat  Allergy and Immunology: negative for - hives, insect bite sensitivity,  Hematological and Lymphatic: negative for - bleeding problems, blood clots,bruising, swollen lymph nodes  Endocrine: negative for - hair pattern changes, hot flashes, malaise/lethargy, mood swings, palpitations, polydipsia/polyuria, skin changes, temperature intolerance or unexpected weight change  Respiratory: negative for - cough, hemoptysis, orthopnea, shortness of breath, or wheezing  Cardiovascular: negative for - chest pain, dyspnea on exertion, edema,  Gastrointestinal: negative for - abdominal pain, nausea/vomiting  Genito-Urinary: negative for - dysuria, incontinence, irregular/heavy menses or urinary frequency/urgency  Musculoskeletal: negative for - gait disturbance, joint pain, joint stiffness, joint swelling, muscle pain, muscular weakness  Dermatological:  As in HPI  Neurological: negative for confusion, dizziness, headaches, impaired coordination/balance, memory loss, numbness/tingling, seizures, speech problems, tremors or weakness       Objective: There were no vitals taken for this visit  Physical Exam:    General Appearance:    Alert, cooperative, no distress   Head:    Normocephalic, without obvious abnormality, atraumatic           Skin:   A full skin exam was performed including scalp, head scalp, eyes, ears, nose, lips, neck, chest, axilla, abdomen, back, buttocks, bilateral upper extremities, bilateral lower extremities, hands, feet, fingers, toes, fingernails, and toenails 3 cm cystic nodule midback pedunculated 3 cm area noted on the right groin fold numerous fleshy pedunculated papules noted on the neck axilla normal pigmented lesions all with regular shape and color nothing markedly atypical noted on exam large hemangioma noted on the left lower leg without papular component  Shave excision Procedure Note    Pre-operative Diagnosis:  Fibroma molle    Plan:  1  Instructed to keep the wound dry and covered for 24 and clean thereafter  2  Warning signs of infection were reviewed  3  Recommended that the patient use OTC acetaminophen as needed for pain       Locations:  Right groin  Indications:  Growing inflamed irritated lesion    Anesthesia: Lidocaine 1% with epinephrine without added sodium bicarbonate    Procedure Details     Patient informed of the risks (including bleeding and infection) and benefits of the   procedure and Verbal informed consent obtained  The lesion and surrounding area were prepped using alcohol  A Loop cautery was used to remove the lesion and for hemostasis Petrolatum and a sterile dressing applied  The specimen was sent for pathologic examination  The patient tolerated the procedure(s) well  Complications:  none  Assessment:     1  Fibroma molle     2  Nevus     3  Congenital hemangioma     4  Screening for skin condition     5  Follicular cyst of skin     6  Skin tag           Plan:   Fibroma removed because the patient concern and growth  Congenital hemangioma no treatment indicated  Follicular cyst advised patient that this is from hair follicles that ballooned out and forms this type of growth  No treatment indicated unless patient so desires or if lesion enlarges or changes  Skin tags advised patient that these are normal growths that we acquire sometimes related to diabetes and weight as well as genetics can be removed but considered cosmetic  Screening for Dermatologic Disorders: Nothing else of concern noted on complete exam follow up in 1 year         Cornelio Conrad MD  10/14/2019,1:30 PM    Portions of the record may have been created with voice recognition software   Occasional wrong word or "sound a like" substitutions may have occurred due to the inherent limitations of voice recognition software   Read the chart carefully and recognize, using context, where substitutions have occurred

## 2019-10-14 NOTE — PATIENT INSTRUCTIONS
Fibroma removed because the patient concern and growth  Congenital hemangioma no treatment indicated  Follicular cyst advised patient that this is from hair follicles that ballooned out and forms this type of growth  No treatment indicated unless patient so desires or if lesion enlarges or changes    Skin tags advised patient that these are normal growths that we acquire sometimes related to diabetes and weight as well as genetics can be removed but considered cosmetic  Screening for Dermatologic Disorders: Nothing else of concern noted on complete exam follow up in 1 year

## 2019-10-21 ENCOUNTER — TELEPHONE (OUTPATIENT)
Dept: DERMATOLOGY | Facility: CLINIC | Age: 59
End: 2019-10-21

## 2019-10-21 NOTE — TELEPHONE ENCOUNTER
----- Message from Didi Johnson MD sent at 10/21/2019  7:55 AM EDT -----  Please call him of normal pathology

## 2019-10-22 ENCOUNTER — CONSULT (OUTPATIENT)
Dept: CARDIOLOGY CLINIC | Facility: CLINIC | Age: 59
End: 2019-10-22
Payer: COMMERCIAL

## 2019-10-22 ENCOUNTER — TELEPHONE (OUTPATIENT)
Dept: GASTROENTEROLOGY | Facility: CLINIC | Age: 59
End: 2019-10-22

## 2019-10-22 VITALS
HEIGHT: 69 IN | WEIGHT: 216 LBS | BODY MASS INDEX: 31.99 KG/M2 | SYSTOLIC BLOOD PRESSURE: 126 MMHG | DIASTOLIC BLOOD PRESSURE: 78 MMHG | HEART RATE: 60 BPM

## 2019-10-22 DIAGNOSIS — Z82.49 FAMILY HISTORY OF EARLY CAD: ICD-10-CM

## 2019-10-22 DIAGNOSIS — I10 ESSENTIAL HYPERTENSION: ICD-10-CM

## 2019-10-22 DIAGNOSIS — E78.2 MIXED HYPERLIPIDEMIA: ICD-10-CM

## 2019-10-22 DIAGNOSIS — R07.89 CHEST PRESSURE: ICD-10-CM

## 2019-10-22 DIAGNOSIS — Z01.810 PRE-OPERATIVE CARDIOVASCULAR EXAMINATION: Primary | ICD-10-CM

## 2019-10-22 PROCEDURE — 99245 OFF/OP CONSLTJ NEW/EST HI 55: CPT | Performed by: PHYSICIAN ASSISTANT

## 2019-10-22 NOTE — PATIENT INSTRUCTIONS
Cholesterol and Your Health   AMBULATORY CARE:   Cholesterol  is a waxy, fat-like substance  Cholesterol is made by your body, but also comes from certain foods you eat  Your body uses cholesterol to make hormones and new cells  Your body also uses cholesterol to protect nerves  Cholesterol comes from foods such as meat and dairy products  Your total cholesterol level is made up by LDL cholesterol, HDL cholesterol, and triglycerides:  · LDL cholesterol  is called bad cholesterol  because it forms plaque in your arteries  As plaque builds up, your arteries become narrow, and less blood flows through  When plaque decreases blood flow to your heart, you may have chest pain  If plaque completely blocks an artery that bring blood to your heart, you may have a heart attack  Plaque can break off and form blood clots  Blood clots may block arteries in your brain and cause a stroke  · HDL cholesterol  is called good cholesterol  because it helps remove LDL cholesterol from your arteries  It does this by attaching to LDL cholesterol and carrying it to your liver  Your liver breaks down LDL cholesterol so your body can get rid of it  High levels of HDL cholesterol can help prevent a heart attack and stroke  Low levels of HDL cholesterol can increase your risk for heart disease, heart attack, and stroke  · Triglycerides  are a type of fat that store energy from foods you eat  High levels of triglycerides also cause plaque buildup  This can increase your risk for a heart attack or stroke  If your triglyceride level is high, your LDL cholesterol level may also be high  How food affects your cholesterol levels:   · Unhealthy fats  increase LDL cholesterol and triglyceride levels in your blood  They are found in foods high in cholesterol, saturated fat, and trans fat:     ¨ Cholesterol  is found in eggs, dairy, and meat  ¨ Saturated fat  is found in butter, cheese, ice cream, whole milk, and coconut oil  Saturated fat is also found in meat, such as sausage, hot dogs, and bologna  ¨ Trans fat  is found in liquid oils and is used in fried and baked foods  Foods that contain trans fats include chips, crackers, muffins, sweet rolls, microwave popcorn, and cookies  · Healthy fats,  also called unsaturated fats, help lower LDL cholesterol and triglyceride levels  Healthy fats include the following:     ¨ Monounsaturated fats  are found in foods such as olive oil, canola oil, avocado, nuts, and olives  ¨ Polyunsaturated fats,  such as omega 3 fats, are found in fish, such as salmon, trout, and tuna  They can also be found in plant foods such as flaxseed, walnuts, and soybeans  Other things that affect your cholesterol levels:   · Smoking cigarettes    · Being overweight or obese     · Drinking large amounts of alcohol    · Not enough exercise or no exercise    · Certain genes passed from your parents to you  What you need to know about having your cholesterol levels checked: Adults 21to 39years of age should have their cholesterol levels checked every 4 to 6 years  Adults 45 years and older should have their cholesterol checked every 1 to 2 years  You may need your cholesterol checked more often, or at a younger age, if you have risk factors for heart disease  You may also need to have your cholesterol checked more often if you have other health conditions, such as diabetes  Blood tests are used to check cholesterol levels  Blood tests measure your levels of triglycerides, LDL cholesterol, and HDL cholesterol  Cholesterol level goals: Your cholesterol level goal may depend on your risk for heart disease  It may also depend on your age and other health conditions  Ask your healthcare provider if the following goals are right for you:  · Your total cholesterol level  should be less than 200 mg/dL  This number may also depend on your HDL and LDL cholesterol goals       · Your LDL cholesterol level  should be less than 130 mg/dL  · Your HDL cholesterol level  should be 60 mg/dL or higher  · Your triglyceride level  should be less than 150 mg/dL  Treatment for high cholesterol:  Treatment for high cholesterol will also decrease your risk of heart disease, heart attack, and stroke  Treatment may include any of the following:  · Medicines  may be given to lower your LDL cholesterol, triglyceride levels, or total cholesterol level  You may need medicines to lower your cholesterol if any of the following is true:     ¨ You have a history of stroke, TIA, unstable angina, or a heart attack    ¨ Your LDL cholesterol level is 190 mg/dL or higher    ¨ You are age 36to 76years of age, have diabetes, and your LDL cholesterol is 70 mg/dL or higher    ¨ You are age 36to 76years of age, have risk factors for heart disease, and your LDL cholesterol is 70 mg/dL or higher    · Lifestyle changes  include changes to your diet, exercise, weight loss, and quitting smoking  It also includes decreasing the amount of alcohol you drink  · Supplements  include fish oil, red yeast rice, and garlic  Fish oil may help lower your triglyceride and LDL cholesterol levels  It may also increase your HDL cholesterol level  Red yeast rice may help decrease your total cholesterol level and LDL cholesterol level  Garlic may help lower your total cholesterol level  Do not take these supplements without talking to your healthcare provider  Nutrition to help lower your cholesterol levels:  A registered dietitian can help you create a healthy eating plan  Read food labels and choose foods low in saturated fat, trans fats, and cholesterol  · Decrease the total amount of fat you eat  Choose lean meats, fat-free or 1% fat milk, and low-fat dairy products, such as yogurt and cheese  Try to limit or avoid red meats  Limit or do not eat fried foods or baked goods such as cookies  · Replace unhealthy fats with healthy fats    Cook foods in olive oil or canola oil  Choose soft margarines that are low in saturated fat and trans fat  Seeds, nuts, and avocados are other examples of healthy fats  · Eat foods with omega-3 fats  Examples include salmon, tuna, mackerel, walnuts, and flaxseed  Eat fish 2 times per week  Children and pregnant women should not eat fish that have high levels of mercury, such as shark, swordfish, and nazanin mackerel  · Increase the amount of plant-based foods you eat  Plant-based foods are low in cholesterol and fat  Eating more of these foods may help lower your cholesterol and help you lose weight  Examples of plant-based foods includes fruits, vegetables, legumes, and whole grains  Replace milk that contains dairy with almond, soy, or coconut milk  Eat beans and foods with soy for protein instead of meat  Ask your healthcare provider or dietitian for more information on plant-based foods  · Increase the amount of fiber you eat  High-fiber foods can help lower your LDL cholesterol  You should eat between 20 and 30 grams of fiber each day  Eat at least 5 servings of fruits and vegetables each day  Other examples of high-fiber foods include whole-grain or whole-wheat breads, pastas, or cereals, and brown rice  Eat 3 ounces of whole-grain foods each day  Increase fiber slowly  You may have abdominal discomfort, bloating, and gas if you add fiber to your diet too quickly  Lifestyle changes you can make to help lower your cholesterol levels:   · Maintain a healthy weight  Ask your healthcare provider how much you should weigh  Ask him or her to help you create a weight loss plan if you are overweight  Weight loss can decrease your total cholesterol and triglyceride levels  · Exercise regularly  Exercise can help lower your total cholesterol level and maintain a healthy weight  Exercise can also help increase your HDL cholesterol level   Work with your healthcare provider to create an exercise program that is right for you  Get at least 30 minutes of moderate exercise most days of the week  Examples of exercise include brisk walking, swimming, or biking  · Do not smoke  Nicotine and other chemicals in cigarettes and cigars can damage your lungs, heart, and blood vessels  They can also raise your triglyceride levels  Ask your healthcare provider for information if you currently smoke and need help to quit  E-cigarettes or smokeless tobacco still contain nicotine  Talk to your healthcare provider before you use these products  · Limit or do not drink alcohol  Alcohol can increase your triglyceride levels  Ask your healthcare provider if it is safe for you to drink alcohol  Also ask how much is safe for you to drink each day  © 2017 [employer]  Information is for End User's use only and may not be sold, redistributed or otherwise used for commercial purposes  All illustrations and images included in CareNotes® are the copyrighted property of [employer]  or Augie Vidal  The above information is an  only  It is not intended as medical advice for individual conditions or treatments  Talk to your doctor, nurse or pharmacist before following any medical regimen to see if it is safe and effective for you  Low-Sodium Diet   WHAT YOU NEED TO KNOW:   A low-sodium diet limits foods that are high in sodium (salt)  You will need to follow a low-sodium diet if you have high blood pressure, kidney disease, or heart failure  You may also need to follow this diet if you have a condition that is causing your body to retain (hold) extra fluid  You may need to limit the amount of sodium you eat to 1,500 mg  Ask your healthcare provider how much sodium you can have each day  DISCHARGE INSTRUCTIONS:   How to use food labels to choose foods that are low in sodium:  Read food labels to find the amount of sodium they contain  The amount of sodium is listed in milligrams (mg)   The % Daily Value (DV) column tells you how much of your daily needs are met by 1 serving of the food for each nutrient listed  Choose foods that have less than 5% of the DV of sodium  These foods are considered low in sodium  Foods that have 20% or more of the DV of sodium are considered high in sodium  Some food labels may also list any of the following terms that tell you about the sodium content in the food:  · Sodium-free:  Less than 5 mg in each serving    · Very low sodium:  35 mg of sodium or less in each serving    · Low sodium:  140 mg of sodium or less in each serving    · Reduced sodium: At least 25% less sodium in each serving than the regular type    · Light in sodium:  50% less sodium in each serving    · Unsalted or no added salt:  No extra salt is added during processing (the food may still contain sodium)  Foods to avoid:  Salty foods are high in sodium   You should avoid the following:  · Processed foods:      ¨ Mixes for cornbread, biscuits, cake, and pudding     ¨ Instant foods, such as potatoes, cereals, noodles, and rice     ¨ Packaged foods, such as bread stuffing, rice and pasta mixes, snack dip mixes, and macaroni and cheese     ¨ Canned foods, such as canned vegetables, soups, broths, sauces, and vegetable or tomato juice    ¨ Snack foods, such as salted chips, popcorn, pretzels, pork rinds, salted crackers, and salted nuts    ¨ Frozen foods, such as dinners, entrees, vegetables with sauces, and breaded meats    ¨ Sauerkraut, pickled vegetables, and other foods prepared in brine    · Meats and cheeses:      ¨ Smoked or cured meat, such as corned beef, gillette, ham, hot dogs, and sausage    ¨ Canned meats or spreads, such as potted meats, sardines, anchovies, and imitation seafood    ¨ Deli or lunch meats, such as bologna, ham, turkey, and roast beef    ¨ Processed cheese, such as American cheese and cheese spreads    · Condiments, sauces, and seasonings:      ¨ Salt (¼ teaspoon of salt contains 575 mg of sodium)    ¨ Seasonings made with salt, such as garlic salt, celery salt, onion salt, and seasoned salt    ¨ Regular soy sauce, barbecue sauce, teriyaki sauce, steak sauce, Worcestershire sauce, and most flavored vinegars    ¨ Canned gravy and mixes     ¨ Regular condiments, such as mustard, ketchup, and salad dressings    ¨ Pickles and olives    ¨ Meat tenderizers and monosodium glutamate (MSG)  Foods to include:  Read the food label to find the amount of sodium in each serving  · Bread and cereal:  Try to choose breads with less than 80 mg of sodium per serving  ¨ Bread, roll, adri, tortilla, or unsalted crackers  ¨ Ready-to-eat cereals with less than 5% DV of sodium (examples include shredded wheat and puffed rice)    ¨ Pasta    · Vegetables and fruits:      ¨ Unsalted fresh, frozen, or canned vegetables    ¨ Fresh, frozen, or canned fruits    ¨ Fruit juice    · Dairy:  One serving has about 150 mg of sodium  ¨ Milk, all types    ¨ Yogurt    ¨ Hard cheese, such as cheddar, Swiss, [employer], or mozzarella    · Meat and other protein foods:  Some raw meats may have added sodium  ¨ Plain meats, fish, and poultry     ¨ Egg    · Other foods:      ¨ Homemade pudding    ¨ Unsalted nuts, popcorn, or pretzels    ¨ Unsalted butter or margarine  Ways to decrease sodium:   · Add spices and herbs to foods instead of salt during cooking  Use salt-free seasonings to add flavor to foods  Examples include onion powder, garlic powder, basil, allen powder, paprika, and parsley  Try lemon or lime juice or vinegar to give foods a tart flavor  Use hot peppers, pepper, or cayenne pepper to add a spicy flavor to foods  · Do not keep a salt shaker at your kitchen table  This may help keep you from adding salt to food at the table  It may take time to get used to enjoying the natural flavor of food instead of adding salt  Talk to your healthcare provider before you use salt substitutes   Some salt substitutes have a high amount of potassium and need to be avoided if you have kidney disease  · Choose low-sodium foods at restaurants  Meals from restaurants are often high in sodium  Some restaurants have nutrition information on the menu that tells you the amount of sodium in their foods  If possible, ask for your food to be prepared with less, or no salt  · Shop for unsalted or low-sodium foods and snacks at the grocery store  Examples include unsalted or low-sodium broths, soups, and canned vegetables  Choose fresh or frozen vegetables instead  Choose unsalted nuts or seeds or fresh fruits or vegetables as snacks  Read food labels and choose salt-free, very low-sodium, or low-sodium foods  © 2017 [employer] Information is for End User's use only and may not be sold, redistributed or otherwise used for commercial purposes  All illustrations and images included in CareNotes® are the copyrighted property of [employer]  or Augie Vidal  The above information is an  only  It is not intended as medical advice for individual conditions or treatments  Talk to your doctor, nurse or pharmacist before following any medical regimen to see if it is safe and effective for you

## 2019-10-22 NOTE — LETTER
October 22, 2019     Megan Kaiser, [address] [place], [place] [id number] [facility] [address] [address]    Patient: Sam Paredes   YOB: 1960   Date of Visit: 10/22/2019       Dear Dr Micah Rivera:    Thank you for referring Coleman Sauer to me for evaluation  Below are the relevant portions of my H&P  If you have questions, please do not hesitate to call me  I look forward to following Marcos Curtis along with you           Sincerely,        Fiona Canales PA-C        CC: No Recipients

## 2019-10-22 NOTE — PROGRESS NOTES
[facility]   Outpatient Note  Cristina Goins  1960  [id number]  [facility] [place]  [address]  [facility] [place] [address]  K[address] Magallanes[address]    Cristina Goins is a 60 y o  male    Assessment and Plan:     Problem List Items Addressed This Visit        Cardiovascular and Mediastinum    Hypertension     Now controlled on current regimen  Continue medication            Other    Pre-operative cardiovascular examination - Primary     Ok to move forward with colonoscopy without further cardiac testing  He may hold ASA for 5 days prior to procedure and resume on day #1 post operatively if there is no bleeding  Chest pressure     Positional Chest pain while at rest   Echo and stress test are normal    Holter monitor revealed SR with PVC           Mixed hyperlipidemia    Family history of early CAD     Mother  of an MI at the age of 54 and brother at the age of 58  Additional Plan:   No medication changes today  Ok to move forward with procedure as planned  Return visit will be in six months or earlier if there are problems  The patient is encouraged to call in the meantime if there are questions or concerns  Subjective: The patient is referred to the office by his PCP as a new patient for complaints of CP that is described as a lateral chest wall pressure when lying on the left side  He states that it is not exertional and it is not associated with SOB  It is not associated with nausea diaphoresis, palpitations or syncope  He is concerned because about a week ago he was found to have HTN and went to the ED  EKG is reviewed and it is normal   Otherwise He is doing well from a cardiac perspective without complaints of chest pain or exertional dyspnea  There are no complaints of palpitations syncope or near syncope  He denies edema orthopnea or PND    The patient denies TIA or CVA symptoms     Stress test, echo and holter from march of this year are all normal            Social History  Social History     Tobacco Use   Smoking Status Never Smoker   Smokeless Tobacco Never Used   ,   Social History     Substance and Sexual Activity   Alcohol Use Yes    Frequency: Monthly or less    Drinks per session: 1 or 2    Binge frequency: Never    Comment: Liquor, wine   ,   Social History     Substance and Sexual Activity   Drug Use Never     Family History   Problem Relation Age of Onset    Hypertension Mother     Diabetes Mother     Cancer Father     Hypertension Father     Hypertension Brother        Medical and Surgical History  Past Medical History:   Diagnosis Date    Asthma     Diabetes mellitus ([address] )     Hypertension      Past Surgical History:   Procedure Laterality Date    CHOLECYSTECTOMY      SHOULDER SURGERY  2015         Current Outpatient Medications:     amLODIPine (NORVASC) 5 mg tablet, Take 1 tablet (5 mg total) by mouth daily, Disp: 90 tablet, Rfl: 3    aspirin (ECOTRIN LOW STRENGTH) 81 mg EC tablet, Take 81 mg by mouth daily, Disp: , Rfl:     atorvastatin (LIPITOR) 20 mg tablet, Take 1 tablet (20 mg total) by mouth daily, Disp: 90 tablet, Rfl: 3    cholecalciferol (VITAMIN D3) 1,000 units tablet, Take 1 tablet (1,000 Units total) by mouth daily, Disp: 90 tablet, Rfl: 1    glucose blood test strip, 1 each by Other route 4 (four) times daily (after meals and at bedtime) Test once daily or as directed, Disp: 100 each, Rfl: 2    Lancets (ONETOUCH ULTRASOFT) lancets, Test once daily or as directed, Disp: 100 each, Rfl: 3    lisinopril (ZESTRIL) 40 mg tablet, Take 1 tablet (40 mg total) by mouth daily, Disp: 90 tablet, Rfl: 1    metFORMIN (GLUCOPHAGE-XR) 500 mg 24 hr tablet, 4 TABLETS DAILY, Disp: 360 tablet, Rfl: 3    metoprolol succinate (TOPROL-XL) 25 mg 24 hr tablet, Take 1-1/2 tablets daily at bedtime, Disp: 90 tablet, Rfl: 1    montelukast (SINGULAIR) 10 mg tablet, Take 1 tablet (10 mg total) by mouth daily at bedtime, Disp: 90 tablet, Rfl: 1    omega-3-acid ethyl esters (LOVAZA) 1 g capsule, Take 2 capsules (2 g total) by mouth 2 (two) times a day, Disp: 360 capsule, Rfl: 1    sildenafil (REVATIO) 20 mg tablet, Take 2 tablets 1 hour before intercourse, Disp: 30 tablet, Rfl: 0  No Known Allergies    Review of Systems   Constitution: Negative  HENT: Negative  Eyes: Negative  Cardiovascular: Positive for chest pain (left wall pressure when lying on left side)  Negative for claudication, cyanosis, dyspnea on exertion, irregular heartbeat, leg swelling, near-syncope, orthopnea, palpitations, paroxysmal nocturnal dyspnea and syncope  Respiratory: Negative  Negative for cough, hemoptysis, shortness of breath, sleep disturbances due to breathing, snoring, sputum production and wheezing  Endocrine: Negative  Hematologic/Lymphatic: Negative  Skin: Negative  Musculoskeletal: Negative  Gastrointestinal: Negative  Genitourinary: Negative  Neurological: Negative  Psychiatric/Behavioral: Negative  Allergic/Immunologic: Negative  Objective:   /78   Pulse 60   Ht 5' 9" (1 753 m)   Wt 98 kg (216 lb)   BMI 31 90 kg/m²   Physical Exam   Constitutional: He is oriented to person, place, and time  He appears well-developed and well-nourished  HENT:   Head: Normocephalic and atraumatic  Mouth/Throat: Oropharynx is clear and moist    Eyes: Conjunctivae are normal  No scleral icterus  Neck: Normal range of motion  Neck supple  No JVD present  No thyromegaly present  Cardiovascular: Normal rate, regular rhythm and normal heart sounds  Exam reveals no gallop and no friction rub  No murmur heard  Pulmonary/Chest: No respiratory distress  He has no wheezes  He has no rales  Abdominal: Soft  Bowel sounds are normal  He exhibits no distension  There is no tenderness  Aorta not palpable   Musculoskeletal: Normal range of motion  He exhibits no edema, tenderness or deformity  Neurological: He is alert and oriented to person, place, and time  Skin: Skin is warm and dry  Psychiatric: He has a normal mood and affect  His behavior is normal    Nursing note and vitals reviewed        Lab Review:   No results found for: CHOL  Lab Results   Component Value Date    HDL 32 (L) 03/10/2019     Lab Results   Component Value Date    LDLCALC 92 03/10/2019     Lab Results   Component Value Date    TRIG 275 (H) 03/10/2019     Results Reviewed     None        Results Reviewed     None        Results Reviewed     None          Recent Cardiovascular Testing:   Holter: 3-14-19: Sinus rhythm with PVC and PACs    Echo 3-11-19: Normal LVF EF 60%, trace MR, TR no WMA    NATI 3-11-19: Normal, No ischemia     ECG Review:   Normal sinus Rhythm

## 2019-10-22 NOTE — ASSESSMENT & PLAN NOTE
Ok to move forward with colonoscopy without further cardiac testing  He may hold ASA for 5 days prior to procedure and resume on day #1 post operatively if there is no bleeding

## 2019-10-22 NOTE — ASSESSMENT & PLAN NOTE
Positional Chest pain while at rest   Echo and stress test are normal    Holter monitor revealed SR with PVC

## 2019-10-22 NOTE — LETTER
October 22, 2019     Faith Zheng MD  [address]/[place] [facility] [address]    Patient: Coco Clark   YOB: 1960   Date of Visit: 10/22/2019       Dear Dr Bret Puentes: Thank you for referring Jean Garcia to me for evaluation  Below are my notes for this consultation  If you have questions, please do not hesitate to call me  I look forward to following your patient along with you           Sincerely,        Whitley Schafer PA-C        CC: No Recipients  Whitley Schafer PA-C  10/22/2019 12:15 PM  Incomplete  [address] [address] [facility]   Outpatient Note  Coco Clark  1960  [id number]  [facility]  [address] [facility] [address]  [facility] [address]    Coco Clark is a 60 y o  male    Assessment and Plan:     Problem List Items Addressed This Visit        Cardiovascular and Mediastinum    Hypertension - Primary    Hypertensive heart disease without heart failure       Other    Chest pressure           Additional Plan:   ***    Subjective:   HPI    Social History  Social History     Tobacco Use   Smoking Status Never Smoker   Smokeless Tobacco Never Used   ,   Social History     Substance and Sexual Activity   Alcohol Use Yes    Frequency: Monthly or less    Drinks per session: 1 or 2    Binge frequency: Never    Comment: Liquor, wine   ,   Social History     Substance and Sexual Activity   Drug Use Never     Family History   Problem Relation Age of Onset    Hypertension Mother     Diabetes Mother     Cancer Father     Hypertension Father     Hypertension Brother        Medical and Surgical History  Past Medical History:   Diagnosis Date    Asthma     Diabetes mellitus ([address] )     Hypertension      Past Surgical History:   Procedure Laterality Date    CHOLECYSTECTOMY      SHOULDER SURGERY  2015         Current Outpatient Medications:     amLODIPine (NORVASC) 5 mg tablet, Take 1 tablet (5 mg total) by mouth daily, Disp: 90 tablet, Rfl: 3    aspirin (ECOTRIN LOW STRENGTH) 81 mg EC tablet, Take 81 mg by mouth daily, Disp: , Rfl:     atorvastatin (LIPITOR) 20 mg tablet, Take 1 tablet (20 mg total) by mouth daily, Disp: 90 tablet, Rfl: 3    cholecalciferol (VITAMIN D3) 1,000 units tablet, Take 1 tablet (1,000 Units total) by mouth daily, Disp: 90 tablet, Rfl: 1    glucose blood test strip, 1 each by Other route 4 (four) times daily (after meals and at bedtime) Test once daily or as directed, Disp: 100 each, Rfl: 2    Lancets (ONETOUCH ULTRASOFT) lancets, Test once daily or as directed, Disp: 100 each, Rfl: 3    lisinopril (ZESTRIL) 40 mg tablet, Take 1 tablet (40 mg total) by mouth daily, Disp: 90 tablet, Rfl: 1    metFORMIN (GLUCOPHAGE-XR) 500 mg 24 hr tablet, 4 TABLETS DAILY, Disp: 360 tablet, Rfl: 3    metoprolol succinate (TOPROL-XL) 25 mg 24 hr tablet, Take 1-1/2 tablets daily at bedtime, Disp: 90 tablet, Rfl: 1    montelukast (SINGULAIR) 10 mg tablet, Take 1 tablet (10 mg total) by mouth daily at bedtime, Disp: 90 tablet, Rfl: 1    omega-3-acid ethyl esters (LOVAZA) 1 g capsule, Take 2 capsules (2 g total) by mouth 2 (two) times a day, Disp: 360 capsule, Rfl: 1    sildenafil (REVATIO) 20 mg tablet, Take 2 tablets 1 hour before intercourse, Disp: 30 tablet, Rfl: 0  No Known Allergies    ROS    Objective:   /78   Pulse 60   Ht 5' 9" (1 753 m)   Wt 98 kg (216 lb)   BMI 31 90 kg/m²    Physical Exam    Lab Review:   No results found for: CHOL  Lab Results   Component Value Date    HDL 32 (L) 03/10/2019     Lab Results   Component Value Date    LDLCALC 92 03/10/2019     Lab Results   Component Value Date    TRIG 275 (H) 03/10/2019     Results Reviewed     None        Results Reviewed     None        Results Reviewed     None          Recent Cardiovascular Testing:   ***    ECG Review:   ***

## 2019-10-28 ENCOUNTER — ANESTHESIA EVENT (OUTPATIENT)
Dept: GASTROENTEROLOGY | Facility: HOSPITAL | Age: 59
End: 2019-10-28

## 2019-10-28 LAB
LEFT EYE DIABETIC RETINOPATHY: NORMAL
RIGHT EYE DIABETIC RETINOPATHY: NORMAL

## 2019-10-28 RX ORDER — SODIUM CHLORIDE, SODIUM LACTATE, POTASSIUM CHLORIDE, CALCIUM CHLORIDE 600; 310; 30; 20 MG/100ML; MG/100ML; MG/100ML; MG/100ML
125 INJECTION, SOLUTION INTRAVENOUS CONTINUOUS
Status: CANCELLED | OUTPATIENT
Start: 2019-10-28

## 2019-10-28 NOTE — ANESTHESIA PREPROCEDURE EVALUATION
Review of Systems/Medical History  Patient summary reviewed  Chart reviewed      Cardiovascular  Hyperlipidemia, Hypertension ,    Pulmonary  Asthma , well controlled/ stable ,        GI/Hepatic       Kidney stones,        Endo/Other  Diabetes type 2 ,      GYN       Hematology   Musculoskeletal       Neurology   Psychology           Physical Exam    Airway    Mallampati score: III  TM Distance: >3 FB  Neck ROM: full     Dental       Cardiovascular  Cardiovascular exam normal    Pulmonary  Pulmonary exam normal     Other Findings        Anesthesia Plan  ASA Score- 2     Anesthesia Type- IV sedation with anesthesia with ASA Monitors  Additional Monitors:   Airway Plan:         Plan Factors-    Induction- intravenous  Postoperative Plan-     Informed Consent- Anesthetic plan and risks discussed with patient  I personally reviewed this patient with the CRNA  Discussed and agreed on the Anesthesia Plan with the CRNA  Lorena Melendez

## 2019-10-29 ENCOUNTER — ANESTHESIA (OUTPATIENT)
Dept: GASTROENTEROLOGY | Facility: HOSPITAL | Age: 59
End: 2019-10-29

## 2019-10-29 ENCOUNTER — HOSPITAL ENCOUNTER (OUTPATIENT)
Dept: GASTROENTEROLOGY | Facility: HOSPITAL | Age: 59
Setting detail: OUTPATIENT SURGERY
Discharge: HOME | End: 2019-10-29
Attending: INTERNAL MEDICINE | Admitting: INTERNAL MEDICINE
Payer: COMMERCIAL

## 2019-10-29 VITALS
OXYGEN SATURATION: 95 % | RESPIRATION RATE: 16 BRPM | DIASTOLIC BLOOD PRESSURE: 76 MMHG | HEIGHT: 69 IN | BODY MASS INDEX: 31.48 KG/M2 | WEIGHT: 212.52 LBS | SYSTOLIC BLOOD PRESSURE: 119 MMHG | HEART RATE: 55 BPM | TEMPERATURE: 98.7 F

## 2019-10-29 DIAGNOSIS — Z12.11 COLON CANCER SCREENING: ICD-10-CM

## 2019-10-29 LAB — GLUCOSE SERPL-MCNC: 114 MG/DL (ref 65–140)

## 2019-10-29 PROCEDURE — 88305 TISSUE EXAM BY PATHOLOGIST: CPT | Performed by: PATHOLOGY

## 2019-10-29 PROCEDURE — 82948 REAGENT STRIP/BLOOD GLUCOSE: CPT

## 2019-10-29 PROCEDURE — 45385 COLONOSCOPY W/LESION REMOVAL: CPT | Performed by: INTERNAL MEDICINE

## 2019-10-29 RX ORDER — LIDOCAINE HYDROCHLORIDE 10 MG/ML
INJECTION, SOLUTION EPIDURAL; INFILTRATION; INTRACAUDAL; PERINEURAL AS NEEDED
Status: DISCONTINUED | OUTPATIENT
Start: 2019-10-29 | End: 2019-10-29 | Stop reason: SURG

## 2019-10-29 RX ORDER — PROPOFOL 10 MG/ML
INJECTION, EMULSION INTRAVENOUS AS NEEDED
Status: DISCONTINUED | OUTPATIENT
Start: 2019-10-29 | End: 2019-10-29 | Stop reason: SURG

## 2019-10-29 RX ORDER — SODIUM CHLORIDE, SODIUM LACTATE, POTASSIUM CHLORIDE, CALCIUM CHLORIDE 600; 310; 30; 20 MG/100ML; MG/100ML; MG/100ML; MG/100ML
INJECTION, SOLUTION INTRAVENOUS CONTINUOUS PRN
Status: DISCONTINUED | OUTPATIENT
Start: 2019-10-29 | End: 2019-10-29 | Stop reason: SURG

## 2019-10-29 RX ADMIN — PROPOFOL 100 MG: 10 INJECTION, EMULSION INTRAVENOUS at 07:52

## 2019-10-29 RX ADMIN — PROPOFOL 20 MG: 10 INJECTION, EMULSION INTRAVENOUS at 08:05

## 2019-10-29 RX ADMIN — SODIUM CHLORIDE, SODIUM LACTATE, POTASSIUM CHLORIDE, AND CALCIUM CHLORIDE: .6; .31; .03; .02 INJECTION, SOLUTION INTRAVENOUS at 07:50

## 2019-10-29 RX ADMIN — PROPOFOL 30 MG: 10 INJECTION, EMULSION INTRAVENOUS at 07:58

## 2019-10-29 RX ADMIN — PROPOFOL 20 MG: 10 INJECTION, EMULSION INTRAVENOUS at 08:09

## 2019-10-29 RX ADMIN — LIDOCAINE HYDROCHLORIDE 50 MG: 10 INJECTION, SOLUTION EPIDURAL; INFILTRATION; INTRACAUDAL; PERINEURAL at 07:52

## 2019-10-29 RX ADMIN — PROPOFOL 20 MG: 10 INJECTION, EMULSION INTRAVENOUS at 08:01

## 2019-10-29 RX ADMIN — PROPOFOL 30 MG: 10 INJECTION, EMULSION INTRAVENOUS at 08:07

## 2019-10-29 NOTE — ANESTHESIA POSTPROCEDURE EVALUATION
Post-Op Assessment Note    CV Status:  Stable  Pain Score: 0    Pain management: adequate     Mental Status:  Sleepy   Hydration Status:  Stable   PONV Controlled:  None   Airway Patency:  Patent and adequate   Post Op Vitals Reviewed: Yes      Staff: CRNA           /57 (10/29/19 0812)    Temp 98 7 °F (37 1 °C) (10/29/19 0812)    Pulse (!) 53 (10/29/19 0812)   Resp 19 (10/29/19 0812)    SpO2 96 % (10/29/19 1185)

## 2019-10-29 NOTE — DISCHARGE INSTRUCTIONS
Colonoscopy   WHAT YOU NEED TO KNOW:   A colonoscopy is a procedure to examine the inside of your colon (intestine) with a scope  Polyps or tissue growths may have been removed during your colonoscopy  It is normal to feel bloated and to have some abdominal discomfort  You should be passing gas  If you have hemorrhoids or you had polyps removed, you may have a small amount of bleeding  DISCHARGE INSTRUCTIONS:   Seek care immediately if:   · You have a large amount of bright red blood in your bowel movements  · Your abdomen is hard and firm and you have severe pain  · You have sudden trouble breathing  Contact your healthcare provider if:   · You develop a rash or hives  · You have a fever within 24 hours of your procedure  · You have not had a bowel movement for 3 days after your procedure  · You have questions or concerns about your condition or care  Activity:   · Do not lift, strain, or run  for 3 days after your procedure  · Rest after your procedure  You have been given medicine to relax you  Do not  drive or make important decisions until the day after your procedure  Return to your normal activity as directed  · Relieve gas and discomfort from bloating  by lying on your right side with a heating pad on your abdomen  You may need to take short walks to help the gas move out  Eat small meals until bloating is relieved  If you had polyps removed: For 7 days after your procedure:  · Do not  take aspirin  · Do not  go on long car rides  Help prevent constipation:   · Eat a variety of healthy foods  Healthy foods include fruit, vegetables, whole-grain breads, low-fat dairy products, beans, lean meat, and fish  Ask if you need to be on a special diet  Your healthcare provider may recommend that you eat high-fiber foods such as cooked beans  Fiber helps you have regular bowel movements  · Drink liquids as directed    Adults should drink between 9 and 13 eight-ounce cups of liquid every day  Ask what amount is best for you  For most people, good liquids to drink are water, juice, and milk  · Exercise as directed  Talk to your healthcare provider about the best exercise plan for you  Exercise can help prevent constipation, decrease your blood pressure and improve your health  Follow up with your healthcare provider as directed:  Write down your questions so you remember to ask them during your visits  © 2017 [address] [employer] Information is for End User's use only and may not be sold, redistributed or otherwise used for commercial purposes  All illustrations and images included in CareNotes® are the copyrighted property of A D A M , Inc  or Augie Vidal  The above information is an  only  It is not intended as medical advice for individual conditions or treatments  Talk to your doctor, nurse or pharmacist before following any medical regimen to see if it is safe and effective for you  Diverticulosis   WHAT YOU NEED TO KNOW:   Diverticulosis is a condition that causes small pockets called diverticula to form in your intestine  These pockets make it difficult for bowel movements to pass through your digestive system  DISCHARGE INSTRUCTIONS:   Seek care immediately if:   · You have severe pain on the left side of your lower abdomen  · Your bowel movements are bright or dark red  Contact your healthcare provider if:   · You have a fever and chills  · You feel dizzy or lightheaded  · You have nausea, or you are vomiting  · You have a change in your bowel movements  · You have questions or concerns about your condition or care  Medicines:   · Medicines  to soften your bowel movements may be given  You may also need medicines to treat symptoms such as bloating and pain  · Take your medicine as directed  Contact your healthcare provider if you think your medicine is not helping or if you have side effects   Tell him or her if you are allergic to any medicine  Keep a list of the medicines, vitamins, and herbs you take  Include the amounts, and when and why you take them  Bring the list or the pill bottles to follow-up visits  Carry your medicine list with you in case of an emergency  Self-care: The goal of treatment is to manage any symptoms you have and prevent other problems such as diverticulitis  Diverticulitis is swelling or infection of the diverticula  Your healthcare provider may recommend any of the following:  · Eat a variety of high-fiber foods  High-fiber foods help you have regular bowel movements  High-fiber foods include cooked beans, fruits, vegetables, and some cereals  Most adults need 25 to 35 grams of fiber each day  Your healthcare provider may recommend that you have more  Ask your healthcare provider how much fiber you need  Increase fiber slowly  You may have abdominal discomfort, bloating, and gas if you add fiber to your diet too quickly  You may need to take a fiber supplement if you are not getting enough fiber from food  · Drink liquids as directed  You may need to drink 2 to 3 liters (8 to 12 cups) of liquids every day  Ask your healthcare provider how much liquid to drink each day and which liquids are best for you  · Apply heat  on your abdomen for 20 to 30 minutes every 2 hours for as many days as directed  Heat helps decrease pain and muscle spasms  Help prevent diverticulitis or other symptoms: The following may help decrease your risk for diverticulitis or symptoms, such as bleeding  Talk to your provider about these or other things you can do to prevent problems that may occur with diverticulosis  · Exercise regularly  Ask your healthcare provider about the best exercise plan for you  Exercise can help you have regular bowel movements  Get 30 minutes of exercise on most days of the week  · Maintain a healthy weight    Ask your healthcare provider how much you should weigh  Ask him or her to help you create a weight loss plan if you are overweight  · Do not smoke  Nicotine and other chemicals in cigarettes increase your risk for diverticulitis  Ask your healthcare provider for information if you currently smoke and need help to quit  E-cigarettes or smokeless tobacco still contain nicotine  Talk to your healthcare provider before you use these products  · Ask your healthcare provider if it is safe to take NSAIDs  NSAIDs may increase your risk of diverticulitis  Follow up with your healthcare provider as directed:  Write down your questions so you remember to ask them during your visits  © 2017 [address] [facility] Information is for End User's use only and may not be sold, redistributed or otherwise used for commercial purposes  All illustrations and images included in CareNotes® are the copyrighted property of A D A [employer]  or Augie Vidal  The above information is an  only  It is not intended as medical advice for individual conditions or treatments  Talk to your doctor, nurse or pharmacist before following any medical regimen to see if it is safe and effective for you

## 2019-10-29 NOTE — INTERVAL H&P NOTE
H&P reviewed  After examining the patient I find no changes in the patients condition since the H&P had been written      Vitals:    10/29/19 0706   BP: 133/76   Pulse: 58   Resp: 20   Temp: 97 8 °F (36 6 °C)   SpO2: 96%

## 2019-11-12 ENCOUNTER — PROCEDURE VISIT (OUTPATIENT)
Dept: DERMATOLOGY | Facility: CLINIC | Age: 59
End: 2019-11-12
Payer: COMMERCIAL

## 2019-11-12 DIAGNOSIS — L72.9 FOLLICULAR CYST OF SKIN: Primary | ICD-10-CM

## 2019-11-12 PROCEDURE — 88304 TISSUE EXAM BY PATHOLOGIST: CPT | Performed by: STUDENT IN AN ORGANIZED HEALTH CARE EDUCATION/TRAINING PROGRAM

## 2019-11-12 PROCEDURE — 11403 EXC TR-EXT B9+MARG 2.1-3CM: CPT | Performed by: DERMATOLOGY

## 2019-11-12 PROCEDURE — 12032 INTMD RPR S/A/T/EXT 2.6-7.5: CPT | Performed by: DERMATOLOGY

## 2019-11-12 NOTE — PROGRESS NOTES
[address] [facility] DERMATOLOGY  [address] [place] [address]  [phone]  [phone]     MRN: [id number] : 1960  Encounter: [id number]  Patient Information: Jackson Roy    Subjective:     39-year-old male presents for previously noted cyst on the back and planned excision     Objective: There were no vitals taken for this visit  Physical Exam:    General Appearance:    Alert, cooperative, no distress   Skin:   3 0 cm cystic nodule noted on the mid back  Procedure name:  Simple Excision with primary closure of cyst        Location:  Mid upper back    Diagnosis:  Follicular cyst    Size of lesion: 2 3 cm      I explained the diagnosis to the patient and we recommend an excision of the lesion for diagnosis and/or treatment  Potential complications include, but are not limited to: scarring, bleeding, infection, incomplete removal, recurrence, and nerve damage  The risks, benefits, and alternatives were discussed with the patient in detail  The location of the tumor was identified, circled, and confirmed by the patient  The correct patient, site, and procedure were confirmed  Anesthesia: 1% xylocaine with 1:100,000 epinephrine 6 cc  The patient was brought into the room, prepped and draped sterilely in the usual manner and anesthesia was administered by local infiltration  A linear incision was drawn across the lesion, and the area was incised to the cyst wall with a number 15c Bard-Christiano blade  The cyst was removed with sharp and blunt dissection  Hemostasis was achieved with cautery  A primary closure was obtained The wound was cleaned with hydrogen peroxide, dried off, petroleum applied, and the wound was covered with a pressure dressing  The patient was given detailed verbal and written instructions on postoperative care        Suture for adipose/fascial/dermal layer closure: 4-0  Vicryl 3 sutures    Suture used to approximate epidermis: 4-0  Nylon  4suture    Final wound length: 4 0 cm    Follow-up in: 2 weeks  Assessment:     1  Follicular cyst of skin           Plan:   Wound care instructions given to patient        Prior to Admission medications    Medication Sig Start Date End Date Taking?  Authorizing Provider   amLODIPine (NORVASC) 5 mg tablet Take 1 tablet (5 mg total) by mouth daily 8/6/19  Yes Roman Waldrop MD   aspirin (ECOTRIN LOW STRENGTH) 81 mg EC tablet Take 81 mg by mouth daily   Yes Historical Provider, MD   atorvastatin (LIPITOR) 20 mg tablet Take 1 tablet (20 mg total) by mouth daily 3/26/19  Yes Roman Waldrop MD   cholecalciferol (VITAMIN D3) 1,000 units tablet Take 1 tablet (1,000 Units total) by mouth daily 3/6/19  Yes Roman Waldrop MD   glucose blood test strip 1 each by Other route 4 (four) times daily (after meals and at bedtime) Test once daily or as directed 3/19/19  Yes Kalpesh Sparrow PA-C   Lancets [facility] ULTRASOFT) lancets Test once daily or as directed 3/18/19  Yes Roman Waldrop MD   lisinopril (ZESTRIL) 40 mg tablet Take 1 tablet (40 mg total) by mouth daily 9/30/19  Yes Roman Waldrop MD   metFORMIN (GLUCOPHAGE-XR) 500 mg 24 hr tablet 4 TABLETS DAILY 3/26/19  Yes Roman Waldrop MD   metoprolol succinate (TOPROL-XL) 25 mg 24 hr tablet Take 1-1/2 tablets daily at bedtime 10/7/19  Yes Roman Waldrop MD   montelukast (SINGULAIR) 10 mg tablet Take 1 tablet (10 mg total) by mouth daily at bedtime 9/30/19  Yes Roman Waldrop MD   ycjif-7-tepa ethyl esters (LOVAZA) 1 g capsule Take 2 capsules (2 g total) by mouth 2 (two) times a day 4/22/19  Yes Roman Waldrop MD   sildenafil (REVATIO) 20 mg tablet Take 2 tablets 1 hour before intercourse 7/15/19  Yes Roman Waldrop MD     No Known Allergies    Bernadette Cano MD  11/12/2019,2:41 PM    Portions of the record may have been created with voice recognition software   Occasional wrong word or "sound a like" substitutions may have occurred due to the inherent limitations of voice recognition software   Read the chart carefully and recognize, using context, where substitutions have occurred

## 2019-11-19 DIAGNOSIS — I10 ESSENTIAL HYPERTENSION: ICD-10-CM

## 2019-11-19 RX ORDER — METOPROLOL SUCCINATE 25 MG/1
37.5 TABLET, EXTENDED RELEASE ORAL DAILY
Qty: 135 TABLET | Refills: 1 | Status: SHIPPED | OUTPATIENT
Start: 2019-11-19 | End: 2019-11-22 | Stop reason: SDUPTHER

## 2019-11-22 ENCOUNTER — OFFICE VISIT (OUTPATIENT)
Dept: INTERNAL MEDICINE CLINIC | Facility: CLINIC | Age: 59
End: 2019-11-22
Payer: COMMERCIAL

## 2019-11-22 VITALS
SYSTOLIC BLOOD PRESSURE: 132 MMHG | HEIGHT: 69 IN | BODY MASS INDEX: 32.44 KG/M2 | WEIGHT: 219 LBS | OXYGEN SATURATION: 97 % | HEART RATE: 61 BPM | DIASTOLIC BLOOD PRESSURE: 75 MMHG

## 2019-11-22 DIAGNOSIS — I10 ESSENTIAL HYPERTENSION: ICD-10-CM

## 2019-11-22 PROCEDURE — 99213 OFFICE O/P EST LOW 20 MIN: CPT | Performed by: INTERNAL MEDICINE

## 2019-11-22 PROCEDURE — 1036F TOBACCO NON-USER: CPT | Performed by: INTERNAL MEDICINE

## 2019-11-22 RX ORDER — METOPROLOL SUCCINATE 25 MG/1
37.5 TABLET, EXTENDED RELEASE ORAL DAILY
Qty: 45 TABLET | Refills: 11 | Status: SHIPPED | OUTPATIENT
Start: 2019-11-22 | End: 2020-12-04

## 2019-11-22 NOTE — PROGRESS NOTES
Assessment/Plan:       Diagnoses and all orders for this visit:    Essential hypertension  -     metoprolol succinate (TOPROL-XL) 25 mg 24 hr tablet; Take 1 5 tablets (37 5 mg total) by mouth daily Take 1-1/2 tablets daily at bedtime                Subjective:      Patient ID: Nohemy Rubio is a 60 y o  male  Seen 6 weeks ago  Blood pressure noted to be high  Advice was to increase metoprolol to 1-1/2 tablets daily  Also advised to reduce sodium in diet  Comes in for follow-up today  Tolerating this dose well  Total dose 37 5 milligrams  Blood pressure is 131/75  Pulse is in the 60s  This has not caused a problem with his asthma     colonoscopy several weeks ago polyps noted  The following portions of the patient's history were reviewed and updated as appropriate:   He has a past medical history of Asthma, Chronic kidney disease, Diabetes mellitus ([address] ), Hypertension, and Kidney stone  ,  does not have any pertinent problems on file  ,   has a past surgical history that includes Shoulder surgery (2015); Cholecystectomy; and Rotator cuff repair  ,  family history includes Cancer in his father; Diabetes in his mother; Hypertension in his brother, father, and mother  ,   reports that he has never smoked  He has never used smokeless tobacco  He reports that he drinks alcohol  He reports that he does not use drugs  ,  has No Known Allergies     Current Outpatient Medications   Medication Sig Dispense Refill    amLODIPine (NORVASC) 5 mg tablet Take 1 tablet (5 mg total) by mouth daily 90 tablet 3    aspirin (ECOTRIN LOW STRENGTH) 81 mg EC tablet Take 81 mg by mouth daily      atorvastatin (LIPITOR) 20 mg tablet Take 1 tablet (20 mg total) by mouth daily 90 tablet 3    glucose blood test strip 1 each by Other route 4 (four) times daily (after meals and at bedtime) Test once daily or as directed 100 each 2    Lancets (ONETOUCH ULTRASOFT) lancets Test once daily or as directed 100 each 3    lisinopril (ZESTRIL) 40 mg tablet Take 1 tablet (40 mg total) by mouth daily 90 tablet 1    metFORMIN (GLUCOPHAGE-XR) 500 mg 24 hr tablet 4 TABLETS DAILY 360 tablet 3    metoprolol succinate (TOPROL-XL) 25 mg 24 hr tablet Take 1 5 tablets (37 5 mg total) by mouth daily Take 1-1/2 tablets daily at bedtime 45 tablet 11    montelukast (SINGULAIR) 10 mg tablet Take 1 tablet (10 mg total) by mouth daily at bedtime 90 tablet 1    omega-3-acid ethyl esters (LOVAZA) 1 g capsule Take 2 capsules (2 g total) by mouth 2 (two) times a day 360 capsule 1    sildenafil (REVATIO) 20 mg tablet Take 2 tablets 1 hour before intercourse 30 tablet 0    cholecalciferol (VITAMIN D3) 1,000 units tablet Take 1 tablet (1,000 Units total) by mouth daily (Patient not taking: Reported on 11/22/2019) 90 tablet 1     No current facility-administered medications for this visit  Review of Systems      Objective:  Vitals:    11/22/19 1420   BP: 132/75   Pulse:    SpO2:       Physical Exam      Patient Instructions    Blood pressure control has been achieved and a relatively modest cost of a slight increase of metoprolol dose  Continue this dose and see me for the next scheduled visit  Call if problems develop

## 2019-11-22 NOTE — PATIENT INSTRUCTIONS
Blood pressure control has been achieved and a relatively modest cost of a slight increase of metoprolol dose  Continue this dose and see me for the next scheduled visit  Call if problems develop

## 2019-11-25 ENCOUNTER — TELEPHONE (OUTPATIENT)
Dept: DERMATOLOGY | Facility: CLINIC | Age: 59
End: 2019-11-25

## 2019-11-25 NOTE — TELEPHONE ENCOUNTER
----- Message from Bandar Franklin MD sent at 11/24/2019  5:02 PM EST -----  Please call him of normal pathology

## 2019-11-26 ENCOUNTER — CLINICAL SUPPORT (OUTPATIENT)
Dept: DERMATOLOGY | Facility: CLINIC | Age: 59
End: 2019-11-26

## 2019-11-26 DIAGNOSIS — L72.9 FOLLICULAR CYST OF SKIN: Primary | ICD-10-CM

## 2019-11-26 PROCEDURE — 99024 POSTOP FOLLOW-UP VISIT: CPT | Performed by: DERMATOLOGY

## 2019-11-26 NOTE — PATIENT INSTRUCTIONS
Wound care instructions given to patient  STERI-STRIPS (BUTTERFLY) POST SURGERY        Steri-Strips have been placed over your incision site to give added support  Please continue to bathe the area as usual     Eventually the Steri-Strips will begin to peel off on the ends  Snip the raised ends off with scissors and let the rest fall off on their own  If you have any questions, please call our office   [phone]

## 2019-11-26 NOTE — PROGRESS NOTES
[facility] DERMATOLOGY  [address] [place] [id number]  [phone]  [phone]     MRN: [id number] : 1960  Encounter: [id number]  Patient Information: Sneha Fowler    Subjective:     50-year-old male presents for follow-up for previously excised follicular cyst     Objective: There were no vitals taken for this visit  Physical Exam:    General Appearance:    Alert, cooperative, no distress   Skin:   Previous site excision healing well  Procedure:  Suture removal  Site of excision:  Mid upper back  Wound was cleansed with saline  Wound is intact  Sutures removed  Steri-Strips applied  Biopsy revealed normal cyst       Assessment:     1  Follicular cyst of skin           Plan:   Wound care instructions given to patient        Prior to Admission medications    Medication Sig Start Date End Date Taking?  Authorizing Provider   amLODIPine (NORVASC) 5 mg tablet Take 1 tablet (5 mg total) by mouth daily 19  Yes Moe South MD   aspirin (ECOTRIN LOW STRENGTH) 81 mg EC tablet Take 81 mg by mouth daily   Yes Historical Provider, MD   atorvastatin (LIPITOR) 20 mg tablet Take 1 tablet (20 mg total) by mouth daily 3/26/19  Yes Moe South MD   glucose blood test strip 1 each by Other route 4 (four) times daily (after meals and at bedtime) Test once daily or as directed 3/19/19  Yes Nathan Greer PA-C   Lancets [facility] ULTRASOFT) lancets Test once daily or as directed 3/18/19  Yes Moe South MD   lisinopril (ZESTRIL) 40 mg tablet Take 1 tablet (40 mg total) by mouth daily 19  Yes Moe South MD   metFORMIN (GLUCOPHAGE-XR) 500 mg 24 hr tablet 4 TABLETS DAILY 3/26/19  Yes Moe South MD   metoprolol succinate (TOPROL-XL) 25 mg 24 hr tablet Take 1 5 tablets (37 5 mg total) by mouth daily Take 1-1/2 tablets daily at bedtime 19  Yes Moe South MD   montelukast (SINGULAIR) 10 mg tablet Take 1 tablet (10 mg total) by mouth daily at bedtime 9/30/19  Yes Lizzeth Artis MD   flwer-3-pbto ethyl esters (LOVAZA) 1 g capsule Take 2 capsules (2 g total) by mouth 2 (two) times a day 4/22/19  Yes Lizzeth Artis MD   sildenafil (REVATIO) 20 mg tablet Take 2 tablets 1 hour before intercourse 7/15/19  Yes Lizzeth Artis MD   cholecalciferol (VITAMIN D3) 1,000 units tablet Take 1 tablet (1,000 Units total) by mouth daily  Patient not taking: Reported on 11/22/2019 3/6/19   Lizzeth Artis MD     No Known Allergies    Nikolai Mir MD  11/26/2019,2:58 PM    Portions of the record may have been created with voice recognition software   Occasional wrong word or "sound a like" substitutions may have occurred due to the inherent limitations of voice recognition software   Read the chart carefully and recognize, using context, where substitutions have occurred

## 2020-01-23 ENCOUNTER — OFFICE VISIT (OUTPATIENT)
Dept: INTERNAL MEDICINE CLINIC | Facility: CLINIC | Age: 60
End: 2020-01-23
Payer: COMMERCIAL

## 2020-01-23 VITALS
HEIGHT: 69 IN | DIASTOLIC BLOOD PRESSURE: 78 MMHG | OXYGEN SATURATION: 95 % | HEART RATE: 65 BPM | WEIGHT: 218 LBS | BODY MASS INDEX: 32.29 KG/M2 | SYSTOLIC BLOOD PRESSURE: 130 MMHG

## 2020-01-23 DIAGNOSIS — Z11.59 ENCOUNTER FOR HEPATITIS C SCREENING TEST FOR LOW RISK PATIENT: ICD-10-CM

## 2020-01-23 DIAGNOSIS — E11.69 TYPE 2 DIABETES MELLITUS WITH OTHER SPECIFIED COMPLICATION, WITHOUT LONG-TERM CURRENT USE OF INSULIN (HCC): Primary | ICD-10-CM

## 2020-01-23 DIAGNOSIS — Z11.4 ENCOUNTER FOR SCREENING FOR HIV: ICD-10-CM

## 2020-01-23 DIAGNOSIS — I10 ESSENTIAL HYPERTENSION: ICD-10-CM

## 2020-01-23 DIAGNOSIS — I11.9 HYPERTENSIVE HEART DISEASE WITHOUT HEART FAILURE: ICD-10-CM

## 2020-01-23 DIAGNOSIS — Z12.5 PROSTATE CANCER SCREENING: ICD-10-CM

## 2020-01-23 DIAGNOSIS — E78.2 MIXED HYPERLIPIDEMIA: ICD-10-CM

## 2020-01-23 DIAGNOSIS — E55.9 VITAMIN D DEFICIENCY: ICD-10-CM

## 2020-01-23 PROCEDURE — 1036F TOBACCO NON-USER: CPT | Performed by: INTERNAL MEDICINE

## 2020-01-23 PROCEDURE — 3075F SYST BP GE 130 - 139MM HG: CPT | Performed by: INTERNAL MEDICINE

## 2020-01-23 PROCEDURE — 3008F BODY MASS INDEX DOCD: CPT | Performed by: INTERNAL MEDICINE

## 2020-01-23 PROCEDURE — 3078F DIAST BP <80 MM HG: CPT | Performed by: INTERNAL MEDICINE

## 2020-01-23 PROCEDURE — 99213 OFFICE O/P EST LOW 20 MIN: CPT | Performed by: INTERNAL MEDICINE

## 2020-01-23 NOTE — PATIENT INSTRUCTIONS
The patient feels well  Blood pressure is good  We need to recheck his laboratory panels  8 month follow-up

## 2020-01-23 NOTE — PROGRESS NOTES
BMI Counseling: Body mass index is 32 19 kg/m²  The BMI is above normal  Nutrition recommendations include decreasing portion sizes  Exercise recommendations include moderate physical activity 150 minutes/week  Assessment/Plan:       Diagnoses and all orders for this visit:    Type 2 diabetes mellitus with other specified complication, without long-term current use of insulin (HCC)  -     CBC and differential; Future  -     Lipid Panel with Direct LDL reflex; Future  -     Comprehensive metabolic panel; Future  -     TSH, 3rd generation with Free T4 reflex; Future  -     UA (URINE) with reflex to Scope  -     Microalbumin / creatinine urine ratio  -     Hemoglobin A1C; Future    Essential hypertension  -     CBC and differential; Future  -     Lipid Panel with Direct LDL reflex; Future  -     Comprehensive metabolic panel; Future  -     TSH, 3rd generation with Free T4 reflex; Future  -     UA (URINE) with reflex to Scope  -     Microalbumin / creatinine urine ratio  -     Hemoglobin A1C; Future    Hypertensive heart disease without heart failure  -     CBC and differential; Future  -     Lipid Panel with Direct LDL reflex; Future  -     Comprehensive metabolic panel; Future  -     TSH, 3rd generation with Free T4 reflex; Future  -     UA (URINE) with reflex to Scope  -     Microalbumin / creatinine urine ratio  -     Hemoglobin A1C; Future    Mixed hyperlipidemia    Prostate cancer screening    Vitamin D deficiency  -     Vitamin D 25 hydroxy; Future    Encounter for screening for HIV  -     HIV 1/2 AG-AB combo; Future    Encounter for hepatitis C screening test for low risk patient  -     Hepatitis C antibody; Future                Subjective:      Patient ID: Flaco Silvestre is a 60 y o  male      Follow-up visit of a 68-year-old male who feels well  Hypertension, diabetes, and dyslipidemia are treated  Colonoscopy done in late October 2019   3 tubular adenomas were noted and he will need repeat in 2022     No complaints today       hypercalcemia had been noted in the past but a recent reassessment was fine  Likewise, vitamin-D deficiency have been noted in the past but he has been on supplement and the last time around it and normalized  These issues can be reviewed today  The following portions of the patient's history were reviewed and updated as appropriate:   He has a past medical history of Asthma, Chronic kidney disease, Diabetes mellitus ([address] ), Hypertension, and Kidney stone  ,  does not have any pertinent problems on file  ,   has a past surgical history that includes Shoulder surgery (2015); Cholecystectomy; and Rotator cuff repair  ,  family history includes Cancer in his father; Diabetes in his mother; Hypertension in his brother, father, and mother  ,   reports that he has never smoked  He has never used smokeless tobacco  He reports that he drinks alcohol  He reports that he does not use drugs  ,  has No Known Allergies     Current Outpatient Medications   Medication Sig Dispense Refill    amLODIPine (NORVASC) 5 mg tablet Take 1 tablet (5 mg total) by mouth daily 90 tablet 3    aspirin (ECOTRIN LOW STRENGTH) 81 mg EC tablet Take 81 mg by mouth daily      atorvastatin (LIPITOR) 20 mg tablet Take 1 tablet (20 mg total) by mouth daily 90 tablet 3    glucose blood test strip 1 each by Other route 4 (four) times daily (after meals and at bedtime) Test once daily or as directed 100 each 2    Lancets (ONETOUCH ULTRASOFT) lancets Test once daily or as directed 100 each 3    lisinopril (ZESTRIL) 40 mg tablet Take 1 tablet (40 mg total) by mouth daily 90 tablet 1    metFORMIN (GLUCOPHAGE-XR) 500 mg 24 hr tablet 4 TABLETS DAILY 360 tablet 3    metoprolol succinate (TOPROL-XL) 25 mg 24 hr tablet Take 1 5 tablets (37 5 mg total) by mouth daily Take 1-1/2 tablets daily at bedtime 45 tablet 11    montelukast (SINGULAIR) 10 mg tablet Take 1 tablet (10 mg total) by mouth daily at bedtime 90 tablet 1  omega-3-acid ethyl esters (LOVAZA) 1 g capsule Take 2 capsules (2 g total) by mouth 2 (two) times a day 360 capsule 1    sildenafil (REVATIO) 20 mg tablet Take 2 tablets 1 hour before intercourse 30 tablet 0    cholecalciferol (VITAMIN D3) 1,000 units tablet Take 1 tablet (1,000 Units total) by mouth daily (Patient not taking: Reported on 11/22/2019) 90 tablet 1     No current facility-administered medications for this visit  Review of Systems   Constitutional: Negative for fatigue  Respiratory: Negative for cough, chest tightness and shortness of breath  Cardiovascular: Negative for chest pain and leg swelling  Musculoskeletal: Positive for arthralgias  Neurological: Negative for headaches  Objective:  Vitals:    01/23/20 1429   BP: 130/78   Pulse: 65   SpO2: 95%      Physical Exam   Constitutional: He is oriented to person, place, and time  He appears well-developed and well-nourished  Cardiovascular: Normal rate  Pulmonary/Chest: Effort normal    Neurological: He is alert and oriented to person, place, and time  Patient Instructions     The patient feels well  Blood pressure is good  We need to recheck his laboratory panels  8 month follow-up

## 2020-01-31 LAB
EXTERNAL HIV SCREEN: NORMAL
HBA1C MFR BLD HPLC: 7.1 %
HCV AB SER-ACNC: <0.1

## 2020-02-01 LAB
25(OH)D3+25(OH)D2 SERPL-MCNC: 24 NG/ML (ref 30–100)
ALBUMIN SERPL-MCNC: 4.2 G/DL (ref 3.8–4.9)
ALBUMIN/GLOB SERPL: 2.1 {RATIO} (ref 1.2–2.2)
ALP SERPL-CCNC: 46 IU/L (ref 39–117)
ALT SERPL-CCNC: 12 IU/L (ref 0–44)
AST SERPL-CCNC: 15 IU/L (ref 0–40)
BASOPHILS # BLD AUTO: 0 X10E3/UL (ref 0–0.2)
BASOPHILS NFR BLD AUTO: 0 %
BILIRUB SERPL-MCNC: <0.2 MG/DL (ref 0–1.2)
BUN SERPL-MCNC: 16 MG/DL (ref 8–27)
BUN/CREAT SERPL: 13 (ref 10–24)
CALCIUM SERPL-MCNC: 9 MG/DL (ref 8.6–10.2)
CHLORIDE SERPL-SCNC: 102 MMOL/L (ref 96–106)
CHOLEST SERPL-MCNC: 102 MG/DL (ref 100–199)
CO2 SERPL-SCNC: 24 MMOL/L (ref 20–29)
CREAT SERPL-MCNC: 1.25 MG/DL (ref 0.76–1.27)
EOSINOPHIL # BLD AUTO: 0.6 X10E3/UL (ref 0–0.4)
EOSINOPHIL NFR BLD AUTO: 8 %
ERYTHROCYTE [DISTWIDTH] IN BLOOD BY AUTOMATED COUNT: 13.2 % (ref 11.6–15.4)
GLOBULIN SER-MCNC: 2 G/DL (ref 1.5–4.5)
GLUCOSE SERPL-MCNC: 117 MG/DL (ref 65–99)
HBA1C MFR BLD: 7.1 % (ref 4.8–5.6)
HCT VFR BLD AUTO: 39.9 % (ref 37.5–51)
HCV AB S/CO SERPL IA: <0.1 S/CO RATIO (ref 0–0.9)
HDLC SERPL-MCNC: 32 MG/DL
HGB BLD-MCNC: 13.3 G/DL (ref 13–17.7)
HIV 1+2 AB+HIV1 P24 AG SERPL QL IA: NON REACTIVE
IMM GRANULOCYTES # BLD: 0 X10E3/UL (ref 0–0.1)
IMM GRANULOCYTES NFR BLD: 0 %
LDLC SERPL CALC-MCNC: 43 MG/DL (ref 0–99)
LDLC SERPL DIRECT ASSAY-MCNC: 47 MG/DL (ref 0–99)
LYMPHOCYTES # BLD AUTO: 3.1 X10E3/UL (ref 0.7–3.1)
LYMPHOCYTES NFR BLD AUTO: 39 %
MCH RBC QN AUTO: 30.1 PG (ref 26.6–33)
MCHC RBC AUTO-ENTMCNC: 33.3 G/DL (ref 31.5–35.7)
MCV RBC AUTO: 90 FL (ref 79–97)
MONOCYTES # BLD AUTO: 0.5 X10E3/UL (ref 0.1–0.9)
MONOCYTES NFR BLD AUTO: 6 %
NEUTROPHILS # BLD AUTO: 3.8 X10E3/UL (ref 1.4–7)
NEUTROPHILS NFR BLD AUTO: 47 %
PLATELET # BLD AUTO: 284 X10E3/UL (ref 150–450)
POTASSIUM SERPL-SCNC: 4.5 MMOL/L (ref 3.5–5.2)
PROT SERPL-MCNC: 6.2 G/DL (ref 6–8.5)
RBC # BLD AUTO: 4.42 X10E6/UL (ref 4.14–5.8)
SL AMB EGFR AFRICAN AMERICAN: 72 ML/MIN/1.73
SL AMB EGFR NON AFRICAN AMERICAN: 62 ML/MIN/1.73
SL AMB INTERPRETATION: NORMAL
SL AMB VLDL CHOLESTEROL CALC: 27 MG/DL (ref 5–40)
SODIUM SERPL-SCNC: 140 MMOL/L (ref 134–144)
TRIGL SERPL-MCNC: 134 MG/DL (ref 0–149)
TSH SERPL DL<=0.005 MIU/L-ACNC: 2.86 UIU/ML (ref 0.45–4.5)
WBC # BLD AUTO: 8 X10E3/UL (ref 3.4–10.8)

## 2020-02-01 PROCEDURE — 3051F HG A1C>EQUAL 7.0%<8.0%: CPT | Performed by: PHYSICIAN ASSISTANT

## 2020-02-03 ENCOUNTER — TELEPHONE (OUTPATIENT)
Dept: INTERNAL MEDICINE CLINIC | Facility: CLINIC | Age: 60
End: 2020-02-03

## 2020-02-03 NOTE — TELEPHONE ENCOUNTER
----- Message from J Luis Ramirez MD sent at 2/3/2020  7:46 AM EST -----  Please call the patient regarding his  Result  Hemoglobin A1c of 7 1% is not terrible but higher than desirable    He should really reduce carbohydrate and embark on some kind of aerobics program   This needs a recheck in 4 months

## 2020-02-04 NOTE — TELEPHONE ENCOUNTER
That was the only abnormality  The vitamin-D was a bit low but nothing alarming    PSA was not done because it will not be paid for until March; that can be done the next time around

## 2020-02-05 ENCOUNTER — TELEPHONE (OUTPATIENT)
Dept: INTERNAL MEDICINE CLINIC | Facility: CLINIC | Age: 60
End: 2020-02-05

## 2020-02-05 NOTE — TELEPHONE ENCOUNTER
----- Message from Tiarra Ulloa MD sent at 2/4/2020  7:33 PM EST -----  Please call the patient regarding his  Result  Hemoglobin A1c of 7 1% is a little higher than before  Nothing disastrous but he should be aware of this  He should reduce carbohydrate and try to increase his activities

## 2020-03-06 DIAGNOSIS — E11.69 TYPE 2 DIABETES MELLITUS WITH OTHER SPECIFIED COMPLICATION, WITHOUT LONG-TERM CURRENT USE OF INSULIN (HCC): ICD-10-CM

## 2020-03-06 DIAGNOSIS — I10 ESSENTIAL HYPERTENSION: ICD-10-CM

## 2020-03-06 DIAGNOSIS — E78.2 MIXED HYPERLIPIDEMIA: ICD-10-CM

## 2020-03-06 RX ORDER — ATORVASTATIN CALCIUM 20 MG/1
20 TABLET, FILM COATED ORAL DAILY
Qty: 90 TABLET | Refills: 3 | Status: SHIPPED | OUTPATIENT
Start: 2020-03-06 | End: 2021-03-03 | Stop reason: SDUPTHER

## 2020-03-06 RX ORDER — SILDENAFIL CITRATE 20 MG/1
TABLET ORAL
Qty: 30 TABLET | Refills: 0 | Status: SHIPPED | OUTPATIENT
Start: 2020-03-06 | End: 2020-11-17

## 2020-03-06 NOTE — TELEPHONE ENCOUNTER
Refill request     Sildenafil 20mg   2 tablets 1 hour before intercourse     Atorvastatin 20mg   1 tablet daily     Send to [employer]

## 2020-03-09 ENCOUNTER — TELEPHONE (OUTPATIENT)
Dept: INTERNAL MEDICINE CLINIC | Facility: CLINIC | Age: 60
End: 2020-03-09

## 2020-03-11 ENCOUNTER — TELEPHONE (OUTPATIENT)
Dept: INTERNAL MEDICINE CLINIC | Facility: CLINIC | Age: 60
End: 2020-03-11

## 2020-03-12 NOTE — TELEPHONE ENCOUNTER
Spoke to patient and he picked up his Rx    Patient states that it was covered by insurance he is not aware why they sent me a Prior auth request

## 2020-04-05 DIAGNOSIS — I10 ESSENTIAL HYPERTENSION: ICD-10-CM

## 2020-04-05 DIAGNOSIS — J45.20 MILD INTERMITTENT ASTHMA WITHOUT COMPLICATION: ICD-10-CM

## 2020-04-06 RX ORDER — LISINOPRIL 40 MG/1
40 TABLET ORAL DAILY
Qty: 90 TABLET | Refills: 0 | Status: SHIPPED | OUTPATIENT
Start: 2020-04-06 | End: 2020-07-06 | Stop reason: SDUPTHER

## 2020-04-06 RX ORDER — MONTELUKAST SODIUM 10 MG/1
10 TABLET ORAL
Qty: 90 TABLET | Refills: 0 | Status: SHIPPED | OUTPATIENT
Start: 2020-04-06 | End: 2020-07-09 | Stop reason: SDUPTHER

## 2020-04-09 DIAGNOSIS — E11.69 TYPE 2 DIABETES MELLITUS WITH OTHER SPECIFIED COMPLICATION, WITHOUT LONG-TERM CURRENT USE OF INSULIN (HCC): ICD-10-CM

## 2020-04-09 RX ORDER — OMEGA-3-ACID ETHYL ESTERS 1 G/1
2 CAPSULE, LIQUID FILLED ORAL 2 TIMES DAILY
Qty: 360 CAPSULE | Refills: 1 | Status: SHIPPED | OUTPATIENT
Start: 2020-04-09 | End: 2021-04-22 | Stop reason: SDUPTHER

## 2020-04-09 RX ORDER — METFORMIN HYDROCHLORIDE 500 MG/1
TABLET, EXTENDED RELEASE ORAL
Qty: 360 TABLET | Refills: 3 | Status: SHIPPED | OUTPATIENT
Start: 2020-04-09 | End: 2021-03-03 | Stop reason: SDUPTHER

## 2020-06-02 ENCOUNTER — OFFICE VISIT (OUTPATIENT)
Dept: CARDIOLOGY CLINIC | Facility: CLINIC | Age: 60
End: 2020-06-02
Payer: COMMERCIAL

## 2020-06-02 VITALS
BODY MASS INDEX: 32.14 KG/M2 | SYSTOLIC BLOOD PRESSURE: 116 MMHG | HEART RATE: 52 BPM | DIASTOLIC BLOOD PRESSURE: 76 MMHG | HEIGHT: 69 IN | WEIGHT: 217 LBS

## 2020-06-02 DIAGNOSIS — E78.2 MIXED HYPERLIPIDEMIA: Primary | ICD-10-CM

## 2020-06-02 DIAGNOSIS — E11.69 TYPE 2 DIABETES MELLITUS WITH OTHER SPECIFIED COMPLICATION, WITHOUT LONG-TERM CURRENT USE OF INSULIN (HCC): ICD-10-CM

## 2020-06-02 DIAGNOSIS — Z82.49 FAMILY HISTORY OF EARLY CAD: ICD-10-CM

## 2020-06-02 DIAGNOSIS — I11.9 HYPERTENSIVE HEART DISEASE WITHOUT HEART FAILURE: ICD-10-CM

## 2020-06-02 DIAGNOSIS — R07.89 CHEST PRESSURE: ICD-10-CM

## 2020-06-02 DIAGNOSIS — I73.9 CLAUDICATION (HCC): ICD-10-CM

## 2020-06-02 PROCEDURE — 3078F DIAST BP <80 MM HG: CPT | Performed by: PHYSICIAN ASSISTANT

## 2020-06-02 PROCEDURE — 2022F DILAT RTA XM EVC RTNOPTHY: CPT | Performed by: PHYSICIAN ASSISTANT

## 2020-06-02 PROCEDURE — 3074F SYST BP LT 130 MM HG: CPT | Performed by: PHYSICIAN ASSISTANT

## 2020-06-02 PROCEDURE — 3008F BODY MASS INDEX DOCD: CPT | Performed by: PHYSICIAN ASSISTANT

## 2020-06-02 PROCEDURE — 99214 OFFICE O/P EST MOD 30 MIN: CPT | Performed by: PHYSICIAN ASSISTANT

## 2020-06-02 PROCEDURE — 3051F HG A1C>EQUAL 7.0%<8.0%: CPT | Performed by: PHYSICIAN ASSISTANT

## 2020-06-02 PROCEDURE — 1036F TOBACCO NON-USER: CPT | Performed by: PHYSICIAN ASSISTANT

## 2020-06-10 ENCOUNTER — HOSPITAL ENCOUNTER (OUTPATIENT)
Dept: NON INVASIVE DIAGNOSTICS | Facility: CLINIC | Age: 60
Discharge: HOME | End: 2020-06-10
Payer: COMMERCIAL

## 2020-06-10 DIAGNOSIS — I73.9 CLAUDICATION (HCC): ICD-10-CM

## 2020-06-10 PROCEDURE — 93922 UPR/L XTREMITY ART 2 LEVELS: CPT | Performed by: SURGERY

## 2020-06-10 PROCEDURE — 93930 UPPER EXTREMITY STUDY: CPT | Performed by: SURGERY

## 2020-06-10 PROCEDURE — 93923 UPR/LXTR ART STDY 3+ LVLS: CPT

## 2020-06-10 PROCEDURE — 93930 UPPER EXTREMITY STUDY: CPT

## 2020-06-10 PROCEDURE — 93925 LOWER EXTREMITY STUDY: CPT | Performed by: SURGERY

## 2020-06-10 PROCEDURE — 93925 LOWER EXTREMITY STUDY: CPT

## 2020-06-23 ENCOUNTER — TELEPHONE (OUTPATIENT)
Dept: INTERNAL MEDICINE CLINIC | Facility: CLINIC | Age: 60
End: 2020-06-23

## 2020-07-06 DIAGNOSIS — I10 ESSENTIAL HYPERTENSION: ICD-10-CM

## 2020-07-06 PROCEDURE — 4010F ACE/ARB THERAPY RXD/TAKEN: CPT | Performed by: PHYSICIAN ASSISTANT

## 2020-07-06 RX ORDER — LISINOPRIL 40 MG/1
40 TABLET ORAL DAILY
Qty: 90 TABLET | Refills: 3 | Status: SHIPPED | OUTPATIENT
Start: 2020-07-06 | End: 2021-07-01 | Stop reason: SDUPTHER

## 2020-07-06 NOTE — TELEPHONE ENCOUNTER
Progress Notes by Sis Coates MD at 10/09/17 10:36 AM     Author:  Sis Coates MD Service:  (none) Author Type:  Physician     Filed:  10/09/17 11:30 AM Encounter Date:  10/9/2017 Status:  Signed     :  Sis Coates MD (Physician)              15 Month Well Child Visit   Roomed by: Tyson Walker MA 10:36 AM     Accompanied by:[ER1.1T] Mother and Grandmother[ER1.1M]  DEVELOPMENTAL LANDMARKS:   Feeds Self?[ER1.1T] Yes[ER1.1M]  5 words?[ER1.1T] Yes[ER1.1M]      Stands alone?[ER1.1T] Yes[ER1.1M]  Walks well?[ER1.1T] Yes[ER1.1M]  Walks backward?[ER1.1T] Yes[ER1.1M]  Walks up stairs with hand held?[ER1.1T] Yes[ER1.1M]  Total number of \"No\" responses to developmental landmark questions:[ER1.1T] 0[SS1.1M]    2nd Hand Smoke  Is there someone in Bo's home that smokes:[ER1.1T] No[ER1.1M]    SUBJECTIVE   Present health:[ER1.1T] Good[ER1.1M]  Diet:[ER1.1T] solids and whole milk[ER1.1M].    Elimination:    BM s:[ER1.1T] Normal[ER1.1M]  Urine:[ER1.1T] Normal[ER1.1M]  Sleep:[ER1.1T] Normal[ER1.1M]    ROS  All other systems reviewed were negative     Allergies:  Review of patient's allergies indicates no known allergies.    No current outpatient prescriptions on file.        Family history:[ER1.1T] No change in family history[ER1.1M]    Social history:[ER1.1T] Not in  or school[SS1.1M]     OBJECTIVE:  Physical exam[ER1.1T]    Pulse 114, temperature 98.3 °F (36.8 °C), temperature source Temporal, height 2' 4.6\" (0.726 m), weight 19 lb 6 oz (8.788 kg), head circumference 18.2\" (46.2 cm), SpO2 100 %.[ER1.2T]        GENERAL: Evaluation of appearance.  Findings:[ER1.1T] Normal- alert and no distress noted[SS1.1T]    HEAD & SCALP: Evaluation for lesions, swelling, tenderness and abnormalities.  Findings:[ER1.1T] Normal - normocephalic with no lesions present[SS1.1T]    EYES: Evaluation for redness, swelling, drainage and abnormalities.  Findings:[ER1.1T] Both eyes normal - red reflex present, no redness  Refill : lisinopril (ZESTRIL) 40 mg tablet     # [phone]    [facility] # [phone] or drainage, VIRGEN and HETEROCHROMIA[SS1.1M]    EARS: Evaluation of  external ears, canals, and tm's  Findings:[ER1.1T] Normal bilateral ext. ears, canals, and tm's[SS1.1T]    NOSE: Evaluation for swelling and drainage  Findings:[ER1.1T] Normal - patent with no swelling or discharge present[SS1.1T]    MOUTH: Evaluation of tongue and mucosal membranes  Findings:[ER1.1T] Normal[SS1.1T]    THROAT: Evaluation for redness and lesions  Findings:[ER1.1T] Normal[SS1.1T]    NECK: Evaluation for masses, swelling and soreness  Findings:[ER1.1T] Supple, no adenopathy or masses[SS1.1T]    CHEST: Evaluation - auscultation and observation  Findings:[ER1.1T] Normal - clear to auscultation, breath sounds normal, no rhonchi - wheezes - or rales[SS1.1T].   BREAST:[ER1.1T] Normal[SS1.1T]    CARDIO: Evaluation by auscultation   Findings:[ER1.1T] Normal - RRR, normal S1&S2, no murmurs or extra sounds noted[SS1.1T]    ABDOMEN: Evaluation for bowel sounds, organomegaly, masses and tenderness  Findings:[ER1.1T] Normal - soft, no tenderness, no masses, no organomegaly[SS1.1T]    : Evaluation by inspections.  Findings:[ER1.1T] normal female[SS1.1T]    MUS-SKEL: Evaluation for swelling, edema, range of motion or other abnormalities.  Findings:[ER1.1T] Normal, no scoliosis or other abnormalities[SS1.1T].  Hip Exam:[ER1.1T] Normal[SS1.1T]    SKIN: Evaluation for rashes, acne and lesions  Findings:[ER1.1T] Normal[SS1.1T]    NEURO: Evaluation by observation.  Findings:[ER1.1T] Normal[SS1.1T]      ASSESSMENT:[ER1.1T]   Normal Health Maintenance Visit[SS1.1T]    PLAN:  Medication changes:[ER1.1T] No[SS1.1M]  Immunizations given today?[ER1.1T] Yes.  Vaccine counseling including benefits, risks and adverse reactions were provided by myself during the visit.[SS1.1M]  - OUT OF PREVNAR IN OFFICE TODAY, PT TO RETURN FOR RN VISIT[SS1.2M]  See Orders  Reviewed Discussion and Guidance Topics:[ER1.1T] accident prevention: car seat, lead prevention, speech  development, transition to cup, d/c bottle, curiosity, emerging independence and discipline vs punishment[SS1.1M]  Parent instructed to use baby book.    Make Appointment for 18 Month Well Child Checkup  Call if questions or problems.[ER1.1T]    Electronically Signed by:    Sis Coates MD , 10/9/2017[SS1.3T]        Revision History        User Key Date/Time User Provider Type Action    > SS1.2 10/09/17 11:30 AM Sis Coates MD Physician Sign     SS1.3 10/09/17 10:52 AM Sis Coates MD Physician      SS1.1 10/09/17 10:51 AM Sis Coates MD Physician      ER1.2 10/09/17 10:42 AM Tyson Walker MA Medical Assistant Sign at close encounter     ER1.1 10/09/17 10:36 AM Tyson Walker MA Medical Assistant     M - Manual, T - Template

## 2020-07-09 DIAGNOSIS — J45.20 MILD INTERMITTENT ASTHMA WITHOUT COMPLICATION: ICD-10-CM

## 2020-07-09 RX ORDER — MONTELUKAST SODIUM 10 MG/1
10 TABLET ORAL
Qty: 90 TABLET | Refills: 3 | Status: SHIPPED | OUTPATIENT
Start: 2020-07-09 | End: 2021-07-01 | Stop reason: SDUPTHER

## 2020-08-05 ENCOUNTER — TELEPHONE (OUTPATIENT)
Dept: INTERNAL MEDICINE CLINIC | Facility: CLINIC | Age: 60
End: 2020-08-05

## 2020-08-05 DIAGNOSIS — I10 ESSENTIAL HYPERTENSION: ICD-10-CM

## 2020-08-05 NOTE — TELEPHONE ENCOUNTER
Med refill:  amLODIPine (NORVASC) 5 mg tablet    [address] [facility]

## 2020-08-06 RX ORDER — AMLODIPINE BESYLATE 5 MG/1
5 TABLET ORAL DAILY
Qty: 90 TABLET | Refills: 3 | Status: SHIPPED | OUTPATIENT
Start: 2020-08-06 | End: 2021-07-22

## 2020-10-05 ENCOUNTER — OFFICE VISIT (OUTPATIENT)
Dept: INTERNAL MEDICINE CLINIC | Facility: CLINIC | Age: 60
End: 2020-10-05
Payer: COMMERCIAL

## 2020-10-05 VITALS
TEMPERATURE: 99.4 F | SYSTOLIC BLOOD PRESSURE: 130 MMHG | HEART RATE: 60 BPM | OXYGEN SATURATION: 96 % | HEIGHT: 69 IN | WEIGHT: 219 LBS | DIASTOLIC BLOOD PRESSURE: 84 MMHG | BODY MASS INDEX: 32.44 KG/M2

## 2020-10-05 DIAGNOSIS — E11.69 TYPE 2 DIABETES MELLITUS WITH OTHER SPECIFIED COMPLICATION, WITHOUT LONG-TERM CURRENT USE OF INSULIN (HCC): Primary | ICD-10-CM

## 2020-10-05 DIAGNOSIS — Z12.5 PROSTATE CANCER SCREENING: ICD-10-CM

## 2020-10-05 DIAGNOSIS — I10 ESSENTIAL HYPERTENSION: ICD-10-CM

## 2020-10-05 PROCEDURE — 3079F DIAST BP 80-89 MM HG: CPT | Performed by: INTERNAL MEDICINE

## 2020-10-05 PROCEDURE — 1036F TOBACCO NON-USER: CPT | Performed by: INTERNAL MEDICINE

## 2020-10-05 PROCEDURE — 3725F SCREEN DEPRESSION PERFORMED: CPT | Performed by: INTERNAL MEDICINE

## 2020-10-05 PROCEDURE — 99213 OFFICE O/P EST LOW 20 MIN: CPT | Performed by: INTERNAL MEDICINE

## 2020-10-14 LAB
ALBUMIN SERPL-MCNC: 4.4 G/DL (ref 3.8–4.9)
ALBUMIN/CREAT UR: 3 MG/G CREAT (ref 0–29)
ALBUMIN/GLOB SERPL: 1.8 {RATIO} (ref 1.2–2.2)
ALP SERPL-CCNC: 56 IU/L (ref 39–117)
ALT SERPL-CCNC: 19 IU/L (ref 0–44)
APPEARANCE UR: CLEAR
AST SERPL-CCNC: 15 IU/L (ref 0–40)
BACTERIA URNS QL MICRO: NORMAL
BASOPHILS # BLD AUTO: 0 X10E3/UL (ref 0–0.2)
BASOPHILS NFR BLD AUTO: 1 %
BILIRUB SERPL-MCNC: 0.4 MG/DL (ref 0–1.2)
BILIRUB UR QL STRIP: NEGATIVE
BUN SERPL-MCNC: 14 MG/DL (ref 8–27)
BUN/CREAT SERPL: 13 (ref 10–24)
CALCIUM SERPL-MCNC: 9.4 MG/DL (ref 8.6–10.2)
CHLORIDE SERPL-SCNC: 101 MMOL/L (ref 96–106)
CHOLEST SERPL-MCNC: 130 MG/DL (ref 100–199)
CO2 SERPL-SCNC: 26 MMOL/L (ref 20–29)
COLOR UR: YELLOW
CREAT SERPL-MCNC: 1.11 MG/DL (ref 0.76–1.27)
CREAT UR-MCNC: 104.3 MG/DL
EOSINOPHIL # BLD AUTO: 0.6 X10E3/UL (ref 0–0.4)
EOSINOPHIL NFR BLD AUTO: 8 %
EPI CELLS #/AREA URNS HPF: NORMAL /HPF (ref 0–10)
ERYTHROCYTE [DISTWIDTH] IN BLOOD BY AUTOMATED COUNT: 12.4 % (ref 11.6–15.4)
GLOBULIN SER-MCNC: 2.5 G/DL (ref 1.5–4.5)
GLUCOSE SERPL-MCNC: 141 MG/DL (ref 65–99)
GLUCOSE UR QL: NEGATIVE
HBA1C MFR BLD: 6.9 % (ref 4.8–5.6)
HCT VFR BLD AUTO: 42.1 % (ref 37.5–51)
HDLC SERPL-MCNC: 39 MG/DL
HGB BLD-MCNC: 13.9 G/DL (ref 13–17.7)
HGB UR QL STRIP: NEGATIVE
IMM GRANULOCYTES # BLD: 0 X10E3/UL (ref 0–0.1)
IMM GRANULOCYTES NFR BLD: 0 %
KETONES UR QL STRIP: NEGATIVE
LDLC SERPL CALC-MCNC: 65 MG/DL (ref 0–99)
LDLC SERPL DIRECT ASSAY-MCNC: 67 MG/DL (ref 0–99)
LEUKOCYTE ESTERASE UR QL STRIP: NEGATIVE
LYMPHOCYTES # BLD AUTO: 3.1 X10E3/UL (ref 0.7–3.1)
LYMPHOCYTES NFR BLD AUTO: 41 %
MCH RBC QN AUTO: 30.1 PG (ref 26.6–33)
MCHC RBC AUTO-ENTMCNC: 33 G/DL (ref 31.5–35.7)
MCV RBC AUTO: 91 FL (ref 79–97)
MICRO URNS: NORMAL
MICRO URNS: NORMAL
MICROALBUMIN UR-MCNC: 3.2 UG/ML
MONOCYTES # BLD AUTO: 0.6 X10E3/UL (ref 0.1–0.9)
MONOCYTES NFR BLD AUTO: 8 %
MUCOUS THREADS URNS QL MICRO: PRESENT
NEUTROPHILS # BLD AUTO: 3.3 X10E3/UL (ref 1.4–7)
NEUTROPHILS NFR BLD AUTO: 42 %
NITRITE UR QL STRIP: NEGATIVE
PH UR STRIP: 5.5 [PH] (ref 5–7.5)
PLATELET # BLD AUTO: 263 X10E3/UL (ref 150–450)
POTASSIUM SERPL-SCNC: 4.6 MMOL/L (ref 3.5–5.2)
PROT SERPL-MCNC: 6.9 G/DL (ref 6–8.5)
PROT UR QL STRIP: NEGATIVE
PSA SERPL-MCNC: 3 NG/ML (ref 0–4)
RBC # BLD AUTO: 4.62 X10E6/UL (ref 4.14–5.8)
RBC #/AREA URNS HPF: NORMAL /HPF (ref 0–2)
SL AMB EGFR AFRICAN AMERICAN: 83 ML/MIN/1.73
SL AMB EGFR NON AFRICAN AMERICAN: 72 ML/MIN/1.73
SL AMB VLDL CHOLESTEROL CALC: 26 MG/DL (ref 5–40)
SODIUM SERPL-SCNC: 141 MMOL/L (ref 134–144)
SP GR UR: 1.01 (ref 1–1.03)
TRIGL SERPL-MCNC: 148 MG/DL (ref 0–149)
TSH SERPL DL<=0.005 MIU/L-ACNC: 2.34 UIU/ML (ref 0.45–4.5)
UROBILINOGEN UR STRIP-ACNC: 0.2 MG/DL (ref 0.2–1)
WBC # BLD AUTO: 7.6 X10E3/UL (ref 3.4–10.8)
WBC #/AREA URNS HPF: NORMAL /HPF (ref 0–5)

## 2020-10-14 PROCEDURE — 3061F NEG MICROALBUMINURIA REV: CPT | Performed by: INTERNAL MEDICINE

## 2020-10-14 PROCEDURE — 3044F HG A1C LEVEL LT 7.0%: CPT | Performed by: INTERNAL MEDICINE

## 2020-11-17 ENCOUNTER — TELEPHONE (OUTPATIENT)
Dept: INTERNAL MEDICINE CLINIC | Facility: CLINIC | Age: 60
End: 2020-11-17

## 2020-11-17 DIAGNOSIS — I10 ESSENTIAL HYPERTENSION: ICD-10-CM

## 2020-11-17 RX ORDER — SILDENAFIL CITRATE 20 MG/1
TABLET ORAL
Qty: 30 TABLET | Refills: 0 | Status: SHIPPED | OUTPATIENT
Start: 2020-11-17 | End: 2020-11-17 | Stop reason: SDUPTHER

## 2020-11-17 RX ORDER — SILDENAFIL CITRATE 20 MG/1
TABLET ORAL
Qty: 30 TABLET | Refills: 0 | Status: SHIPPED | OUTPATIENT
Start: 2020-11-17 | End: 2022-02-28

## 2020-12-03 DIAGNOSIS — I10 ESSENTIAL HYPERTENSION: ICD-10-CM

## 2020-12-04 RX ORDER — METOPROLOL SUCCINATE 25 MG/1
TABLET, EXTENDED RELEASE ORAL
Qty: 45 TABLET | Refills: 0 | Status: SHIPPED | OUTPATIENT
Start: 2020-12-04 | End: 2021-01-06 | Stop reason: SDUPTHER

## 2021-01-06 RX ORDER — METOPROLOL SUCCINATE 25 MG/1
25 TABLET, EXTENDED RELEASE ORAL DAILY
Qty: 45 TABLET | Refills: 3 | Status: SHIPPED | OUTPATIENT
Start: 2021-01-06 | End: 2021-02-20

## 2021-02-19 DIAGNOSIS — I10 ESSENTIAL HYPERTENSION: ICD-10-CM

## 2021-02-19 DIAGNOSIS — E78.2 MIXED HYPERLIPIDEMIA: ICD-10-CM

## 2021-02-19 DIAGNOSIS — E11.69 TYPE 2 DIABETES MELLITUS WITH OTHER SPECIFIED COMPLICATION, WITHOUT LONG-TERM CURRENT USE OF INSULIN (HCC): ICD-10-CM

## 2021-02-20 RX ORDER — METOPROLOL SUCCINATE 25 MG/1
TABLET, EXTENDED RELEASE ORAL
Qty: 45 TABLET | Refills: 0 | Status: SHIPPED | OUTPATIENT
Start: 2021-02-20 | End: 2021-03-03 | Stop reason: SDUPTHER

## 2021-03-03 NOTE — TELEPHONE ENCOUNTER
metoprolol succinate (TOPROL-XL) 25 mg 24 hr tablet    metFORMIN (GLUCOPHAGE-XR) 500 mg 24 hr tablet    atorvastatin (LIPITOR) 20 mg tablet    [facility] # [phone]     # [phone]

## 2021-03-04 RX ORDER — ATORVASTATIN CALCIUM 20 MG/1
20 TABLET, FILM COATED ORAL DAILY
Qty: 90 TABLET | Refills: 3 | Status: SHIPPED | OUTPATIENT
Start: 2021-03-04 | End: 2022-04-18 | Stop reason: SDUPTHER

## 2021-03-04 RX ORDER — METOPROLOL SUCCINATE 25 MG/1
TABLET, EXTENDED RELEASE ORAL
Qty: 135 TABLET | Refills: 3 | Status: SHIPPED | OUTPATIENT
Start: 2021-03-04 | End: 2022-02-28

## 2021-03-04 RX ORDER — METFORMIN HYDROCHLORIDE 500 MG/1
TABLET, EXTENDED RELEASE ORAL
Qty: 360 TABLET | Refills: 3 | Status: SHIPPED | OUTPATIENT
Start: 2021-03-04 | End: 2022-03-08

## 2021-04-22 DIAGNOSIS — E11.69 TYPE 2 DIABETES MELLITUS WITH OTHER SPECIFIED COMPLICATION, WITHOUT LONG-TERM CURRENT USE OF INSULIN (HCC): ICD-10-CM

## 2021-04-22 NOTE — TELEPHONE ENCOUNTER
# [phone]    omega-3-acid ethyl esters (LOVAZA) 1 g capsule    [facility] # [phone]

## 2021-04-23 RX ORDER — OMEGA-3-ACID ETHYL ESTERS 1 G/1
1 CAPSULE, LIQUID FILLED ORAL 2 TIMES DAILY
Qty: 180 CAPSULE | Refills: 3 | Status: SHIPPED | OUTPATIENT
Start: 2021-04-23 | End: 2022-04-18 | Stop reason: SDUPTHER

## 2021-06-07 ENCOUNTER — OFFICE VISIT (OUTPATIENT)
Dept: INTERNAL MEDICINE CLINIC | Facility: CLINIC | Age: 61
End: 2021-06-07
Payer: COMMERCIAL

## 2021-06-07 VITALS
OXYGEN SATURATION: 96 % | WEIGHT: 224 LBS | SYSTOLIC BLOOD PRESSURE: 128 MMHG | DIASTOLIC BLOOD PRESSURE: 86 MMHG | HEIGHT: 69 IN | TEMPERATURE: 99.3 F | HEART RATE: 109 BPM | BODY MASS INDEX: 33.18 KG/M2

## 2021-06-07 DIAGNOSIS — B02.9 HERPES ZOSTER WITHOUT COMPLICATION: Primary | ICD-10-CM

## 2021-06-07 PROCEDURE — 99214 OFFICE O/P EST MOD 30 MIN: CPT | Performed by: FAMILY MEDICINE

## 2021-06-07 RX ORDER — VALACYCLOVIR HYDROCHLORIDE 1 G/1
1000 TABLET, FILM COATED ORAL 3 TIMES DAILY
Qty: 21 TABLET | Refills: 0 | Status: SHIPPED | OUTPATIENT
Start: 2021-06-07 | End: 2022-05-17

## 2021-06-07 NOTE — PATIENT INSTRUCTIONS
Take the antiviral for the shingles infection as prescribed  You can use tylenol or ibuprofen for pain relief  Continue using cold compress  Shingles   AMBULATORY CARE:   Shingles  is a painful rash  Shingles is caused by the same virus that causes chickenpox (varicella-zoster)  After you get chickenpox, the virus stays in your body for several years without causing any symptoms  Shingles occurs when the virus becomes active again  The active virus travels along a nerve to your skin and causes a rash  Common signs and symptoms include the following:  Shingles often starts with pain in the back, chest, neck, or face  A rash then develops in the same area  The rash is usually found on only one side of the body  The rash may feel itchy or painful  It starts as red dots that become blisters filled with fluid  The blisters usually grow bigger, become filled with pus, and then crust over after a few days  You may also have any of the following:  · Fatigue and muscle weakness    · Pain when your skin is lightly touched    · Headache    · Fever    · Eye pain when exposed to light    Call your local emergency number (911 in the [facility]) if:   · You have trouble moving your arms, legs, or face  · You become confused, or have difficulty speaking  · You have a seizure  Seek care immediately if:   · You have weakness in an arm or leg  · You have dizziness, a severe headache, or hearing or vision loss  · You have painful, red, warm skin around the blisters, or the blisters drain pus  · Your neck is stiff or you have trouble moving it  Call your doctor if:   · You feel weak or have a headache  · You have a cough, chills, or a fever  · You have abdominal pain or nausea, or you are vomiting  · Your rash becomes more itchy or painful  · Your rash spreads to other parts of your body  · Your pain worsens and does not go away even after you take medicine      · You have questions or concerns about your condition or care  Medicines: You may need any of the following:  · Antiviral medicine  helps decrease symptoms and healing time  They may also decrease your risk of developing nerve pain  You will need to start taking them within 3 days of the start of symptoms to prevent nerve pain  · Pain medicine  may be prescribed or suggested by your healthcare provider  You may need NSAIDs, acetaminophen, or opioid medicine depending on how much pain you are in  Do not wait until the pain is severe before you take more pain medicine  · Topical anesthetics  are used to numb the skin and decrease pain  They can be a cream, gel, spray, or patch  · Anticonvulsants  decrease nerve pain and may help you sleep at night  · Antidepressants  may be used to decrease nerve pain  Self-care:  Keep your rash clean and dry  Cover your rash with a bandage or clothing  Do not use bandages that stick to your skin  The sticky part may irritate your skin and make your rash last longer  Prevent the spread of shingles:       · Wash your hands often  Wash your hands several times each day  Wash after you use the bathroom, change a child's diaper, and before you prepare or eat food  Use soap and water every time  Rub your soapy hands together, lacing your fingers  Wash the front and back of your hands, and in between your fingers  Use the fingers of one hand to scrub under the fingernails of the other hand  Wash for at least 20 seconds  Rinse with warm, running water for several seconds  Then dry your hands with a clean towel or paper towel  Use hand  that contains alcohol if soap and water are not available  Do not touch your eyes, nose, or mouth without washing your hands first          · Cover a sneeze or cough  Use a tissue that covers your mouth and nose  Throw the tissue away in a trash can right away  Use the bend of your arm if a tissue is not available   Wash your hands well with soap and water or use a hand   · Stay away from others while you are sick  Avoid crowds as much as possible  · Ask about vaccines you may need  Talk to your healthcare provider about your vaccine history  He or she will tell you which vaccines you need, and when to get them  Prevent shingles or another shingles outbreak:  A vaccine may be given to help prevent shingles  You can get the vaccine even if you already had shingles  The vaccine can help prevent a future outbreak  If you do get shingles again, the vaccine can keep it from becoming severe  The vaccine comes in 2 forms  Your healthcare provider will tell you which form is right for you  The decision is based on your age and any medical conditions you have  A 2-dose vaccine is usually given to adults 48 years or older  A 1-dose vaccine may be given to adults 61 years or older  For more information:   · Centers for Disease Control and Prevention  [address] , [address]  Phone: 2- 242 - 1097507  Phone: 3- 196 - 4648471  Web Address: [employer] com br    Follow up with your doctor as directed:  Write down your questions so you remember to ask them during your visits  © Copyright [address] [place] [address] Drive Information is for End User's use only and may not be sold, redistributed or otherwise used for commercial purposes  All illustrations and images included in CareNotes® are the copyrighted property of A D A M , Inc  or Rashid Ramirez  The above information is an  only  It is not intended as medical advice for individual conditions or treatments  Talk to your doctor, nurse or pharmacist before following any medical regimen to see if it is safe and effective for you

## 2021-06-07 NOTE — PROGRESS NOTES
FOLLOW-UP OFFICE VISIT  [facility] - [facility]    NAME: Darwin Dunbar  AGE: 63 y o  SEX: male  : 1960     DATE: 2021     Assessment and Plan:     Problem List Items Addressed This Visit     None      Visit Diagnoses     Herpes zoster without complication    -  Primary    Relevant Medications    valACYclovir (VALTREX) 1,000 mg tablet        Plan:  7 days antiviral therapy  Discussed post herpetic neuralgia with patient as a possibility  Discussed possible need for neuropathic medications in the near future  Patient to discuss this with PCP further if cold compresses stop providing  significant pain relief  Return if symptoms worsen or fail to improve  Chief Complaint:     Chief Complaint   Patient presents with    Itching     patient in office to have right side of chest checked , patient has red spots that travels to upper back  History of Present Illness: Onset- a week and a half after covid vaccine  Started with soreness and swelling  Then a rash started and the area became more and more irritating  Was using cold compress on the area to reduce the swelling  Only provided temporary relief  Then switched to warm compress and the area enlarged and the rash worsened  Rash is red and the pain is burning in quality  Under right arm and along right chest    Had varicella as a child  Never had shingles vaccine  Review of Systems:     Review of Systems   Constitutional: Negative for fever  Eyes: Negative for pain and visual disturbance  Cardiovascular: Negative for chest pain  Gastrointestinal: Negative for diarrhea and nausea  Musculoskeletal: Negative for gait problem  Skin: Positive for rash          Problem List:     Patient Active Problem List   Diagnosis    Diabetes mellitus ([address] )    Asthma    Hypertension    Skin rash    Prostate cancer screening    Pre-operative cardiovascular examination    Other specified glaucoma    Hypertensive heart disease without heart failure    Right elbow pain    Chest pressure    Mixed hyperlipidemia    Family history of early CAD    Claudication ([facility] [address] )        Objective:     /86   Pulse (!) 109   Temp 99 3 °F (37 4 °C)   Ht 5' 9" (1 753 m)   Wt 102 kg (224 lb)   SpO2 96%   BMI 33 08 kg/m²     Physical Exam  HENT:      Head: Normocephalic and atraumatic  Right Ear: External ear normal       Left Ear: External ear normal    Eyes:      Conjunctiva/sclera: Conjunctivae normal       Pupils: Pupils are equal, round, and reactive to light  Cardiovascular:      Rate and Rhythm: Normal rate  Pulmonary:      Effort: Pulmonary effort is normal    Skin:     General: Skin is warm  Findings: Rash present  Rash is vesicular  Comments:   Scattered vesicles with erythematous base clustered along T2-T4 dermatome from axilla up to right parasternal area of the chest   Does not cross midline  Neurological:      Mental Status: He is alert and oriented to person, place, and time     Psychiatric:         Mood and Affect: Mood normal          Behavior: Behavior normal                     Mina LYLES HSPTLAbigail [facility]  6/7/2021 3:34 PM

## 2021-06-12 ENCOUNTER — OFFICE VISIT (OUTPATIENT)
Dept: INTERNAL MEDICINE CLINIC | Facility: CLINIC | Age: 61
End: 2021-06-12
Payer: COMMERCIAL

## 2021-06-12 VITALS
WEIGHT: 223.6 LBS | OXYGEN SATURATION: 95 % | BODY MASS INDEX: 33.12 KG/M2 | HEIGHT: 69 IN | HEART RATE: 61 BPM | TEMPERATURE: 98.2 F | DIASTOLIC BLOOD PRESSURE: 70 MMHG | SYSTOLIC BLOOD PRESSURE: 128 MMHG

## 2021-06-12 DIAGNOSIS — B02.9 HERPES ZOSTER WITHOUT COMPLICATION: Primary | ICD-10-CM

## 2021-06-12 PROCEDURE — 3008F BODY MASS INDEX DOCD: CPT | Performed by: INTERNAL MEDICINE

## 2021-06-12 PROCEDURE — 3725F SCREEN DEPRESSION PERFORMED: CPT | Performed by: INTERNAL MEDICINE

## 2021-06-12 PROCEDURE — 1036F TOBACCO NON-USER: CPT | Performed by: INTERNAL MEDICINE

## 2021-06-12 PROCEDURE — 99213 OFFICE O/P EST LOW 20 MIN: CPT | Performed by: INTERNAL MEDICINE

## 2021-06-12 NOTE — PATIENT INSTRUCTIONS
Uncomplicated zoster improving  Anticipate resolution  Use topical lidocaine for pain control   Finish out L cyclovir  Next visit should be in December        Shingles vaccine a year

## 2021-06-12 NOTE — PROGRESS NOTES
Assessment/Plan:       Diagnoses and all orders for this visit:    Herpes zoster without complication                Subjective:      Patient ID: Stephanie Guo is a 63 y o  male  Uncomplicated zoster   Seen here 5 days ago with a chief complaint of a painful rash right upper chest wall  Impression from the treating physician was herpes zoster he was treated with Valcyclovir  This situation is improving  This rash was vesicular and is now closing over and there are no open lesions  1 lesion involving the right nipple is a bit more painful than others  No systemic symptoms  The following portions of the patient's history were reviewed and updated as appropriate:   He has a past medical history of Asthma, Chronic kidney disease, Diabetes mellitus ([facility] [address] ), Hypertension, and Kidney stone  ,  does not have any pertinent problems on file  ,   has a past surgical history that includes Shoulder surgery (2015); Cholecystectomy; and Rotator cuff repair  ,  family history includes Cancer in his father; Diabetes in his mother; Hypertension in his brother, father, and mother  ,   reports that he has never smoked  He has never used smokeless tobacco  He reports current alcohol use  He reports that he does not use drugs  ,  has No Known Allergies     Current Outpatient Medications   Medication Sig Dispense Refill    amLODIPine (NORVASC) 5 mg tablet Take 1 tablet (5 mg total) by mouth daily 90 tablet 3    atorvastatin (LIPITOR) 20 mg tablet Take 1 tablet (20 mg total) by mouth daily 90 tablet 3    glucose blood test strip 1 each by Other route 4 (four) times daily (after meals and at bedtime) Test once daily or as directed 100 each 2    Lancets (ONETOUCH ULTRASOFT) lancets Test once daily or as directed 100 each 3    lisinopril (ZESTRIL) 40 mg tablet Take 1 tablet (40 mg total) by mouth daily 90 tablet 3    metFORMIN (GLUCOPHAGE-XR) 500 mg 24 hr tablet 4 TABLETS DAILY 360 tablet 3    metoprolol succinate (TOPROL-XL) 25 mg 24 hr tablet 1 and1/2 tablets  tablet 3    montelukast (SINGULAIR) 10 mg tablet Take 1 tablet (10 mg total) by mouth daily at bedtime 90 tablet 3    omega-3-acid ethyl esters (LOVAZA) 1 g capsule Take 1 capsule (1 g total) by mouth 2 (two) times a day 180 capsule 3    sildenafil (REVATIO) 20 mg tablet TAKE 2 TABLETS BY MOUTH 1 HOUR BEFORE INTERCOURSE 30 tablet 0    valACYclovir (VALTREX) 1,000 mg tablet Take 1 tablet (1,000 mg total) by mouth 3 (three) times a day for 7 days 21 tablet 0    aspirin (ECOTRIN LOW STRENGTH) 81 mg EC tablet Take 81 mg by mouth daily      cholecalciferol (VITAMIN D3) 1,000 units tablet Take 1 tablet (1,000 Units total) by mouth daily (Patient not taking: Reported on 11/22/2019) 90 tablet 1     No current facility-administered medications for this visit  Review of Systems   Skin: Positive for rash  All other systems reviewed and are negative  Objective:  Vitals:    06/12/21 0911   BP: 128/70   Pulse: 61   Temp: 98 2 °F (36 8 °C)   SpO2: 95%      Physical Exam  Cardiovascular:      Rate and Rhythm: Normal rate  Abdominal:      General: Abdomen is flat  Skin:     General: Skin is warm  Findings: Rash present  Comments: Crusting lesions in a radiculopathic distribution right upper chest wall extending from the axilla to the nipple area  No drainage  No vesicles  Neurological:      General: No focal deficit present  Mental Status: He is alert  Patient Instructions   Uncomplicated zoster improving  Anticipate resolution  Use topical lidocaine for pain control   Finish out L cyclovir  Next visit should be in December        Shingles vaccine a year

## 2021-07-01 DIAGNOSIS — I10 ESSENTIAL HYPERTENSION: ICD-10-CM

## 2021-07-01 DIAGNOSIS — J45.20 MILD INTERMITTENT ASTHMA WITHOUT COMPLICATION: ICD-10-CM

## 2021-07-01 PROCEDURE — 4010F ACE/ARB THERAPY RXD/TAKEN: CPT | Performed by: INTERNAL MEDICINE

## 2021-07-01 RX ORDER — MONTELUKAST SODIUM 10 MG/1
10 TABLET ORAL
Qty: 90 TABLET | Refills: 3 | Status: SHIPPED | OUTPATIENT
Start: 2021-07-01 | End: 2022-07-01 | Stop reason: SDUPTHER

## 2021-07-01 RX ORDER — LISINOPRIL 40 MG/1
40 TABLET ORAL DAILY
Qty: 90 TABLET | Refills: 3 | Status: SHIPPED | OUTPATIENT
Start: 2021-07-01 | End: 2022-07-01 | Stop reason: SDUPTHER

## 2021-07-22 DIAGNOSIS — I10 ESSENTIAL HYPERTENSION: ICD-10-CM

## 2021-07-22 RX ORDER — AMLODIPINE BESYLATE 5 MG/1
TABLET ORAL
Qty: 90 TABLET | Refills: 0 | Status: SHIPPED | OUTPATIENT
Start: 2021-07-22 | End: 2021-11-06 | Stop reason: SDUPTHER

## 2021-10-05 ENCOUNTER — HOSPITAL ENCOUNTER (EMERGENCY)
Facility: HOSPITAL | Age: 61
Discharge: HOME | End: 2021-10-05
Admitting: EMERGENCY MEDICINE
Payer: COMMERCIAL

## 2021-10-05 VITALS
DIASTOLIC BLOOD PRESSURE: 67 MMHG | TEMPERATURE: 97.6 F | RESPIRATION RATE: 16 BRPM | OXYGEN SATURATION: 99 % | HEART RATE: 58 BPM | SYSTOLIC BLOOD PRESSURE: 134 MMHG

## 2021-10-05 DIAGNOSIS — S61.215A LACERATION OF LEFT RING FINGER: Primary | ICD-10-CM

## 2021-10-05 PROCEDURE — 99282 EMERGENCY DEPT VISIT SF MDM: CPT | Performed by: PHYSICIAN ASSISTANT

## 2021-10-05 PROCEDURE — 99283 EMERGENCY DEPT VISIT LOW MDM: CPT

## 2021-10-05 PROCEDURE — 90471 IMMUNIZATION ADMIN: CPT

## 2021-10-05 PROCEDURE — 12001 RPR S/N/AX/GEN/TRNK 2.5CM/<: CPT | Performed by: PHYSICIAN ASSISTANT

## 2021-10-05 PROCEDURE — 90715 TDAP VACCINE 7 YRS/> IM: CPT | Performed by: PHYSICIAN ASSISTANT

## 2021-10-05 RX ORDER — LIDOCAINE HYDROCHLORIDE 10 MG/ML
5 INJECTION, SOLUTION EPIDURAL; INFILTRATION; INTRACAUDAL; PERINEURAL ONCE
Status: COMPLETED | OUTPATIENT
Start: 2021-10-05 | End: 2021-10-05

## 2021-10-05 RX ADMIN — TETANUS TOXOID, REDUCED DIPHTHERIA TOXOID AND ACELLULAR PERTUSSIS VACCINE, ADSORBED 0.5 ML: 5; 2.5; 8; 8; 2.5 SUSPENSION INTRAMUSCULAR at 14:19

## 2021-10-05 RX ADMIN — LIDOCAINE HYDROCHLORIDE 5 ML: 10 INJECTION, SOLUTION EPIDURAL; INFILTRATION; INTRACAUDAL at 14:19

## 2021-11-06 DIAGNOSIS — I10 ESSENTIAL HYPERTENSION: ICD-10-CM

## 2021-11-06 RX ORDER — AMLODIPINE BESYLATE 5 MG/1
5 TABLET ORAL DAILY
Qty: 30 TABLET | Refills: 0 | Status: SHIPPED | OUTPATIENT
Start: 2021-11-06 | End: 2021-12-06

## 2021-11-23 ENCOUNTER — TELEPHONE (OUTPATIENT)
Dept: INTERNAL MEDICINE CLINIC | Facility: CLINIC | Age: 61
End: 2021-11-23

## 2021-11-24 DIAGNOSIS — Z12.5 PROSTATE CANCER SCREENING: ICD-10-CM

## 2021-11-24 DIAGNOSIS — E11.69 TYPE 2 DIABETES MELLITUS WITH OTHER SPECIFIED COMPLICATION, WITHOUT LONG-TERM CURRENT USE OF INSULIN (HCC): ICD-10-CM

## 2021-11-24 DIAGNOSIS — E78.2 MIXED HYPERLIPIDEMIA: ICD-10-CM

## 2021-11-24 DIAGNOSIS — Z12.11 COLON CANCER SCREENING: ICD-10-CM

## 2021-11-24 DIAGNOSIS — I10 ESSENTIAL HYPERTENSION: Primary | ICD-10-CM

## 2021-11-27 LAB
ALBUMIN SERPL-MCNC: 4.4 G/DL (ref 3.8–4.8)
ALBUMIN/GLOB SERPL: 2 {RATIO} (ref 1.2–2.2)
ALP SERPL-CCNC: 58 IU/L (ref 44–121)
ALT SERPL-CCNC: 16 IU/L (ref 0–44)
AST SERPL-CCNC: 14 IU/L (ref 0–40)
BASOPHILS # BLD AUTO: 0 X10E3/UL (ref 0–0.2)
BASOPHILS NFR BLD AUTO: 1 %
BILIRUB SERPL-MCNC: 0.3 MG/DL (ref 0–1.2)
BUN SERPL-MCNC: 17 MG/DL (ref 8–27)
BUN/CREAT SERPL: 14 (ref 10–24)
CALCIUM SERPL-MCNC: 9.6 MG/DL (ref 8.6–10.2)
CHLORIDE SERPL-SCNC: 101 MMOL/L (ref 96–106)
CHOLEST SERPL-MCNC: 138 MG/DL (ref 100–199)
CO2 SERPL-SCNC: 24 MMOL/L (ref 20–29)
CREAT SERPL-MCNC: 1.2 MG/DL (ref 0.76–1.27)
EOSINOPHIL # BLD AUTO: 0.5 X10E3/UL (ref 0–0.4)
EOSINOPHIL NFR BLD AUTO: 6 %
ERYTHROCYTE [DISTWIDTH] IN BLOOD BY AUTOMATED COUNT: 12.1 % (ref 11.6–15.4)
EST. AVERAGE GLUCOSE BLD GHB EST-MCNC: 192 MG/DL
GLOBULIN SER-MCNC: 2.2 G/DL (ref 1.5–4.5)
GLUCOSE SERPL-MCNC: 199 MG/DL (ref 65–99)
HBA1C MFR BLD: 8.3 % (ref 4.8–5.6)
HCT VFR BLD AUTO: 40.2 % (ref 37.5–51)
HDLC SERPL-MCNC: 32 MG/DL
HGB BLD-MCNC: 13.7 G/DL (ref 13–17.7)
IMM GRANULOCYTES # BLD: 0 X10E3/UL (ref 0–0.1)
IMM GRANULOCYTES NFR BLD: 0 %
LDLC SERPL CALC-MCNC: 69 MG/DL (ref 0–99)
LDLC/HDLC SERPL: 2.2 RATIO (ref 0–3.6)
LYMPHOCYTES # BLD AUTO: 2.7 X10E3/UL (ref 0.7–3.1)
LYMPHOCYTES NFR BLD AUTO: 34 %
MCH RBC QN AUTO: 30.8 PG (ref 26.6–33)
MCHC RBC AUTO-ENTMCNC: 34.1 G/DL (ref 31.5–35.7)
MCV RBC AUTO: 90 FL (ref 79–97)
MONOCYTES # BLD AUTO: 0.7 X10E3/UL (ref 0.1–0.9)
MONOCYTES NFR BLD AUTO: 8 %
NEUTROPHILS # BLD AUTO: 4 X10E3/UL (ref 1.4–7)
NEUTROPHILS NFR BLD AUTO: 51 %
PLATELET # BLD AUTO: 271 X10E3/UL (ref 150–450)
POTASSIUM SERPL-SCNC: 4.6 MMOL/L (ref 3.5–5.2)
PROT SERPL-MCNC: 6.6 G/DL (ref 6–8.5)
RBC # BLD AUTO: 4.45 X10E6/UL (ref 4.14–5.8)
SL AMB EGFR AFRICAN AMERICAN: 75 ML/MIN/1.73
SL AMB EGFR NON AFRICAN AMERICAN: 65 ML/MIN/1.73
SL AMB VLDL CHOLESTEROL CALC: 37 MG/DL (ref 5–40)
SODIUM SERPL-SCNC: 140 MMOL/L (ref 134–144)
TRIGL SERPL-MCNC: 222 MG/DL (ref 0–149)
TSH SERPL DL<=0.005 MIU/L-ACNC: 1.88 UIU/ML (ref 0.45–4.5)
WBC # BLD AUTO: 7.9 X10E3/UL (ref 3.4–10.8)

## 2021-11-27 PROCEDURE — 3052F HG A1C>EQUAL 8.0%<EQUAL 9.0%: CPT | Performed by: INTERNAL MEDICINE

## 2021-11-29 ENCOUNTER — OFFICE VISIT (OUTPATIENT)
Dept: INTERNAL MEDICINE CLINIC | Facility: CLINIC | Age: 61
End: 2021-11-29
Payer: COMMERCIAL

## 2021-11-29 VITALS
TEMPERATURE: 99.6 F | BODY MASS INDEX: 32.7 KG/M2 | HEIGHT: 69 IN | SYSTOLIC BLOOD PRESSURE: 142 MMHG | OXYGEN SATURATION: 99 % | HEART RATE: 76 BPM | WEIGHT: 220.8 LBS | DIASTOLIC BLOOD PRESSURE: 88 MMHG

## 2021-11-29 DIAGNOSIS — I11.9 HYPERTENSIVE HEART DISEASE WITHOUT HEART FAILURE: ICD-10-CM

## 2021-11-29 DIAGNOSIS — Z80.42 FAMILY HISTORY OF PROSTATE CANCER: ICD-10-CM

## 2021-11-29 DIAGNOSIS — I10 PRIMARY HYPERTENSION: Primary | ICD-10-CM

## 2021-11-29 DIAGNOSIS — J45.20 MILD INTERMITTENT ASTHMA WITHOUT COMPLICATION: ICD-10-CM

## 2021-11-29 DIAGNOSIS — E11.69 TYPE 2 DIABETES MELLITUS WITH OTHER SPECIFIED COMPLICATION, WITHOUT LONG-TERM CURRENT USE OF INSULIN (HCC): ICD-10-CM

## 2021-11-29 DIAGNOSIS — N40.1 BENIGN PROSTATIC HYPERPLASIA WITH INCOMPLETE BLADDER EMPTYING: ICD-10-CM

## 2021-11-29 DIAGNOSIS — R39.14 BENIGN PROSTATIC HYPERPLASIA WITH INCOMPLETE BLADDER EMPTYING: ICD-10-CM

## 2021-11-29 LAB
ALBUMIN/CREAT UR: <3 MG/G CREAT (ref 0–29)
APPEARANCE UR: CLEAR
BILIRUB UR QL STRIP: NEGATIVE
COLOR UR: YELLOW
CREAT UR-MCNC: 88.4 MG/DL
GLUCOSE UR QL: NEGATIVE
HGB UR QL STRIP: NEGATIVE
KETONES UR QL STRIP: NEGATIVE
LEUKOCYTE ESTERASE UR QL STRIP: NEGATIVE
MICRO URNS: NORMAL
MICROALBUMIN UR-MCNC: <3 UG/ML
NITRITE UR QL STRIP: NEGATIVE
PH UR STRIP: 6 [PH] (ref 5–7.5)
PROT UR QL STRIP: NEGATIVE
PSA SERPL-MCNC: 3.6 NG/ML (ref 0–4)
SP GR UR: 1.01 (ref 1–1.03)
UROBILINOGEN UR STRIP-ACNC: 0.2 MG/DL (ref 0.2–1)

## 2021-11-29 PROCEDURE — 99214 OFFICE O/P EST MOD 30 MIN: CPT | Performed by: INTERNAL MEDICINE

## 2021-11-29 PROCEDURE — 1036F TOBACCO NON-USER: CPT | Performed by: INTERNAL MEDICINE

## 2021-11-29 PROCEDURE — 3008F BODY MASS INDEX DOCD: CPT | Performed by: INTERNAL MEDICINE

## 2021-12-06 ENCOUNTER — TELEPHONE (OUTPATIENT)
Dept: INTERNAL MEDICINE CLINIC | Facility: CLINIC | Age: 61
End: 2021-12-06

## 2021-12-06 DIAGNOSIS — I10 ESSENTIAL HYPERTENSION: ICD-10-CM

## 2021-12-06 RX ORDER — AMLODIPINE BESYLATE 5 MG/1
TABLET ORAL
Qty: 30 TABLET | Refills: 0 | Status: SHIPPED | OUTPATIENT
Start: 2021-12-06 | End: 2022-01-05 | Stop reason: SDUPTHER

## 2021-12-06 NOTE — TELEPHONE ENCOUNTER
One touch ultra test strips  Earlene Nissen  test 2 x's daily  Amlodipine 5 mg QD      [facility]   [phone]      PT's call back [phone] (H)

## 2022-01-05 DIAGNOSIS — I10 ESSENTIAL HYPERTENSION: ICD-10-CM

## 2022-01-05 RX ORDER — AMLODIPINE BESYLATE 5 MG/1
5 TABLET ORAL DAILY
Qty: 30 TABLET | Refills: 3 | Status: SHIPPED | OUTPATIENT
Start: 2022-01-05 | End: 2022-04-18 | Stop reason: SDUPTHER

## 2022-01-10 ENCOUNTER — TELEMEDICINE (OUTPATIENT)
Dept: INTERNAL MEDICINE CLINIC | Facility: CLINIC | Age: 62
End: 2022-01-10
Payer: COMMERCIAL

## 2022-01-10 DIAGNOSIS — B34.9 VIRAL ILLNESS: Primary | ICD-10-CM

## 2022-01-10 DIAGNOSIS — B34.9 VIRAL INFECTION, UNSPECIFIED: Primary | ICD-10-CM

## 2022-01-10 PROCEDURE — 99213 OFFICE O/P EST LOW 20 MIN: CPT | Performed by: NURSE PRACTITIONER

## 2022-01-10 PROCEDURE — U0003 INFECTIOUS AGENT DETECTION BY NUCLEIC ACID (DNA OR RNA); SEVERE ACUTE RESPIRATORY SYNDROME CORONAVIRUS 2 (SARS-COV-2) (CORONAVIRUS DISEASE [COVID-19]), AMPLIFIED PROBE TECHNIQUE, MAKING USE OF HIGH THROUGHPUT TECHNOLOGIES AS DESCRIBED BY CMS-2020-01-R: HCPCS | Performed by: NURSE PRACTITIONER

## 2022-01-10 PROCEDURE — U0005 INFEC AGEN DETEC AMPLI PROBE: HCPCS | Performed by: NURSE PRACTITIONER

## 2022-01-10 NOTE — PROGRESS NOTES
COVID-19 Outpatient Progress Note    Assessment/Plan:    Problem List Items Addressed This Visit     None      Visit Diagnoses     Viral infection, unspecified    -  Primary    Relevant Orders    COVID Only - Office Collect         Disposition:     Recommended patient to come to the office to test for COVID-19  Patient is fully vaccinated and I recommended self quarantine for 5 days followed by strict mask use for an additional 5 days  If patient were to develop symptoms, they should immediately self isolate and call our office for further guidance  The patient started with symptoms on Saturday January 8th of body aches, sore throat chills and congestion  His wife had some symptoms several days prior to him feeling sick  Patient had his primary vaccine but no booster  He wishes to be COVID swab today  He is aware this may take 24-48 hours  He has been advised he should continue to quarantine until these results are final     I have spent 15 minutes directly with the patient  Greater than 50% of this time was spent in counseling/coordination of care regarding: risks and benefits of treatment options, instructions for management and impressions  Encounter provider MILADY Hoang    Provider located at [address] [place] [address] [address]    Recent Visits  No visits were found meeting these conditions  Showing recent visits within past 7 days and meeting all other requirements  Future Appointments  No visits were found meeting these conditions  Showing future appointments within next 150 days and meeting all other requirements     This virtual check-in was done via [address] and patient was informed that this is a secure, HIPAA-compliant platform  He agrees to proceed      Patient agrees to participate in a virtual check in via telephone or video visit instead of presenting to the office to address urgent/immediate medical needs  Patient is aware this is a billable service  After connecting through [facility], the patient was identified by name and date of birth  Parag Doan was informed that this was a telemedicine visit and that the exam was being conducted confidentially over secure lines  Parag Doan acknowledged consent and understanding of privacy and security of the telemedicine visit  I informed the patient that I have reviewed his record in Epic and presented the opportunity for him to ask any questions regarding the visit today  The patient agreed to participate  Verification of patient location:  Patient is located in the following state in which I hold an active license: [place]    Subjective: Parag Doan is a 57 y o  male who is concerned about COVID-19  Patient's symptoms include chills, fatigue, sore throat and myalgias  Patient denies congestion, rhinorrhea, shortness of breath and headaches       - Date of symptom onset: 1/8/2022      COVID-19 vaccination status: Fully vaccinated (primary series)    Exposure:   Contact with a person who is under investigation (PUI) for or who is positive for COVID-19 within the last 14 days?: No    Hospitalized recently for fever and/or lower respiratory symptoms?: No      Currently a healthcare worker that is involved in direct patient care?: No      Works in a special setting where the risk of COVID-19 transmission may be high? (this may include long-term care, correctional and [facility] facilities; homeless shelters; assisted-living facilities and group homes ): No      Resident in a special setting where the risk of COVID-19 transmission may be high? (this may include long-term care, correctional and [facility] facilities; homeless shelters; assisted-living facilities and group homes ): No      No results found for: SARSCOV2, [address] [facility], [address] [place] [address],[address], CORONAVIRUSR, [facility]  Past Medical History:   Diagnosis Date    Asthma     Chronic kidney disease     Diabetes mellitus ([facility] [address] )     Hypertension     Kidney stone      Past Surgical History:   Procedure Laterality Date    CHOLECYSTECTOMY      ROTATOR CUFF REPAIR      SHOULDER SURGERY  2015     Current Outpatient Medications   Medication Sig Dispense Refill    amLODIPine (NORVASC) 5 mg tablet Take 1 tablet (5 mg total) by mouth daily 30 tablet 3    aspirin (ECOTRIN LOW STRENGTH) 81 mg EC tablet Take 81 mg by mouth daily      atorvastatin (LIPITOR) 20 mg tablet Take 1 tablet (20 mg total) by mouth daily 90 tablet 3    glucose blood test strip Test once daily or as directed 100 each 2    Lancets (onetouch ultrasoft) lancets Test once daily or as directed 100 each 3    lisinopril (ZESTRIL) 40 mg tablet Take 1 tablet (40 mg total) by mouth daily 90 tablet 3    metFORMIN (GLUCOPHAGE-XR) 500 mg 24 hr tablet 4 TABLETS DAILY 360 tablet 3    metoprolol succinate (TOPROL-XL) 25 mg 24 hr tablet 1 and1/2 tablets  tablet 3    montelukast (SINGULAIR) 10 mg tablet Take 1 tablet (10 mg total) by mouth daily at bedtime 90 tablet 3    omega-3-acid ethyl esters (LOVAZA) 1 g capsule Take 1 capsule (1 g total) by mouth 2 (two) times a day 180 capsule 3    sildenafil (REVATIO) 20 mg tablet TAKE 2 TABLETS BY MOUTH 1 HOUR BEFORE INTERCOURSE 30 tablet 0    valACYclovir (VALTREX) 1,000 mg tablet Take 1 tablet (1,000 mg total) by mouth 3 (three) times a day for 7 days 21 tablet 0     No current facility-administered medications for this visit  No Known Allergies    Review of Systems   Constitutional: Positive for chills and fatigue  HENT: Positive for sore throat  Negative for congestion, postnasal drip, rhinorrhea and trouble swallowing  Eyes: Negative  Negative for visual disturbance  Respiratory: Negative  Negative for choking and shortness of breath  Cardiovascular: Negative  Negative for chest pain  Gastrointestinal: Negative  Endocrine: Negative  Genitourinary: Negative      Musculoskeletal: Positive for myalgias  Negative for arthralgias, back pain and neck pain  Skin: Negative  Neurological: Negative for dizziness and headaches  Psychiatric/Behavioral: Negative  Objective: There were no vitals filed for this visit  Physical Exam  Vitals reviewed  Constitutional:       General: He is not in acute distress  Appearance: He is well-developed  HENT:      Head: Normocephalic  Eyes:      Pupils: Pupils are equal, round, and reactive to light  Pulmonary:      Effort: Pulmonary effort is normal    Musculoskeletal:      Cervical back: Normal range of motion  Neurological:      Mental Status: He is oriented to person, place, and time  Psychiatric:         Mood and Affect: Mood normal          Behavior: Behavior normal          Thought Content: Thought content normal          Judgment: Judgment normal          VIRTUAL VISIT DISCLAIMER    Karyn Moore verbally agrees to participate in [employer]  Pt is aware that [employer] could be limited without vital signs or the ability to perform a full hands-on physical exam  Alvaro Putnam understands he or the provider may request at any time to terminate the video visit and request the patient to seek care or treatment in person

## 2022-01-10 NOTE — PROGRESS NOTES
COVID-19 Outpatient Progress Note    Assessment/Plan:    Problem List Items Addressed This Visit     None         Disposition:     Recommended patient to come to the office to test for COVID-19  Patient is fully vaccinated and I recommended self quarantine for 5 days followed by strict mask use for an additional 5 days  If patient were to develop symptoms, they should immediately self isolate and call our office for further guidance  Patient began with symptoms of body aches, sore throat and chills on January 8th  His wife is also ill  He would like to have a COVID swab done today  He is aware that this may take anywhere from 24-48 hours to be final   He has been advised that he should continue to quarantine at home from 5 days of symptom onset  He can treat his symptoms with over-the-counter medications  I have spent 15 minutes directly with the patient  Greater than 50% of this time was spent in counseling/coordination of care regarding: risks and benefits of treatment options, instructions for management and impressions  Encounter provider MILADY Hoang    Provider located at [address] [place] [address]    Recent Visits  No visits were found meeting these conditions  Showing recent visits within past 7 days and meeting all other requirements  Today's Visits  Date Type Provider Dept   01/10/22 Telemedicine Adri Her, [address] today's visits and meeting all other requirements  Future Appointments  No visits were found meeting these conditions  Showing future appointments within next 150 days and meeting all other requirements     This virtual check-in was done via [facility] and patient was informed that this is a secure, HIPAA-compliant platform  He agrees to proceed      Patient agrees to participate in a virtual check in via telephone or video visit instead of presenting to the office to address urgent/immediate medical needs  Patient is aware this is a billable service  After connecting through [facility], the patient was identified by name and date of birth  Niya Dunham was informed that this was a telemedicine visit and that the exam was being conducted confidentially over secure lines  My office door was closed  No one else was in the room  Niya Dunham acknowledged consent and understanding of privacy and security of the telemedicine visit  I informed the patient that I have reviewed his record in Epic and presented the opportunity for him to ask any questions regarding the visit today  The patient agreed to participate  Verification of patient location:  Patient is located in the following state in which I hold an active license: [place]    Subjective: Niya Dunham is a 57 y o  male who is concerned about COVID-19  Patient's symptoms include chills, fatigue, sore throat and myalgias  Patient denies congestion, rhinorrhea, shortness of breath and headaches       - Date of symptom onset: 1/8/2022      COVID-19 vaccination status: Fully vaccinated (primary series)    Exposure:   Contact with a person who is under investigation (PUI) for or who is positive for COVID-19 within the last 14 days?: No    Hospitalized recently for fever and/or lower respiratory symptoms?: No      Currently a healthcare worker that is involved in direct patient care?: No      Works in a special setting where the risk of COVID-19 transmission may be high? (this may include long-term care, correctional and [facility] facilities; homeless shelters; assisted-living facilities and group homes ): No      Resident in a special setting where the risk of COVID-19 transmission may be high? (this may include long-term care, correctional and [facility] facilities; homeless shelters; assisted-living facilities and group homes ): No      No results found for: Shemar Narvaez, [address] [facility], Giorgi Hannah, [address]  Past Medical History:   Diagnosis Date    Asthma     Chronic kidney disease     Diabetes mellitus ([address] )     Hypertension     Kidney stone      Past Surgical History:   Procedure Laterality Date    CHOLECYSTECTOMY      ROTATOR CUFF REPAIR      SHOULDER SURGERY  2015     Current Outpatient Medications   Medication Sig Dispense Refill    amLODIPine (NORVASC) 5 mg tablet Take 1 tablet (5 mg total) by mouth daily 30 tablet 3    aspirin (ECOTRIN LOW STRENGTH) 81 mg EC tablet Take 81 mg by mouth daily      atorvastatin (LIPITOR) 20 mg tablet Take 1 tablet (20 mg total) by mouth daily 90 tablet 3    glucose blood test strip Test once daily or as directed 100 each 2    Lancets (onetouch ultrasoft) lancets Test once daily or as directed 100 each 3    lisinopril (ZESTRIL) 40 mg tablet Take 1 tablet (40 mg total) by mouth daily 90 tablet 3    metFORMIN (GLUCOPHAGE-XR) 500 mg 24 hr tablet 4 TABLETS DAILY 360 tablet 3    metoprolol succinate (TOPROL-XL) 25 mg 24 hr tablet 1 and1/2 tablets  tablet 3    montelukast (SINGULAIR) 10 mg tablet Take 1 tablet (10 mg total) by mouth daily at bedtime 90 tablet 3    omega-3-acid ethyl esters (LOVAZA) 1 g capsule Take 1 capsule (1 g total) by mouth 2 (two) times a day 180 capsule 3    sildenafil (REVATIO) 20 mg tablet TAKE 2 TABLETS BY MOUTH 1 HOUR BEFORE INTERCOURSE 30 tablet 0    valACYclovir (VALTREX) 1,000 mg tablet Take 1 tablet (1,000 mg total) by mouth 3 (three) times a day for 7 days 21 tablet 0     No current facility-administered medications for this visit  No Known Allergies    Review of Systems   Constitutional: Positive for chills and fatigue  HENT: Positive for sore throat  Negative for congestion, postnasal drip, rhinorrhea and trouble swallowing  Eyes: Negative  Negative for visual disturbance  Respiratory: Negative  Negative for choking and shortness of breath  Cardiovascular: Negative  Negative for chest pain  Gastrointestinal: Negative  Endocrine: Negative  Genitourinary: Negative  Musculoskeletal: Positive for myalgias  Negative for arthralgias, back pain and neck pain  Skin: Negative  Neurological: Negative for dizziness and headaches  Psychiatric/Behavioral: Negative  Objective: There were no vitals filed for this visit  Physical Exam  Vitals reviewed  Constitutional:       General: He is not in acute distress  Appearance: Normal appearance  He is well-developed  HENT:      Head: Normocephalic  Eyes:      Pupils: Pupils are equal, round, and reactive to light  Pulmonary:      Effort: Pulmonary effort is normal    Musculoskeletal:      Cervical back: Normal range of motion  Neurological:      Mental Status: He is alert and oriented to person, place, and time  Psychiatric:         Mood and Affect: Mood normal          Behavior: Behavior normal          Thought Content: Thought content normal          Judgment: Judgment normal          VIRTUAL VISIT DISCLAIMER    Maylulu Annees verbally agrees to participate in [employer]  Pt is aware that [employer] could be limited without vital signs or the ability to perform a full hands-on physical exam  Alvaro Hartmann understands he or the provider may request at any time to terminate the video visit and request the patient to seek care or treatment in person

## 2022-01-11 ENCOUNTER — TELEPHONE (OUTPATIENT)
Dept: INTERNAL MEDICINE CLINIC | Facility: CLINIC | Age: 62
End: 2022-01-11

## 2022-01-11 LAB — SARS-COV-2 RNA RESP QL NAA+PROBE: POSITIVE

## 2022-01-11 NOTE — TELEPHONE ENCOUNTER
I contacted the patient regarding his positive COVID results  He continues with mild symptoms  Cdc guidelines for quarantine were discussed with the patient  He will  Continue with over-the-counter medications for symptoms

## 2022-02-07 NOTE — PROGRESS NOTES
2/8/2022      Chief Complaint   Patient presents with    Benign Prostatic Hypertrophy    Hx of Family Prostate Cancer     Assessment and Plan    57 y o  male new patient    1  BPH with weak urinary stream  - Not bothersome at this time  - Demonstrating adequate bladder emptying with PVR of 7 mL  - Could consider Flomax if needed in the future    2  Prostate cancer screening  3  Family history of prostate cancer (identical twin brother and father)   - Current PSA has remained relatively stable  PSA from 11/26/21 was 3 6  Previous PSA 3 0 (10/13/20) and 3 5 (3/7/19)   - ELENA normal    - Recommend rechecking PSA 6 months from prior in May 2022  Given his strong family history would have low threshold to proceed with prostate biopsy  History of Present Illness  Stephanie Guo is a 57 y o  male here for prostate cancer screening and BPH  Patient has strong family history of prostate cancer in his father as well as his identical brother who was diagnosed with prostate cancer recently with a PSA of 4 3  Patient's PSA has been within normal range most recently 3 6 on 11/26/2021  Previously PSA was 3 0 in 2020 and 3 5 in 2019  He reports his brother is currently being managed on active surveillance  He reports his father was also managed with watchful waiting and never had treatment for the prostate cancer  He passed away from other causes  Patient reports some minor weak urinary stream, otherwise denies any urinary complaints  Denies any prior  surgical manipulation  Denies any other family history of  malignancy  Patient has history of diabetes, asthma, hypertension, heart disease, hyperlipidemia  Denies smoking  Urine dip trace protein  Negative for blood, leukocytes, or nitrites  AUA SYMPTOM SCORE      Most Recent Value   AUA SYMPTOM SCORE    How often have you had a sensation of not emptying your bladder completely after you finished urinating?  1   How often have you had to urinate again less than two hours after you finished urinating? 1   How often have you found you stopped and started again several times when you urinate? 1   How often have you found it difficult to postpone urination? 0   How often have you had a weak urinary stream? 4   How often have you had to push or strain to begin urination? 0   How many times did you most typically get up to urinate from the time you went to bed at night until the time you got up in the morning? 0   Quality of Life: If you were to spend the rest of your life with your urinary condition just the way it is now, how would you feel about that? 0   AUA SYMPTOM SCORE 7          Review of Systems   Constitutional: Negative for chills and fever  Respiratory: Negative for shortness of breath  Cardiovascular: Negative for chest pain  Gastrointestinal: Negative for abdominal pain  Genitourinary: Negative for difficulty urinating, dysuria, flank pain, frequency, hematuria and urgency  Neurological: Negative for dizziness           Past Medical History  Past Medical History:   Diagnosis Date    Asthma     Benign prostatic hyperplasia     Chronic kidney disease     [facility] [address] [facility]     Family history of prostate cancer     Hypertension     Kidney stone        Past Social History  Past Surgical History:   Procedure Laterality Date    CHOLECYSTECTOMY      ROTATOR CUFF REPAIR      SHOULDER SURGERY  2015     Social History     Tobacco Use   Smoking Status Never Smoker   Smokeless Tobacco Never Used       Past Family History  Family History   Problem Relation Age of Onset    Hypertension Mother     Diabetes Mother     Cancer Father     Hypertension Father     Prostate cancer Father     Hypertension Brother        Past Social history  Social History     Socioeconomic History    Marital status: /Civil Union     Spouse name: Not on file    Number of children: Not on file    Years of education: Not on file    Highest education level: Not on file   Occupational History    Not on file   Tobacco Use    Smoking status: Never Smoker    Smokeless tobacco: Never Used   Vaping Use    Vaping Use: Never used   Substance and Sexual Activity    Alcohol use: Yes     Comment: sOCIAL    Drug use: Never    Sexual activity: Yes     Partners: Female   Other Topics Concern    Not on file   Social History Narrative    Not on file     Social Determinants of Health     Financial Resource Strain: Not on file   Food Insecurity: Not on file   Transportation Needs: Not on file   Physical Activity: Not on file   Stress: Not on file   Social Connections: Not on file   Intimate Partner Violence: Not on file   Housing Stability: Not on file       Current Medications  Current Outpatient Medications   Medication Sig Dispense Refill    amLODIPine (NORVASC) 5 mg tablet Take 1 tablet (5 mg total) by mouth daily 30 tablet 3    aspirin (ECOTRIN LOW STRENGTH) 81 mg EC tablet Take 81 mg by mouth daily      atorvastatin (LIPITOR) 20 mg tablet Take 1 tablet (20 mg total) by mouth daily 90 tablet 3    glucose blood test strip Test once daily or as directed 100 each 2    Lancets (onetouch ultrasoft) lancets Test once daily or as directed 100 each 3    lisinopril (ZESTRIL) 40 mg tablet Take 1 tablet (40 mg total) by mouth daily 90 tablet 3    metFORMIN (GLUCOPHAGE-XR) 500 mg 24 hr tablet 4 TABLETS DAILY 360 tablet 3    metoprolol succinate (TOPROL-XL) 25 mg 24 hr tablet 1 and1/2 tablets  tablet 3    montelukast (SINGULAIR) 10 mg tablet Take 1 tablet (10 mg total) by mouth daily at bedtime 90 tablet 3    omega-3-acid ethyl esters (LOVAZA) 1 g capsule Take 1 capsule (1 g total) by mouth 2 (two) times a day 180 capsule 3    sildenafil (REVATIO) 20 mg tablet TAKE 2 TABLETS BY MOUTH 1 HOUR BEFORE INTERCOURSE 30 tablet 0    valACYclovir (VALTREX) 1,000 mg tablet Take 1 tablet (1,000 mg total) by mouth 3 (three) times a day for 7 days 21 tablet 0     No current facility-administered medications for this visit  Allergies  No Known Allergies      The following portions of the patient's history were reviewed and updated as appropriate: allergies, current medications, past medical history, past social history, past surgical history and problem list       Vitals  Vitals:    02/08/22 1125   BP: 124/74   Pulse: 68   Weight: 97 5 kg (215 lb)   Height: 5' 9" (1 753 m)           Physical Exam  Physical Exam  Constitutional:       Appearance: Normal appearance  He is obese  HENT:      Head: Normocephalic and atraumatic  Right Ear: External ear normal       Left Ear: External ear normal    Eyes:      General: No scleral icterus  Conjunctiva/sclera: Conjunctivae normal    Cardiovascular:      Pulses: Normal pulses  Pulmonary:      Effort: Pulmonary effort is normal    Genitourinary:     Comments: Prostate approximately 35-40 g without nodules or tenderness  Musculoskeletal:         General: Normal range of motion  Cervical back: Normal range of motion  Skin:     General: Skin is warm and dry  Neurological:      General: No focal deficit present  Mental Status: He is alert and oriented to person, place, and time  Psychiatric:         Mood and Affect: Mood normal          Behavior: Behavior normal          Thought Content:  Thought content normal          Judgment: Judgment normal            Results  Recent Results (from the past 1 hour(s))   POCT urine dip    Collection Time: 02/08/22 11:30 AM   Result Value Ref Range    LEUKOCYTE ESTERASE,UA -     NITRITE,UA -     SL AMB POCT UROBILINOGEN 0 2     POCT URINE PROTEIN trace      PH,UA 6 0     BLOOD,UA -     SPECIFIC GRAVITY,UA 1 030     KETONES,UA -     BILIRUBIN,UA -     GLUCOSE, UA -      COLOR,UA yellow     CLARITY,UA clear    POCT Measure PVR    Collection Time: 02/08/22 11:37 AM   Result Value Ref Range    POST-VOID RESIDUAL VOLUME, ML POC 7 mL   ]  Lab Results   Component Value Date    PSA 3 6 11/26/2021    PSA 3 0 10/13/2020    PSA 3 5 03/07/2019     Lab Results   Component Value Date    CALCIUM 8 8 03/10/2019    K 4 6 11/26/2021    CO2 24 11/26/2021     11/26/2021    BUN 17 11/26/2021    CREATININE 1 20 11/26/2021     Lab Results   Component Value Date    WBC 7 9 11/26/2021    HGB 13 7 11/26/2021    HCT 40 2 11/26/2021    MCV 90 11/26/2021     11/26/2021           Orders  Orders Placed This Encounter   Procedures    POCT Measure PVR    POCT urine dip       Tanner Niño PA-C

## 2022-02-08 ENCOUNTER — OFFICE VISIT (OUTPATIENT)
Dept: UROLOGY | Facility: CLINIC | Age: 62
End: 2022-02-08
Payer: COMMERCIAL

## 2022-02-08 VITALS
BODY MASS INDEX: 31.84 KG/M2 | WEIGHT: 215 LBS | HEART RATE: 68 BPM | SYSTOLIC BLOOD PRESSURE: 124 MMHG | DIASTOLIC BLOOD PRESSURE: 74 MMHG | HEIGHT: 69 IN

## 2022-02-08 DIAGNOSIS — Z12.5 PROSTATE CANCER SCREENING: ICD-10-CM

## 2022-02-08 DIAGNOSIS — N40.1 BENIGN PROSTATIC HYPERPLASIA WITH INCOMPLETE BLADDER EMPTYING: ICD-10-CM

## 2022-02-08 DIAGNOSIS — Z80.42 FAMILY HISTORY OF PROSTATE CANCER: ICD-10-CM

## 2022-02-08 DIAGNOSIS — N40.1 BENIGN PROSTATIC HYPERPLASIA WITH LOWER URINARY TRACT SYMPTOMS, SYMPTOM DETAILS UNSPECIFIED: Primary | ICD-10-CM

## 2022-02-08 DIAGNOSIS — R39.14 BENIGN PROSTATIC HYPERPLASIA WITH INCOMPLETE BLADDER EMPTYING: ICD-10-CM

## 2022-02-08 LAB
POST-VOID RESIDUAL VOLUME, ML POC: 7 ML
SL AMB  POCT GLUCOSE, UA: NORMAL
SL AMB LEUKOCYTE ESTERASE,UA: NORMAL
SL AMB POCT BILIRUBIN,UA: NORMAL
SL AMB POCT BLOOD,UA: NORMAL
SL AMB POCT CLARITY,UA: CLEAR
SL AMB POCT COLOR,UA: YELLOW
SL AMB POCT KETONES,UA: NORMAL
SL AMB POCT NITRITE,UA: NORMAL
SL AMB POCT PH,UA: 6
SL AMB POCT SPECIFIC GRAVITY,UA: 1.03
SL AMB POCT URINE PROTEIN: NORMAL
SL AMB POCT UROBILINOGEN: 0.2

## 2022-02-08 PROCEDURE — 51798 US URINE CAPACITY MEASURE: CPT | Performed by: PHYSICIAN ASSISTANT

## 2022-02-08 PROCEDURE — 3008F BODY MASS INDEX DOCD: CPT | Performed by: PHYSICIAN ASSISTANT

## 2022-02-08 PROCEDURE — 99203 OFFICE O/P NEW LOW 30 MIN: CPT | Performed by: PHYSICIAN ASSISTANT

## 2022-02-08 PROCEDURE — 1036F TOBACCO NON-USER: CPT | Performed by: PHYSICIAN ASSISTANT

## 2022-02-08 PROCEDURE — 81002 URINALYSIS NONAUTO W/O SCOPE: CPT | Performed by: PHYSICIAN ASSISTANT

## 2022-02-08 PROCEDURE — 3061F NEG MICROALBUMINURIA REV: CPT | Performed by: PHYSICIAN ASSISTANT

## 2022-02-28 DIAGNOSIS — I10 ESSENTIAL HYPERTENSION: ICD-10-CM

## 2022-02-28 RX ORDER — SILDENAFIL CITRATE 20 MG/1
TABLET ORAL
Qty: 30 TABLET | Refills: 0 | Status: SHIPPED | OUTPATIENT
Start: 2022-02-28

## 2022-02-28 RX ORDER — METOPROLOL SUCCINATE 25 MG/1
TABLET, EXTENDED RELEASE ORAL
Qty: 135 TABLET | Refills: 0 | Status: SHIPPED | OUTPATIENT
Start: 2022-02-28 | End: 2022-07-01 | Stop reason: SDUPTHER

## 2022-03-07 DIAGNOSIS — E11.69 TYPE 2 DIABETES MELLITUS WITH OTHER SPECIFIED COMPLICATION, WITHOUT LONG-TERM CURRENT USE OF INSULIN (HCC): ICD-10-CM

## 2022-03-08 RX ORDER — METFORMIN HYDROCHLORIDE 500 MG/1
TABLET, EXTENDED RELEASE ORAL
Qty: 120 TABLET | Refills: 0 | Status: SHIPPED | OUTPATIENT
Start: 2022-03-08 | End: 2022-04-18 | Stop reason: SDUPTHER

## 2022-04-18 ENCOUNTER — OFFICE VISIT (OUTPATIENT)
Dept: INTERNAL MEDICINE CLINIC | Facility: CLINIC | Age: 62
End: 2022-04-18
Payer: COMMERCIAL

## 2022-04-18 VITALS
HEART RATE: 63 BPM | TEMPERATURE: 96.1 F | HEIGHT: 60 IN | SYSTOLIC BLOOD PRESSURE: 132 MMHG | BODY MASS INDEX: 43.31 KG/M2 | RESPIRATION RATE: 16 BRPM | OXYGEN SATURATION: 96 % | DIASTOLIC BLOOD PRESSURE: 74 MMHG | WEIGHT: 220.6 LBS

## 2022-04-18 DIAGNOSIS — E78.2 MIXED HYPERLIPIDEMIA: ICD-10-CM

## 2022-04-18 DIAGNOSIS — I10 ESSENTIAL HYPERTENSION: ICD-10-CM

## 2022-04-18 DIAGNOSIS — I11.9 HYPERTENSIVE HEART DISEASE WITHOUT HEART FAILURE: ICD-10-CM

## 2022-04-18 DIAGNOSIS — E11.69 TYPE 2 DIABETES MELLITUS WITH OTHER SPECIFIED COMPLICATION, WITHOUT LONG-TERM CURRENT USE OF INSULIN (HCC): Primary | ICD-10-CM

## 2022-04-18 LAB — SL AMB POCT HEMOGLOBIN AIC: 8.1 (ref ?–6.5)

## 2022-04-18 PROCEDURE — 3008F BODY MASS INDEX DOCD: CPT | Performed by: INTERNAL MEDICINE

## 2022-04-18 PROCEDURE — 1036F TOBACCO NON-USER: CPT | Performed by: INTERNAL MEDICINE

## 2022-04-18 PROCEDURE — 83036 HEMOGLOBIN GLYCOSYLATED A1C: CPT | Performed by: INTERNAL MEDICINE

## 2022-04-18 PROCEDURE — 3075F SYST BP GE 130 - 139MM HG: CPT | Performed by: INTERNAL MEDICINE

## 2022-04-18 PROCEDURE — 99213 OFFICE O/P EST LOW 20 MIN: CPT | Performed by: INTERNAL MEDICINE

## 2022-04-18 PROCEDURE — 3078F DIAST BP <80 MM HG: CPT | Performed by: INTERNAL MEDICINE

## 2022-04-18 PROCEDURE — 3725F SCREEN DEPRESSION PERFORMED: CPT | Performed by: INTERNAL MEDICINE

## 2022-04-18 PROCEDURE — 3052F HG A1C>EQUAL 8.0%<EQUAL 9.0%: CPT | Performed by: INTERNAL MEDICINE

## 2022-04-18 RX ORDER — METFORMIN HYDROCHLORIDE 500 MG/1
TABLET, EXTENDED RELEASE ORAL
Qty: 360 TABLET | Refills: 3 | Status: SHIPPED | OUTPATIENT
Start: 2022-04-18 | End: 2022-04-18

## 2022-04-18 RX ORDER — METFORMIN HYDROCHLORIDE 500 MG/1
1000 TABLET, EXTENDED RELEASE ORAL
Qty: 360 TABLET | Refills: 3
Start: 2022-04-18 | End: 2022-05-23 | Stop reason: SDUPTHER

## 2022-04-18 RX ORDER — OMEGA-3-ACID ETHYL ESTERS 1 G/1
1 CAPSULE, LIQUID FILLED ORAL 2 TIMES DAILY
Qty: 180 CAPSULE | Refills: 3 | Status: SHIPPED | OUTPATIENT
Start: 2022-04-18 | End: 2022-05-16 | Stop reason: SDUPTHER

## 2022-04-18 RX ORDER — AMLODIPINE BESYLATE 5 MG/1
5 TABLET ORAL DAILY
Qty: 90 TABLET | Refills: 3 | Status: SHIPPED | OUTPATIENT
Start: 2022-04-18

## 2022-04-18 RX ORDER — ATORVASTATIN CALCIUM 20 MG/1
20 TABLET, FILM COATED ORAL DAILY
Qty: 90 TABLET | Refills: 3 | Status: SHIPPED | OUTPATIENT
Start: 2022-04-18

## 2022-04-18 NOTE — PROGRESS NOTES
Assessment/Plan:       Diagnoses and all orders for this visit:    Type 2 diabetes mellitus with other specified complication, without long-term current use of insulin ([facility])  -     IRIS Diabetic eye exam  -     POCT hemoglobin A1c  -     metFORMIN (GLUCOPHAGE-XR) 500 mg 24 hr tablet; TAKE 4 TABLETS BY MOUTH ONCE DAILY  -     atorvastatin (LIPITOR) 20 mg tablet; Take 1 tablet (20 mg total) by mouth daily  -     omega-3-acid ethyl esters (LOVAZA) 1 g capsule; Take 1 capsule (1 g total) by mouth 2 (two) times a day    Essential hypertension  -     amLODIPine (NORVASC) 5 mg tablet; Take 1 tablet (5 mg total) by mouth daily    Mixed hyperlipidemia  -     atorvastatin (LIPITOR) 20 mg tablet; Take 1 tablet (20 mg total) by mouth daily    Hypertensive heart disease without heart failure                Subjective:      Patient ID: Aditya Walton is a 57 y o  male  Hypertensive diabetic and dyslipidemic  Blood sugar formally was well controlled but now the blood sugar has been over 8 on the A1c 2 readings in a row  Blood pressure is controlled  Lipids are controlled     Family history of prostate cancer  Father had prostate cancer  Now his identical twin brother has prostate cancer diagnosed after PSA of 4 03  His PSA was 3 6 but I sent him to Urology   Colonoscopy done 2019      The following portions of the patient's history were reviewed and updated as appropriate:   He has a past medical history of Asthma, Benign prostatic hyperplasia, Chronic kidney disease, Diabetes mellitus ([address] ), Family history of prostate cancer, Hypertension, and Kidney stone  ,  does not have any pertinent problems on file  ,   has a past surgical history that includes Shoulder surgery (2015); Cholecystectomy; and Rotator cuff repair  ,  family history includes Cancer in his father; Diabetes in his mother; Hypertension in his brother, father, and mother; Prostate cancer in his father  ,   reports that he has never smoked   He has never used smokeless tobacco  He reports current alcohol use  He reports that he does not use drugs  ,  has No Known Allergies     Current Outpatient Medications   Medication Sig Dispense Refill    amLODIPine (NORVASC) 5 mg tablet Take 1 tablet (5 mg total) by mouth daily 90 tablet 3    aspirin (ECOTRIN LOW STRENGTH) 81 mg EC tablet Take 81 mg by mouth daily      atorvastatin (LIPITOR) 20 mg tablet Take 1 tablet (20 mg total) by mouth daily 90 tablet 3    glucose blood test strip Test once daily or as directed 100 each 2    Lancets (onetouch ultrasoft) lancets Test once daily or as directed 100 each 3    lisinopril (ZESTRIL) 40 mg tablet Take 1 tablet (40 mg total) by mouth daily 90 tablet 3    metFORMIN (GLUCOPHAGE-XR) 500 mg 24 hr tablet TAKE 4 TABLETS BY MOUTH ONCE DAILY 360 tablet 3    metoprolol succinate (TOPROL-XL) 25 mg 24 hr tablet TAKE 1 & 1/2 (ONE & ONE-HALF) TABLETS BY MOUTH AT BEDTIME 135 tablet 0    montelukast (SINGULAIR) 10 mg tablet Take 1 tablet (10 mg total) by mouth daily at bedtime 90 tablet 3    omega-3-acid ethyl esters (LOVAZA) 1 g capsule Take 1 capsule (1 g total) by mouth 2 (two) times a day 180 capsule 3    sildenafil (REVATIO) 20 mg tablet TAKE 2 TABLETS BY MOUTH 1 HOUR BEFORE INTERCOURSE 30 tablet 0    valACYclovir (VALTREX) 1,000 mg tablet Take 1 tablet (1,000 mg total) by mouth 3 (three) times a day for 7 days 21 tablet 0     No current facility-administered medications for this visit  Review of Systems   Constitutional: Negative for chills and fever  HENT: Negative for sore throat and trouble swallowing  Eyes: Negative for pain  Respiratory: Negative for cough and shortness of breath  Cardiovascular: Negative for chest pain and palpitations  Gastrointestinal: Negative for abdominal pain, blood in stool, diarrhea, nausea and vomiting  Endocrine: Negative for cold intolerance and heat intolerance  Genitourinary: Negative for dysuria, frequency and testicular pain  Musculoskeletal: Negative for joint swelling  Allergic/Immunologic: Negative for immunocompromised state  Neurological: Negative for dizziness, syncope and headaches  Hematological: Negative for adenopathy  Does not bruise/bleed easily  Psychiatric/Behavioral: Negative for dysphoric mood  The patient is not nervous/anxious  Objective:  Vitals:    04/18/22 1345   BP: 132/74   Pulse: 63   Resp: 16   Temp: (!) 96 1 °F (35 6 °C)   SpO2: 96%      Physical Exam  Constitutional:       Appearance: He is well-developed  HENT:      Head: Normocephalic and atraumatic  Eyes:      General: No scleral icterus  Pupils: Pupils are equal, round, and reactive to light  Neck:      Thyroid: No thyromegaly  Trachea: No tracheal deviation  Cardiovascular:      Rate and Rhythm: Normal rate and regular rhythm  Heart sounds: Normal heart sounds  No murmur heard  No gallop  Pulmonary:      Effort: Pulmonary effort is normal  No respiratory distress  Breath sounds: No wheezing or rales  Abdominal:      General: Bowel sounds are normal       Palpations: Abdomen is soft  Musculoskeletal:         General: No tenderness or deformity  Normal range of motion  Cervical back: Normal range of motion and neck supple  Skin:     General: Skin is warm and dry  Neurological:      Mental Status: He is alert and oriented to person, place, and time  Coordination: Coordination normal       Deep Tendon Reflexes: Reflexes are normal and symmetric  Psychiatric:         Mood and Affect: Mood normal          Judgment: Judgment normal            There are no Patient Instructions on file for this visit

## 2022-04-18 NOTE — PATIENT INSTRUCTIONS
Patient feels well but who is diabetic control is poor   He does have some GI related side effects principally fecal urgency associated with metformin at a higher dose  Recommendation as follows:  Reduce metformin to  500 mg twice a day  Begin Farxiga 5 mg daily   Diabetic education referral when he comes back from his trip

## 2022-05-16 DIAGNOSIS — E11.69 TYPE 2 DIABETES MELLITUS WITH OTHER SPECIFIED COMPLICATION, WITHOUT LONG-TERM CURRENT USE OF INSULIN (HCC): ICD-10-CM

## 2022-05-16 RX ORDER — OMEGA-3-ACID ETHYL ESTERS 1 G/1
1 CAPSULE, LIQUID FILLED ORAL 2 TIMES DAILY
Qty: 180 CAPSULE | Refills: 0 | Status: SHIPPED | OUTPATIENT
Start: 2022-05-16

## 2022-05-17 ENCOUNTER — OFFICE VISIT (OUTPATIENT)
Dept: CARDIOLOGY CLINIC | Facility: CLINIC | Age: 62
End: 2022-05-17
Payer: COMMERCIAL

## 2022-05-17 VITALS
HEIGHT: 69 IN | DIASTOLIC BLOOD PRESSURE: 78 MMHG | HEART RATE: 74 BPM | SYSTOLIC BLOOD PRESSURE: 140 MMHG | BODY MASS INDEX: 31.84 KG/M2 | WEIGHT: 215 LBS

## 2022-05-17 DIAGNOSIS — R07.89 CHEST PRESSURE: Primary | ICD-10-CM

## 2022-05-17 DIAGNOSIS — E78.2 MIXED HYPERLIPIDEMIA: ICD-10-CM

## 2022-05-17 DIAGNOSIS — I11.9 HYPERTENSIVE HEART DISEASE WITHOUT HEART FAILURE: ICD-10-CM

## 2022-05-17 DIAGNOSIS — I10 ESSENTIAL HYPERTENSION: ICD-10-CM

## 2022-05-17 PROCEDURE — 3077F SYST BP >= 140 MM HG: CPT | Performed by: PHYSICIAN ASSISTANT

## 2022-05-17 PROCEDURE — 93000 ELECTROCARDIOGRAM COMPLETE: CPT | Performed by: PHYSICIAN ASSISTANT

## 2022-05-17 PROCEDURE — 99213 OFFICE O/P EST LOW 20 MIN: CPT | Performed by: PHYSICIAN ASSISTANT

## 2022-05-17 PROCEDURE — 3008F BODY MASS INDEX DOCD: CPT | Performed by: PHYSICIAN ASSISTANT

## 2022-05-17 PROCEDURE — 1036F TOBACCO NON-USER: CPT | Performed by: PHYSICIAN ASSISTANT

## 2022-05-17 PROCEDURE — 3078F DIAST BP <80 MM HG: CPT | Performed by: PHYSICIAN ASSISTANT

## 2022-05-17 NOTE — ASSESSMENT & PLAN NOTE
Moderately controlled on metoprolol lisinopril and Norvasc  Patient states his BP is usually controlled at home  He did not take his medications last night

## 2022-05-17 NOTE — PROGRESS NOTES
[facility]   Outpatient Note  Cheryle Mullet  1960  [id number]  [facility], [facility] [address]  [facility] [id number]  Krystyna Magallanes[address]    Cheryle Mullet is a 57 y o  male    Assessment and Plan:   Hypertensive heart disease without heart failure  Moderately controlled on metoprolol lisinopril and Norvasc  Patient states his BP is usually controlled at home  He did not take his medications last night    Mixed hyperlipidemia  Tolerating low-dose statin therapy  Continue atorvastatin 20 mg daily     Additional Plan:   No Medication changes made or testing ordered today  Available lab and test results are reviewed with the patient as noted  Return visit will be in 1 year or earlier if there are problems  The patient is encouraged to call in the meantime if there are questions or concerns  Subjective: The patient is seen in the office today for a follow up regarding HTN, HLD and DMII  From a cardiac perspective, he is doing well without complaints of chest pain or exertional dyspnea  He has no palpitations syncope or near syncope, and denies edema orthopnea or PND  He does not complain of TIA or CVA symptoms                 Social History  Social History     Tobacco Use   Smoking Status Never Smoker   Smokeless Tobacco Never Used   ,   Social History     Substance and Sexual Activity   Alcohol Use Yes    Comment: sOCIAL   ,   Social History     Substance and Sexual Activity   Drug Use Never     Family History   Problem Relation Age of Onset    Hypertension Mother     Diabetes Mother     Cancer Father     Hypertension Father     Prostate cancer Father     Hypertension Brother        Medical and Surgical History  Past Medical History:   Diagnosis Date    Asthma     Benign prostatic hyperplasia     Chronic kidney disease     Diabetes mellitus ([address] )     Family history of prostate cancer     Hypertension     Kidney stone     Mixed hyperlipidemia      Past Surgical History:   Procedure Laterality Date    CHOLECYSTECTOMY      ROTATOR CUFF REPAIR      SHOULDER SURGERY  2015         Current Outpatient Medications:     amLODIPine (NORVASC) 5 mg tablet, Take 1 tablet (5 mg total) by mouth daily, Disp: 90 tablet, Rfl: 3    aspirin (ECOTRIN LOW STRENGTH) 81 mg EC tablet, Take 81 mg by mouth daily, Disp: , Rfl:     atorvastatin (LIPITOR) 20 mg tablet, Take 1 tablet (20 mg total) by mouth daily, Disp: 90 tablet, Rfl: 3    Dapagliflozin Propanediol (Farxiga) 5 MG TABS, Take 1 tablet (5 mg total) by mouth in the morning, Disp: 90 tablet, Rfl: 1    glucose blood test strip, Test once daily or as directed, Disp: 100 each, Rfl: 2    Lancets (onetouch ultrasoft) lancets, Test once daily or as directed, Disp: 100 each, Rfl: 3    lisinopril (ZESTRIL) 40 mg tablet, Take 1 tablet (40 mg total) by mouth daily, Disp: 90 tablet, Rfl: 3    metFORMIN (GLUCOPHAGE-XR) 500 mg 24 hr tablet, Take 2 tablets (1,000 mg total) by mouth daily with breakfast TAKE 4 TABLETS BY MOUTH ONCE DAILY, Disp: 360 tablet, Rfl: 3    metoprolol succinate (TOPROL-XL) 25 mg 24 hr tablet, TAKE 1 & 1/2 (ONE & ONE-HALF) TABLETS BY MOUTH AT BEDTIME, Disp: 135 tablet, Rfl: 0    montelukast (SINGULAIR) 10 mg tablet, Take 1 tablet (10 mg total) by mouth daily at bedtime, Disp: 90 tablet, Rfl: 3    omega-3-acid ethyl esters (LOVAZA) 1 g capsule, Take 1 capsule (1 g total) by mouth in the morning and 1 capsule (1 g total) in the evening , Disp: 180 capsule, Rfl: 0    sildenafil (REVATIO) 20 mg tablet, TAKE 2 TABLETS BY MOUTH 1 HOUR BEFORE INTERCOURSE, Disp: 30 tablet, Rfl: 0    valACYclovir (VALTREX) 1,000 mg tablet, Take 1 tablet (1,000 mg total) by mouth 3 (three) times a day for 7 days (Patient not taking: Reported on 5/17/2022), Disp: 21 tablet, Rfl: 0  No Known Allergies    Review of Systems   Constitutional: Negative  HENT: Negative  Eyes: Negative  Cardiovascular: Negative for chest pain (left wall pressure when lying on left side), claudication (in upper and lower extremities ), cyanosis, dyspnea on exertion, irregular heartbeat, leg swelling, near-syncope, orthopnea, palpitations, paroxysmal nocturnal dyspnea and syncope  Respiratory: Negative  Negative for cough, hemoptysis, shortness of breath, sleep disturbances due to breathing, snoring, sputum production and wheezing  Endocrine: Negative  Hematologic/Lymphatic: Negative  Skin: Negative  Musculoskeletal: Negative  Gastrointestinal: Negative  Genitourinary: Negative  Neurological: Positive for paresthesias  Psychiatric/Behavioral: Negative  Allergic/Immunologic: Negative  Objective:   /78   Pulse 74   Ht 5' 9" (1 753 m)   Wt 97 5 kg (215 lb)   BMI 31 75 kg/m²   Physical Exam  Vitals and nursing note reviewed  Constitutional:       Appearance: He is well-developed  HENT:      Head: Normocephalic and atraumatic  Eyes:      General: No scleral icterus  Conjunctiva/sclera: Conjunctivae normal    Neck:      Thyroid: No thyromegaly  Vascular: No JVD  Cardiovascular:      Rate and Rhythm: Normal rate and regular rhythm  Heart sounds: Normal heart sounds  No murmur heard  No friction rub  No gallop  Pulmonary:      Effort: No respiratory distress  Breath sounds: No wheezing or rales  Abdominal:      General: Bowel sounds are normal  There is no distension  Palpations: Abdomen is soft  Tenderness: There is no abdominal tenderness  Comments: Aorta not palpable   Musculoskeletal:         General: No tenderness or deformity  Normal range of motion  Cervical back: Normal range of motion and neck supple  Skin:     General: Skin is warm and dry  Neurological:      Mental Status: He is alert and oriented to person, place, and time     Psychiatric:         Behavior: Behavior normal          Lab Review:   No results found for: CHOL  Lab Results   Component Value Date    HDL 32 (L) 11/26/2021     Lab Results   Component Value Date    LDLCALC 69 11/26/2021     Lab Results   Component Value Date    TRIG 222 (H) 11/26/2021     Results Reviewed     None        Results Reviewed     None        Results Reviewed     None          Recent Cardiovascular Testing:   Holter: 3-14-19: Sinus rhythm with PVC and PACs    Echo 3-11-19: Normal LVF EF 60%, trace MR, TR no WMA    NATI 3-11-19: Normal, No ischemia     ECG Review:   Normal sinus Rhythm

## 2022-05-23 ENCOUNTER — NURSE TRIAGE (OUTPATIENT)
Dept: INTERNAL MEDICINE CLINIC | Facility: CLINIC | Age: 62
End: 2022-05-23

## 2022-05-23 ENCOUNTER — NURSE TRIAGE (OUTPATIENT)
Dept: OTHER | Facility: OTHER | Age: 62
End: 2022-05-23

## 2022-05-23 DIAGNOSIS — E11.69 TYPE 2 DIABETES MELLITUS WITH OTHER SPECIFIED COMPLICATION, WITHOUT LONG-TERM CURRENT USE OF INSULIN (HCC): ICD-10-CM

## 2022-05-23 DIAGNOSIS — E11.59 TYPE 2 DIABETES MELLITUS WITH OTHER CIRCULATORY COMPLICATION, UNSPECIFIED WHETHER LONG TERM INSULIN USE (HCC): Primary | ICD-10-CM

## 2022-05-23 NOTE — TELEPHONE ENCOUNTER
Regarding: medication refill urgent   ----- Message from Manny Kaiser sent at 5/23/2022  4:13 PM EDT -----  "I need a refill of my metFORMIN (GLUCOPHAGE-XR) 500 mg 24 hr tablet I am currently out send to pharmacy on file

## 2022-05-23 NOTE — TELEPHONE ENCOUNTER
Reason for Disposition   [1] Caller requesting a prescription renewal (no refills left), no triage required, AND [2] triager able to renew prescription per department policy    Answer Assessment - Initial Assessment Questions  1  NAME of MEDICATION: "What medicine are you calling about?"      Metformin  2  QUESTION: "What is your question?" (e g , medication refill, side effect)      "I'm all out of my medication"  3  PRESCRIBING HCP: "Who prescribed it?" Reason: if prescribed by specialist, call should be referred to that group        PCP    Protocols used: MEDICATION QUESTION CALL-ADULT-

## 2022-05-24 RX ORDER — METFORMIN HYDROCHLORIDE 500 MG/1
TABLET, EXTENDED RELEASE ORAL
Qty: 360 TABLET | Refills: 0 | Status: SHIPPED | OUTPATIENT
Start: 2022-05-24 | End: 2022-07-05 | Stop reason: SDUPTHER

## 2022-05-26 ENCOUNTER — TELEPHONE (OUTPATIENT)
Dept: UROLOGY | Facility: CLINIC | Age: 62
End: 2022-05-26

## 2022-05-26 NOTE — TELEPHONE ENCOUNTER
Called and spoke to Pt about Labs that needed to get done prior to visit  Pt wanted to reschedule to a later date  Scheduled for June 21st at 3:30 pm  Confirmed time date and location

## 2022-06-18 LAB — PSA SERPL-MCNC: 5 NG/ML (ref 0–4)

## 2022-06-21 ENCOUNTER — OFFICE VISIT (OUTPATIENT)
Dept: UROLOGY | Facility: CLINIC | Age: 62
End: 2022-06-21
Payer: COMMERCIAL

## 2022-06-21 VITALS
SYSTOLIC BLOOD PRESSURE: 124 MMHG | BODY MASS INDEX: 31.55 KG/M2 | HEIGHT: 69 IN | HEART RATE: 69 BPM | DIASTOLIC BLOOD PRESSURE: 76 MMHG | OXYGEN SATURATION: 98 % | WEIGHT: 213 LBS

## 2022-06-21 DIAGNOSIS — Z80.42 FAMILY HISTORY OF PROSTATE CANCER: Primary | ICD-10-CM

## 2022-06-21 DIAGNOSIS — R97.20 ELEVATED PSA: ICD-10-CM

## 2022-06-21 PROCEDURE — 3078F DIAST BP <80 MM HG: CPT | Performed by: PHYSICIAN ASSISTANT

## 2022-06-21 PROCEDURE — 1036F TOBACCO NON-USER: CPT | Performed by: PHYSICIAN ASSISTANT

## 2022-06-21 PROCEDURE — 3008F BODY MASS INDEX DOCD: CPT | Performed by: PHYSICIAN ASSISTANT

## 2022-06-21 PROCEDURE — 99213 OFFICE O/P EST LOW 20 MIN: CPT | Performed by: PHYSICIAN ASSISTANT

## 2022-06-21 PROCEDURE — 3074F SYST BP LT 130 MM HG: CPT | Performed by: PHYSICIAN ASSISTANT

## 2022-06-21 NOTE — PROGRESS NOTES
6/21/2022      Chief Complaint   Patient presents with    Follow-up     Assessment and Plan     1  Prostate cancer screening  2  Recent PSA elevation   3  Family history of prostate cancer (identical twin brother and father)   - Most recent PSA from 6/17/22 has elevated to 5 0  Previous PSAs were within normal range at 3 6 (11/26/21), 3 0 (10/13/20) and 3 5 (3/7/19)   - ELENA negative for nodules  - Reviewed recommendations for proceeding with prostate biopsy given PSA elevation and strong family history or at minimum proceeding with prostate MRI  He would like to think over these options and would prefer to observe his PSA at this time  Will f/u in 3 months with a repeat PSA  I advised him to contact office if he changes his mind  He recently was started on a new diabetes medication and feels this may be the cause of his PSA elevation  I advised that his diabetes medication should not impact his PSA  History of Present Illness  Bridger Oreilly is a 57 y o  male here for follow up evaluation of prostate cancer screening and recent PSA elevation  Recent PSA is elevated at 5 0  Previously PSA was within normal range  He has a strong family history of prostate cancer in his father as well as his identical brother  His brother was diagnosed with prostate cancer with a PSA 4 3 and is currently managed on active surveillance     Additionally he reports his father was also managed with watchful waiting and never had any treatment for his prostate cancer  He denies any new or bothersome urinary complaints  Review of Systems   Constitutional: Negative for chills and fever  Respiratory: Negative for shortness of breath  Cardiovascular: Negative for chest pain  Gastrointestinal: Negative for abdominal pain  Genitourinary: Negative for difficulty urinating, dysuria, flank pain, frequency, hematuria and urgency  Neurological: Negative for dizziness         Past Medical History  Past Medical History: Diagnosis Date    Asthma     Benign prostatic hyperplasia     Chronic kidney disease     Diabetes mellitus ([facility] [address] )     Family history of prostate cancer     Hypertension     Kidney stone     Mixed hyperlipidemia        Past Social History  Past Surgical History:   Procedure Laterality Date    CHOLECYSTECTOMY      ROTATOR CUFF REPAIR      SHOULDER SURGERY  2015     Social History     Tobacco Use   Smoking Status Never Smoker   Smokeless Tobacco Never Used       Past Family History  Family History   Problem Relation Age of Onset    Hypertension Mother     Diabetes Mother     Cancer Father     Hypertension Father     Prostate cancer Father     Hypertension Brother        Past Social history  Social History     Socioeconomic History    Marital status: /Civil Union     Spouse name: Not on file    Number of children: Not on file    Years of education: Not on file    Highest education level: Not on file   Occupational History    Not on file   Tobacco Use    Smoking status: Never Smoker    Smokeless tobacco: Never Used   Vaping Use    Vaping Use: Never used   Substance and Sexual Activity    Alcohol use: Yes     Comment: sOCIAL    Drug use: Never    Sexual activity: Yes     Partners: Female   Other Topics Concern    Not on file   Social History Narrative    Not on file     Social Determinants of Health     Financial Resource Strain: Not on file   Food Insecurity: Not on file   Transportation Needs: Not on file   Physical Activity: Not on file   Stress: Not on file   Social Connections: Not on file   Intimate Partner Violence: Not on file   Housing Stability: Not on file       Current Medications  Current Outpatient Medications   Medication Sig Dispense Refill    amLODIPine (NORVASC) 5 mg tablet Take 1 tablet (5 mg total) by mouth daily 90 tablet 3    aspirin (ECOTRIN LOW STRENGTH) 81 mg EC tablet Take 81 mg by mouth daily      atorvastatin (LIPITOR) 20 mg tablet Take 1 tablet (20 mg total) by mouth daily 90 tablet 3    Dapagliflozin Propanediol (Farxiga) 5 MG TABS Take 1 tablet (5 mg total) by mouth in the morning 90 tablet 1    glucose blood test strip Test once daily or as directed 100 each 2    Lancets (onetouch ultrasoft) lancets Test once daily or as directed 100 each 3    lisinopril (ZESTRIL) 40 mg tablet Take 1 tablet (40 mg total) by mouth daily 90 tablet 3    metFORMIN (GLUCOPHAGE) 500 mg tablet Take 2 tablets (1,000 mg total) by mouth daily with breakfast for 7 days (Patient taking differently: Take 500 mg by mouth 2 (two) times a day with meals) 14 tablet 0    metoprolol succinate (TOPROL-XL) 25 mg 24 hr tablet TAKE 1 & 1/2 (ONE & ONE-HALF) TABLETS BY MOUTH AT BEDTIME 135 tablet 0    montelukast (SINGULAIR) 10 mg tablet Take 1 tablet (10 mg total) by mouth daily at bedtime 90 tablet 3    omega-3-acid ethyl esters (LOVAZA) 1 g capsule Take 1 capsule (1 g total) by mouth in the morning and 1 capsule (1 g total) in the evening  180 capsule 0    sildenafil (REVATIO) 20 mg tablet TAKE 2 TABLETS BY MOUTH 1 HOUR BEFORE INTERCOURSE 30 tablet 0    metFORMIN (GLUCOPHAGE-XR) 500 mg 24 hr tablet TAKE 4 TABLETS BY MOUTH ONCE DAILY (Patient not taking: Reported on 6/21/2022) 360 tablet 0    valACYclovir (VALTREX) 1,000 mg tablet Take 1 tablet (1,000 mg total) by mouth 3 (three) times a day for 7 days (Patient not taking: Reported on 5/17/2022) 21 tablet 0     No current facility-administered medications for this visit  Allergies  No Known Allergies      The following portions of the patient's history were reviewed and updated as appropriate: allergies, current medications, past medical history, past social history, past surgical history and problem list       Vitals  Vitals:    06/21/22 1511   BP: 124/76   Pulse: 69   SpO2: 98%   Weight: 96 6 kg (213 lb)   Height: 5' 9" (1 753 m)           Physical Exam  Physical Exam  Constitutional:       Appearance: Normal appearance     HENT: Head: Normocephalic and atraumatic  Right Ear: External ear normal       Left Ear: External ear normal    Eyes:      General: No scleral icterus  Conjunctiva/sclera: Conjunctivae normal    Cardiovascular:      Pulses: Normal pulses  Pulmonary:      Effort: Pulmonary effort is normal    Genitourinary:     Comments: No prostatic nodules or tenderness  Musculoskeletal:         General: Normal range of motion  Cervical back: Normal range of motion  Skin:     General: Skin is warm and dry  Neurological:      General: No focal deficit present  Mental Status: He is alert and oriented to person, place, and time  Psychiatric:         Mood and Affect: Mood normal          Behavior: Behavior normal          Thought Content:  Thought content normal          Judgment: Judgment normal            Results  No results found for this or any previous visit (from the past 1 hour(s)) ]  Lab Results   Component Value Date    PSA 5 0 (H) 06/17/2022    PSA 3 6 11/26/2021    PSA 3 0 10/13/2020     Lab Results   Component Value Date    CALCIUM 8 8 03/10/2019    K 4 6 11/26/2021    CO2 24 11/26/2021     11/26/2021    BUN 17 11/26/2021    CREATININE 1 20 11/26/2021     Lab Results   Component Value Date    WBC 7 9 11/26/2021    HGB 13 7 11/26/2021    HCT 40 2 11/26/2021    MCV 90 11/26/2021     11/26/2021           Orders  Orders Placed This Encounter   Procedures    PSA, total and free     Standing Status:   Future     Number of Occurrences:   1     Standing Expiration Date:   6/21/2023       Mona Ochoa PA-C

## 2022-07-01 DIAGNOSIS — J45.20 MILD INTERMITTENT ASTHMA WITHOUT COMPLICATION: ICD-10-CM

## 2022-07-01 DIAGNOSIS — I10 ESSENTIAL HYPERTENSION: ICD-10-CM

## 2022-07-01 RX ORDER — METOPROLOL SUCCINATE 25 MG/1
TABLET, EXTENDED RELEASE ORAL
Qty: 45 TABLET | Refills: 6 | Status: SHIPPED | OUTPATIENT
Start: 2022-07-01

## 2022-07-01 RX ORDER — MONTELUKAST SODIUM 10 MG/1
10 TABLET ORAL
Qty: 30 TABLET | Refills: 6 | Status: SHIPPED | OUTPATIENT
Start: 2022-07-01 | End: 2022-07-31

## 2022-07-01 RX ORDER — LISINOPRIL 40 MG/1
40 TABLET ORAL DAILY
Qty: 30 TABLET | Refills: 6 | Status: SHIPPED | OUTPATIENT
Start: 2022-07-01 | End: 2022-07-31

## 2022-07-05 DIAGNOSIS — E11.69 TYPE 2 DIABETES MELLITUS WITH OTHER SPECIFIED COMPLICATION, WITHOUT LONG-TERM CURRENT USE OF INSULIN (HCC): ICD-10-CM

## 2022-07-05 DIAGNOSIS — E11.59 TYPE 2 DIABETES MELLITUS WITH OTHER CIRCULATORY COMPLICATION, UNSPECIFIED WHETHER LONG TERM INSULIN USE (HCC): ICD-10-CM

## 2022-07-05 RX ORDER — METFORMIN HYDROCHLORIDE 500 MG/1
TABLET, EXTENDED RELEASE ORAL
Qty: 360 TABLET | Refills: 0 | Status: SHIPPED | OUTPATIENT
Start: 2022-07-05 | End: 2023-08-10

## 2022-07-06 DIAGNOSIS — E11.59 TYPE 2 DIABETES MELLITUS WITH OTHER CIRCULATORY COMPLICATION, UNSPECIFIED WHETHER LONG TERM INSULIN USE (HCC): ICD-10-CM

## 2022-08-05 ENCOUNTER — OFFICE VISIT (OUTPATIENT)
Dept: INTERNAL MEDICINE CLINIC | Facility: CLINIC | Age: 62
End: 2022-08-05
Payer: COMMERCIAL

## 2022-08-05 VITALS
SYSTOLIC BLOOD PRESSURE: 120 MMHG | OXYGEN SATURATION: 98 % | DIASTOLIC BLOOD PRESSURE: 68 MMHG | BODY MASS INDEX: 32.29 KG/M2 | WEIGHT: 218 LBS | RESPIRATION RATE: 18 BRPM | HEART RATE: 68 BPM | TEMPERATURE: 98.6 F | HEIGHT: 69 IN

## 2022-08-05 DIAGNOSIS — H60.312 ACUTE DIFFUSE OTITIS EXTERNA OF LEFT EAR: ICD-10-CM

## 2022-08-05 DIAGNOSIS — E11.69 TYPE 2 DIABETES MELLITUS WITH OTHER SPECIFIED COMPLICATION, WITHOUT LONG-TERM CURRENT USE OF INSULIN (HCC): Primary | ICD-10-CM

## 2022-08-05 LAB — SL AMB POCT HEMOGLOBIN AIC: 8.6 (ref ?–6.5)

## 2022-08-05 PROCEDURE — 3078F DIAST BP <80 MM HG: CPT | Performed by: INTERNAL MEDICINE

## 2022-08-05 PROCEDURE — 3052F HG A1C>EQUAL 8.0%<EQUAL 9.0%: CPT | Performed by: INTERNAL MEDICINE

## 2022-08-05 PROCEDURE — 3725F SCREEN DEPRESSION PERFORMED: CPT | Performed by: INTERNAL MEDICINE

## 2022-08-05 PROCEDURE — 3074F SYST BP LT 130 MM HG: CPT | Performed by: INTERNAL MEDICINE

## 2022-08-05 PROCEDURE — 99214 OFFICE O/P EST MOD 30 MIN: CPT | Performed by: INTERNAL MEDICINE

## 2022-08-05 PROCEDURE — 83036 HEMOGLOBIN GLYCOSYLATED A1C: CPT | Performed by: INTERNAL MEDICINE

## 2022-08-05 RX ORDER — LEVOFLOXACIN 500 MG/1
500 TABLET, FILM COATED ORAL EVERY 24 HOURS
Qty: 7 TABLET | Refills: 0 | Status: SHIPPED | OUTPATIENT
Start: 2022-08-05 | End: 2022-08-12

## 2022-08-05 NOTE — PATIENT INSTRUCTIONS
Hemoglobin A1c of 8 6%   Continue for metformin daily   Call Back in the Farxiga 5 mg daily   Check blood sugar at home 2 days a week twice a day   If blood sugars are not below 200 in about 1 week go to [place] 10 mg daily     Levaquin 500 mg daily x7 days for otitis externa

## 2022-08-05 NOTE — PROGRESS NOTES
Assessment/Plan:       Diagnoses and all orders for this visit:    Type 2 diabetes mellitus with other specified complication, without long-term current use of insulin ([facility] [address] )  -     POCT hemoglobin A1c    Acute diffuse otitis externa of left ear                Subjective:      Patient ID: Manuel Voss is a 57 y o  male  Hypertensive diabetic and dyslipidemic  I had seen him several months ago  Hemoglobin A1c was over 8 but he was having metformin side effects  So recommendation was to reduce fat norm to a total dose of 500 mg twice a day and begin Farxiga 5 mg daily with referral to diabetic Ed  However, the patient had some issues with elevated PSA and was concerned about some urinary frequency so he requested to given to the Jennifer Gannon an go to the metformin again  Currently taking 4 daily  GI affects of return but "I am just dealing with them "    While on a Jennifer [place] he did note urgency    Point of care hemoglobin A1c is up to 8  6! Complaint of several days sensation of fullness and discomfort left ear  The left ear canal is a bit swollen mother TMs normal   Given high sugar will treat for Pseudomonas otitis externa      Blood pressure is controlled  Lipids are controlled     Family history of prostate cancer  Father had prostate cancer  Now his identical twin brother has prostate cancer diagnosed after PSA of 4 03  His PSA was 3 6 but I sent him to Urology   Colonoscopy done 2019      The following portions of the patient's history were reviewed and updated as appropriate:   He has a past medical history of Asthma, Benign prostatic hyperplasia, Chronic kidney disease, Diabetes mellitus ([address] ), Family history of prostate cancer, Hypertension, Kidney stone, and Mixed hyperlipidemia ,  does not have any pertinent problems on file  ,   has a past surgical history that includes Shoulder surgery (2015); Cholecystectomy; and Rotator cuff repair  ,  family history includes Cancer in his father; Diabetes in his mother; Hypertension in his brother, father, and mother; Prostate cancer in his father  ,   reports that he has never smoked  He has never used smokeless tobacco  He reports current alcohol use  He reports that he does not use drugs  ,  has No Known Allergies     Current Outpatient Medications   Medication Sig Dispense Refill    amLODIPine (NORVASC) 5 mg tablet Take 1 tablet (5 mg total) by mouth daily 90 tablet 3    aspirin (ECOTRIN LOW STRENGTH) 81 mg EC tablet Take 81 mg by mouth daily      atorvastatin (LIPITOR) 20 mg tablet Take 1 tablet (20 mg total) by mouth daily 90 tablet 3    glucose blood test strip Test once daily or as directed 100 each 2    Lancets (onetouch ultrasoft) lancets Test once daily or as directed 100 each 3    metFORMIN (GLUCOPHAGE-XR) 500 mg 24 hr tablet TAKE 4 TABLETS BY MOUTH ONCE DAILY 360 tablet 0    metoprolol succinate (TOPROL-XL) 25 mg 24 hr tablet TAKE 1 & 1/2 (ONE & ONE-HALF) TABLETS BY MOUTH AT BEDTIME 45 tablet 6    omega-3-acid ethyl esters (LOVAZA) 1 g capsule Take 1 capsule (1 g total) by mouth in the morning and 1 capsule (1 g total) in the evening  180 capsule 0    sildenafil (REVATIO) 20 mg tablet TAKE 2 TABLETS BY MOUTH 1 HOUR BEFORE INTERCOURSE 30 tablet 0    lisinopril (ZESTRIL) 40 mg tablet Take 1 tablet (40 mg total) by mouth daily 30 tablet 6    montelukast (SINGULAIR) 10 mg tablet Take 1 tablet (10 mg total) by mouth daily at bedtime 30 tablet 6    valACYclovir (VALTREX) 1,000 mg tablet Take 1 tablet (1,000 mg total) by mouth 3 (three) times a day for 7 days 21 tablet 0     No current facility-administered medications for this visit  Review of Systems   HENT: Positive for ear pain  All other systems reviewed and are negative  Objective:  Vitals:    08/05/22 1550   BP: 120/68   Pulse: 68   Resp: 18   Temp: 98 6 °F (37 °C)   SpO2: 98%      Physical Exam  Constitutional:       Appearance: Normal appearance     HENT:      Right Ear: Tympanic membrane and ear canal normal       Left Ear: Tympanic membrane normal       Ears:      Comments: Very modest swelling of the skin of the left ear canal  Pulmonary:      Effort: Pulmonary effort is normal    Neurological:      General: No focal deficit present  Mental Status: He is alert  Psychiatric:         Mood and Affect: Mood normal            Patient Instructions   Hemoglobin A1c of 8 6%   Continue for metformin daily   Call Back in the Farxiga 5 mg daily   Check blood sugar at home 2 days a week twice a day   If blood sugars are not below 200 in about 1 week go to [facility] 10 mg daily     Levaquin 500 mg daily x7 days for otitis externa

## 2022-08-09 ENCOUNTER — OFFICE VISIT (OUTPATIENT)
Dept: DIABETES SERVICES | Facility: CLINIC | Age: 62
End: 2022-08-09
Payer: COMMERCIAL

## 2022-08-09 DIAGNOSIS — E11.69 TYPE 2 DIABETES MELLITUS WITH OTHER SPECIFIED COMPLICATION, WITHOUT LONG-TERM CURRENT USE OF INSULIN (HCC): ICD-10-CM

## 2022-08-09 PROCEDURE — G0108 DIAB MANAGE TRN  PER INDIV: HCPCS | Performed by: DIETITIAN, REGISTERED

## 2022-08-09 NOTE — PROGRESS NOTES
Type 2 Diabetes Class Assessment    HPI: Met with Gina Cordero for DSME Initial visit  Jonnie Gabriel has Type 2 Diabetes  Patient is interested in class but can only attend two classes per year  Jonnie Gabriel stated he will attend two sessions in the fall and two in the beginning of 2023  Patient would benefit from MNT when insurance will allow coverage  Diabetes Assessment  Visit Type: Initial visit  Present at Session: patient   Special Learning Needs: No  Barriers to Learning: no barriers    How do you feel about making lifestyle changes at this time? Good, feels ready to commit  How would you rate your current knowledge of diabetes? fair  How confident are you that will be able to take better control of your diabetes?: very good    How long have you had diabetes? 14 yrs  Have you had diabetes education in the past?: No  Do you have any family members with diabetes?: No  Do you monitor your blood sugar? no  Type of blood sugar monitor: OneTouch  How old is your meter?: 6 or 9 yrs old  How often do you test your blood sugars?:maybe once per month  Do you keep a written record of your blood sugars? Yes   Blood sugar log with patient today and reviewed by educator?: No   Blood Sugar ranges:      Any financial concerns pertaining to your diabetes supplies, medication or care?: No  Have you ever experienced hypoglycemia?:  Not sure, but does not believe he has lows  Have you ever been hospitalized or gone to the ER due to your blood sugars?: Maybe, but reported it could have been because of blood pressure  How do you treat low blood sugars?: with a candy bar (provided education on appropriate simple sugars)  How do you treat high blood sugars?  Drinks extra water  Do you wear a Diabetes I D ?: no  Where do you dispose of your sharps (needles,lancetes)?: directly into trash     Ht Readings from Last 1 Encounters:   08/05/22 5' 9" (1 753 m)     Wt Readings from Last 3 Encounters:   08/05/22 98 9 kg (218 lb)   06/21/22 96 6 kg (213 lb)   05/17/22 97 5 kg (215 lb)        There is no height or weight on file to calculate BMI       Lab Results   Component Value Date    HGBA1C 8 6 (A) 08/05/2022    HGBA1C 8 1 (A) 04/18/2022    HGBA1C 8 3 (H) 11/26/2021       No results found for: CHOL  Lab Results   Component Value Date    HDL 32 (L) 11/26/2021    HDL 39 (L) 10/13/2020    HDL 32 (L) 01/31/2020     Lab Results   Component Value Date    LDLCALC 69 11/26/2021    LDLCALC 65 10/13/2020    LDLCALC 43 01/31/2020     Lab Results   Component Value Date    TRIG 222 (H) 11/26/2021    TRIG 148 10/13/2020    TRIG 134 01/31/2020     No results found for: CHOLHDL  No results found for: Tl Parson    Weight Change: Yes intentional loss of 5-10 lbs about a month    Diet Assessment    Do you follow any special diet presently?: Yes, trying keto diet  Who cooks at home?:  patient and spouse  Who does the grocery shopping?: patient   How frequently do you eat out?: 1x per week    Activity Assessment    Exercise: not on a regimen, but stays active    Lifestyle/Social Assessment    Racial/ethnic group:                                       Primary Language: English  Marital Status:   Education Level: Some College (No Degree)  Work status: Retired  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  Who lives in your household?: spouse and children  Who is you primary support person(s): spouse   Describe your quality of life currently?: excellent  Any concerns for your safety?: No  Any Synagogue or cultural practices that may affect your diabetes care: No  Do you have a decrease or loss of hearing?: No, but has an infection currently   Do you have a decrease or loss of vision?: Yes - a little blurry after meals/desserts  When was the last time you had an ophthalmology exam?: 4/13/22  When was the last time you had dental exam?: 5-6 yrs ago  Do you check your feet for cracks, sores, debris?: Yes  When was the last time you had podiatry or foot exam?: April 2022  Last flu shot?: years ago  Pneumonia shot?: No      The patient's history was reviewed and updated as appropriate: allergies, current medications  Intervention    Diabetes Overview :   Jessie Osborneas was instructed on basic concepts of diabetes, including identifying role of diabetes self management, basic pathophysiology and types of diabetes, A1c and blood sugar targets  Jessie Lopez has good understanding of material covered  Taking Medications: Instructed patient on action, side effects, efficacy, prescribed dosage and appropriate timing and frequency of administration of his diabetes medication  Jessie Osborneas has good understanding of material covered  Monitoring Blood Sugars  Instructions for Meter Teaching- Patient instructed in the following:  Site selection and skin preparation, Loading strips and lancet device, meter activation, obtaining blood sample, test strip and lancet disposal and recording log book entries  Patient has good understanding of material covered and was able to test their own blood sugar in office today  Comments: Spoke to Jessie Osborneas about receiving instruction on meter  Patient reported that he knows how to use his meter  Testing frequency: Encouraged pair testing  Test sugars before a meal and 2hr after the same meal, rotating between breakfast, lunch, and dinner  Test sugars twice a day (3 days a week, 7 days a week)  Goal Blood Sugars:   Premeal , even better <110  2hr after a meal <180, even better <140  A1C <7%, even better <6 5%  Hypoglycemia: Instructed patient on definition/risk of hypoglycemia, treatment, causes/symptoms, when to notify provider of lows, prevention of hypoglycemia and exercise precautions  Comments: Zeenat Castellano understanding of hypoglycemia concepts      Physical Activity: Discussed benefits of physical activity to optimize blood glucose control, encouraged activity at patient is physically able   Always consult a physician prior to starting an exercise program   Comments: Rosy Cooper understanding of hypoglycemia concepts        Diabetes Education Record  Yarely Ahumada received the following handouts: LWD Class Assessment handout folder  Patient response to instruction    Comprehensiongood  Motivationgood  Expected Compliancegood    Thank you for referring your patient to [facility], it was a pleasure working with them today  Please feel free to call with any questions or concerns      Ariadne Krishnamurthy  [address] [place] [id number] [phone]

## 2022-08-09 NOTE — PATIENT INSTRUCTIONS
Class Assessment AVS    Please call to schedule to attend Living Well with Diabetes Classes  Goal Blood Sugars:   Premeal , even better <110  2hr after a meal <180, even better <140  A1C <7%, even better <6 5%  Thank you for coming to the [facility] for education today  Please feel free to call with any questions or concerns      Jo Ann Krishnamurthy  [address] [place] [address] [place] [address] [phone]

## 2022-08-10 ENCOUNTER — TELEPHONE (OUTPATIENT)
Dept: INTERNAL MEDICINE CLINIC | Facility: CLINIC | Age: 62
End: 2022-08-10

## 2022-08-10 NOTE — TELEPHONE ENCOUNTER
As per 's note on 08/05/22, pt saw dr Shelby Carbajal, wanted him to start taking Farxiga    [facility] [address] not on med list    Call back upon completion

## 2022-08-11 DIAGNOSIS — E11.69 TYPE 2 DIABETES MELLITUS WITH OTHER SPECIFIED COMPLICATION, WITHOUT LONG-TERM CURRENT USE OF INSULIN (HCC): Primary | ICD-10-CM

## 2022-08-19 ENCOUNTER — TELEPHONE (OUTPATIENT)
Dept: UROLOGY | Facility: MEDICAL CENTER | Age: 62
End: 2022-08-19

## 2022-08-19 DIAGNOSIS — R97.20 ELEVATED PSA: Primary | ICD-10-CM

## 2022-08-19 RX ORDER — CIPROFLOXACIN 500 MG/1
500 TABLET, FILM COATED ORAL EVERY 12 HOURS SCHEDULED
Qty: 2 TABLET | Refills: 0 | Status: SHIPPED | OUTPATIENT
Start: 2022-08-19 | End: 2022-08-20

## 2022-08-19 NOTE — TELEPHONE ENCOUNTER
Pt called stated that prostate biopsy was discussed at last OV  Pt called to schedule it  Please advise

## 2022-08-19 NOTE — TELEPHONE ENCOUNTER
Called and left a detailed message per communication consent  Informed patient I was calling to assist in scheduling a prostate biopsy  Informed patient if he was able to travel to [place] are we may be able to get him in sooner at that office  Informed patient we would call on Monday to assist in scheduling that  Office number left in voicemail

## 2022-08-22 NOTE — TELEPHONE ENCOUNTER
Spoke with patient, advised results appointment and that the office is cancelling his 10/4/22 appointment  Patient verbalized understanding and was thankful for the call back

## 2022-08-22 NOTE — TELEPHONE ENCOUNTER
Scheduled patient for abx injection and biopsy appt with Dr Yovany Gao at [facility]  Educated on biopsy prep sent, how to take it  Discussed that he could drive himself if desired  Advised him that I still need to schedule his results appt and that I would inform him of it at a later time   Advised him to call with any questions between now and his appt

## 2022-08-31 NOTE — TELEPHONE ENCOUNTER
Please advise patient his prostate biopsy appt on 9/29/22 changed times to 1130am  He will need to arrive at 11am for abx injection

## 2022-09-07 DIAGNOSIS — I10 ESSENTIAL HYPERTENSION: ICD-10-CM

## 2022-09-07 RX ORDER — SILDENAFIL CITRATE 20 MG/1
TABLET ORAL
Qty: 30 TABLET | Refills: 0 | Status: CANCELLED | OUTPATIENT
Start: 2022-09-07

## 2022-09-07 RX ORDER — SILDENAFIL CITRATE 20 MG/1
TABLET ORAL
Qty: 30 TABLET | Refills: 0 | Status: SHIPPED | OUTPATIENT
Start: 2022-09-07

## 2022-09-07 NOTE — TELEPHONE ENCOUNTER
Medication Refill Request     Name sildenafil (REVATIO) 20 mg tablet   Dose/Frequency 2 tablets before intercourse  Quantity 30  Verified pharmacy   [x]  Verified ordering Provider   [x]  Does patient have enough for the next 3 days?  Yes [x] No []

## 2022-09-21 ENCOUNTER — TELEPHONE (OUTPATIENT)
Dept: GASTROENTEROLOGY | Facility: CLINIC | Age: 62
End: 2022-09-21

## 2022-09-28 ENCOUNTER — NURSE TRIAGE (OUTPATIENT)
Dept: OTHER | Facility: OTHER | Age: 62
End: 2022-09-28

## 2022-09-28 NOTE — TELEPHONE ENCOUNTER
Regarding: Prostate Biopsy prep instructions  ----- Message from [employer] sent at 9/28/2022  2:38 PM EDT -----  ""I have a scheduled prostate biopsy scheduled for tomorrow 09/29/22 and need prep instructions "

## 2022-09-28 NOTE — TELEPHONE ENCOUNTER
Reason for Disposition   [1] Caller requesting NON-URGENT health information AND [2] PCP's office is the best resource    Answer Assessment - Initial Assessment Questions  1  REASON FOR CALL or QUESTION: "What is your reason for calling today?" or "How can I best help you?" or "What question do you have that I can help answer?"      Patient wants to know what to do prior to prostate bx tomorrow      Protocols used: INFORMATION ONLY CALL-ADULT-

## 2022-09-28 NOTE — TELEPHONE ENCOUNTER
Patient wants to take colace, mirlax and Gatorade   Advised patient that he shelley need to take Fleets as prescribed  Patient states he did stop Asprin 3 days prior and did not stop taking his fish oil   Contacted provider  Was advised by Site RN at [facility] that he can keep appt for tomorrow for procedure  Reviewed administration of fleets and premed for oral antibiotic  Confirmed patient to arrive at 11:00am for injection before procedure  Patient verbalized understanding and agreement

## 2022-09-29 ENCOUNTER — PROCEDURE VISIT (OUTPATIENT)
Dept: UROLOGY | Facility: CLINIC | Age: 62
End: 2022-09-29
Payer: COMMERCIAL

## 2022-09-29 VITALS
DIASTOLIC BLOOD PRESSURE: 70 MMHG | OXYGEN SATURATION: 97 % | SYSTOLIC BLOOD PRESSURE: 138 MMHG | HEART RATE: 72 BPM | WEIGHT: 216.8 LBS | RESPIRATION RATE: 16 BRPM | BODY MASS INDEX: 32.11 KG/M2 | HEIGHT: 69 IN

## 2022-09-29 DIAGNOSIS — R97.20 ELEVATED PSA: Primary | ICD-10-CM

## 2022-09-29 PROCEDURE — 96372 THER/PROPH/DIAG INJ SC/IM: CPT

## 2022-09-29 PROCEDURE — 76942 ECHO GUIDE FOR BIOPSY: CPT | Performed by: UROLOGY

## 2022-09-29 PROCEDURE — 88344 IMHCHEM/IMCYTCHM EA MLT ANTB: CPT | Performed by: SPECIALIST

## 2022-09-29 PROCEDURE — G0416 PROSTATE BIOPSY, ANY MTHD: HCPCS | Performed by: SPECIALIST

## 2022-09-29 PROCEDURE — 55700 PR BIOPSY OF PROSTATE,NEEDLE/PUNCH: CPT | Performed by: UROLOGY

## 2022-09-29 RX ORDER — CEFTRIAXONE 1 G/1
1000 INJECTION, POWDER, FOR SOLUTION INTRAMUSCULAR; INTRAVENOUS ONCE
Status: COMPLETED | OUTPATIENT
Start: 2022-09-29 | End: 2022-09-29

## 2022-09-29 RX ADMIN — CEFTRIAXONE 1000 MG: 1 INJECTION, POWDER, FOR SOLUTION INTRAMUSCULAR; INTRAVENOUS at 11:10

## 2022-09-29 NOTE — PROGRESS NOTES
Office TRUS-guided Prostate Biopsy Procedure Note    Indication    Elevated PSA    Informed consent   The risks, benefits and alternatives to TRUS-guided prostate biopsy were conveyed to the patient prior to performing the procedure  A discussion of the risks of the procedure included, but was not limited to: pain, hematuria, hematochezia, hematospermia, infection, and the possibility of a non-diagnostic biopsy  The patient was given the opportunity to have his questions answered and there was no perceived barrier to education  Antibiotic prophylaxis   The patient received the following antibiotics at least 30 minutes prior to undergoing biopsy: Ciprofloxacin 500 mg PO x2   1 g intramuscular Rocephin    Rectal cleansing  The patient was instructed to perform an evacuating rectal enema 1-2 hours prior to biopsy  Local anesthesia  Topical 2% lidocaine jelly was applied liberally to the anus and rectum and allowed to dwell for at least 5 min prior to starting the procedure  After insertion of the TRUS probe, 10 mL of 2% lidocaine solution was injected with ultrasound guidance at the  junction of the prostate and seminal vesicles  The anesthetic was allowed to dwell for at least 2 minutes prior to biopsy  Transrectal ultrasonography  The patient was placed in the left lateral decubitus position  After an attentive digital rectal examination, a 7 5 mHz sidefire ultrasound probe was gently inserted into the rectum and biplanar imaging of the prostate was done with the findings noted below  Images were taken of any abnormal findings and also to document prostate size      Bladder  The bladder base appeared normal     Prostate  Digital rectal exam findings:  - no palpable concerns    Ultrasound size measurements:  -Volume:  94 cm3    Ultrasound findings:  -Cysts: None  -Masses: None  -Median lobe: absent    Clinical stage (assuming a positive biopsy):   -T1c     TRUS-guided needle biopsy  Using an 18 gauge biopsy needle and ultrasound guidance, the following biopsies were taken:    1 core(s) from the left lateral base  1 core(s) from the left lateral mid-gland  1 core(s) from the right middle base  1 core(s) from the right lateral base  1 core(s) from the left lateral mid-gland  1 core(s) from the left middle mid-gland  1 core(s) from the right middle mid-gland  1 core(s) from the right lateral mid-gland  1 core(s) from the left lateral apex  1 core(s) from the left middle apex  1 core(s) from the right middle apex  1 core(s) from the right lateral apex    Total number of cores: 12                Complications  There were no procedural complications  Disposition  The patient was dismissed to home after 30 minutes of observation in stable condition  Post-procedure instructions: Today he underwent an uncomplicated transrectal ultrasound-guided biopsy of the prostate, following a periprosthetic nerve block  I reviewed the normal postprocedure a course including bleeding per recutm, hematuria, and hematospermia  I instructed him to complete his course of antibiotics as prescribed  Instructed him to call with fever greater than 101, chills, nausea, vomiting, poorly controlled pain  His followup was scheduled in approximately 2 weeks' time to review the pathology

## 2022-10-03 ENCOUNTER — TELEPHONE (OUTPATIENT)
Dept: OTHER | Facility: OTHER | Age: 62
End: 2022-10-03

## 2022-10-03 NOTE — TELEPHONE ENCOUNTER
Spoke with patient to find out his questions  He was disappointed as he was expecting to hear from Dr Jay Morrison  Informed patient this message can be relayed to provider pool with any questions or concerns he may have  Patient states over the weekend he only had a "trace" of blood in his urine  Today, he notices the blood in the beginning of the stream and then again at the end of stream   He states he was advised to call the office if the bleeding worsened, which is his reason for calling  He is asking if he needs an antibiotic or is this bleeding normal   Did advise him that bleeding can be expected in urine after prostate biopsy, but patient did not appreciate hearing from RN  He wanted provider's advice

## 2022-10-03 NOTE — TELEPHONE ENCOUNTER
Patient called today regarding Post Biopsy of Prostste Bleeding  Please call Patient back to advise

## 2022-10-04 DIAGNOSIS — R31.0 GROSS HEMATURIA: Primary | ICD-10-CM

## 2022-10-04 NOTE — TELEPHONE ENCOUNTER
Patient with intermittent hematuria post biopsy - no fevers however reports some occasional chill  No dysuria  We reviewed post biopsy hematuria can be common  Low suspicion for infection, however patient is interested in urine testing  He reports this has to be through [facility]  Labs entered, please email them to him  He will monitor his temperature  If he has concerns for fever post biopsy this can indicate hospital admission for IV antibiotics

## 2022-10-05 ENCOUNTER — TELEMEDICINE (OUTPATIENT)
Dept: DIABETES SERVICES | Facility: HOSPITAL | Age: 62
End: 2022-10-05
Payer: COMMERCIAL

## 2022-10-05 DIAGNOSIS — E11.69 TYPE 2 DIABETES MELLITUS WITH OTHER SPECIFIED COMPLICATION, WITHOUT LONG-TERM CURRENT USE OF INSULIN (HCC): Primary | ICD-10-CM

## 2022-10-05 LAB
APPEARANCE UR: CLEAR
BACTERIA UR CULT: NORMAL
BACTERIA URNS QL MICRO: NORMAL
BILIRUB UR QL STRIP: NEGATIVE
CASTS URNS QL MICRO: NORMAL /LPF
COLOR UR: YELLOW
EPI CELLS #/AREA URNS HPF: NORMAL /HPF (ref 0–10)
GLUCOSE UR QL: ABNORMAL
HGB UR QL STRIP: ABNORMAL
KETONES UR QL STRIP: NEGATIVE
LEUKOCYTE ESTERASE UR QL STRIP: NEGATIVE
Lab: NO GROWTH
MICRO URNS: ABNORMAL
NITRITE UR QL STRIP: NEGATIVE
PH UR STRIP: 5.5 [PH] (ref 5–7.5)
PROT UR QL STRIP: NEGATIVE
RBC #/AREA URNS HPF: NORMAL /HPF (ref 0–2)
SP GR UR: >=1.03 (ref 1–1.03)
UROBILINOGEN UR STRIP-ACNC: 0.2 MG/DL (ref 0.2–1)
WBC #/AREA URNS HPF: NORMAL /HPF (ref 0–5)

## 2022-10-05 PROCEDURE — 98961 EDU&TRN PT SLF-MGMT NQHP 2-4: CPT | Performed by: DIETITIAN, REGISTERED

## 2022-10-05 PROCEDURE — G0109 DIAB MANAGE TRN IND/GROUP: HCPCS | Performed by: DIETITIAN, REGISTERED

## 2022-10-05 NOTE — Clinical Note
DM class 1 completed- for your records, no action needed   Thanks! -Frank Stovall RD CDE Ivan Feliciano Jr  1991    Medication: ADDERALL    Provider: Eliecer Golden MD  Pharmacy:  [facility]     Preferred Contact Number:  [phone]    Who will be picking up: Patient    Advised patient that the nurse will call if there is a problem with the refill. Patient advised to allow 48/72 hours for the refill completion.

## 2022-10-05 NOTE — PROGRESS NOTES
Virtual Regular Visit    Verification of patient location:    Patient is located in the following state in which I hold an active license [place]      Assessment/Plan:    Problem List Items Addressed This Visit    None              Reason for visit is   Chief Complaint   Patient presents with    Virtual Regular Visit        Encounter provider Hamilton Murphy RD    Provider located at [place] [address] [address] [place] [address]      Recent Visits  No visits were found meeting these conditions  Showing recent visits within past 7 days and meeting all other requirements  Future Appointments  No visits were found meeting these conditions  Showing future appointments within next 150 days and meeting all other requirements       The patient was identified by name and date of birth  Angel Reynolds was informed that this is a telemedicine visit and that the visit is being conducted through [employer] and patient was informed that this is a secure, HIPAA-compliant platform  He agrees to proceed     My office door was closed  No one else was in the room  He acknowledged consent and understanding of privacy and security of the video platform  The patient has agreed to participate and understands they can discontinue the visit at any time  Patient is aware this is a billable service  Living Well with Diabetes Group Class #1    Angel Rosanakain attended the Living Well with Diabetes Group Class #1  Topics Covered in class today include: What is diabetes; Types of Diabetes; How Diabetes is diagnosed; Management skills; the role of exercise in blood sugar managements, Home glucose monitoring, and target ranges  Saranya Awan participated in group activities    The patient's history was reviewed and updated as appropriate: allergies, current medications      Lab Results   Component Value Date    HGBA1C 8 6 (A) 08/05/2022       Diabetes Education Record  Laurann Cabot was provided Living Well with Diabetes Class #1 book- sent via email      Patient response to instruction    Comprehensiongood  Motivationgood  Expected Compliancegood    Begin Time: 1pm  End Time: 1pm  Referring Provider: Dilip campbell for referring your patient to [facility], it was a pleasure working with them today  Please feel free to call with any questions or concerns  Edgard Potter  [address] [facility]  [address]  [facility] [id number]      Subjective  Carine Kay is a 57 y o  male see notes above   HPI     Past Medical History:   Diagnosis Date    Asthma     Benign prostatic hyperplasia     Chronic kidney disease     Diabetes mellitus ([address] )     Family history of prostate cancer     Hypertension     Kidney stone     Mixed hyperlipidemia        Past Surgical History:   Procedure Laterality Date    CHOLECYSTECTOMY      ROTATOR CUFF REPAIR      SHOULDER SURGERY  2015       Current Outpatient Medications   Medication Sig Dispense Refill    amLODIPine (NORVASC) 5 mg tablet Take 1 tablet (5 mg total) by mouth daily 90 tablet 3    aspirin (ECOTRIN LOW STRENGTH) 81 mg EC tablet Take 81 mg by mouth daily      atorvastatin (LIPITOR) 20 mg tablet Take 1 tablet (20 mg total) by mouth daily 90 tablet 3    bisacodyl (FLEET) 10 MG/30ML ENEM Insert 30 mL (10 mg total) into the rectum once for 1 dose    Use morning of prostate biopsy 30 mL 0    Dapagliflozin Propanediol (Farxiga) 5 MG TABS Take 1 tablet (5 mg total) by mouth daily 100 tablet 3    glucose blood test strip Test once daily or as directed 100 each 2    Lancets (onetouch ultrasoft) lancets Test once daily or as directed 100 each 3    lisinopril (ZESTRIL) 40 mg tablet Take 1 tablet (40 mg total) by mouth daily 30 tablet 6    metFORMIN (GLUCOPHAGE-XR) 500 mg 24 hr tablet TAKE 4 TABLETS BY MOUTH ONCE DAILY 360 tablet 0    metoprolol succinate (TOPROL-XL) 25 mg 24 hr tablet TAKE 1 & 1/2 (ONE & ONE-HALF) TABLETS BY MOUTH AT BEDTIME 45 tablet 6    montelukast (SINGULAIR) 10 mg tablet Take 1 tablet (10 mg total) by mouth daily at bedtime 30 tablet 6    omega-3-acid ethyl esters (LOVAZA) 1 g capsule Take 1 capsule (1 g total) by mouth in the morning and 1 capsule (1 g total) in the evening  180 capsule 0    sildenafil (REVATIO) 20 mg tablet TAKE 2 TABLETS BY MOUTH ONCE DAILY 1  HOUR  BEFORE  INTERCOURSE  30 tablet 0    valACYclovir (VALTREX) 1,000 mg tablet Take 1 tablet (1,000 mg total) by mouth 3 (three) times a day for 7 days 21 tablet 0     No current facility-administered medications for this visit  No Known Allergies    Review of Systems    Video Exam    There were no vitals filed for this visit      Physical Exam     I spent 120 minutes with patient today in which greater than 50% of the time was spent in counseling/coordination of care regarding diabetes

## 2022-10-06 NOTE — TELEPHONE ENCOUNTER
Patient called requesting his biopsy results which he saw on my chart  I reviewed the results and explained grade group 1 low risk [place] 6 prostate cancer  This will be reviewed in depth by Dr Estiven Gomez at his upcoming appointment  We did discuss active surveillance PSA monitoring, multiparametric MRI and the potential for future prostate biopsies as per the guidelines  All questions were answered to the best my ability  He will follow-up as scheduled for his pathology discussion

## 2022-10-06 NOTE — TELEPHONE ENCOUNTER
Advised patient of negative urine testing  Patient then said he saw results on mychart of the prostate bx  He asked to speak with someone sooner then the appointment on 10/20 with Dr Joe Deng so he con get referrals to med onc or at least figure out what the plan is  Will route to providers and see what they would like to do

## 2022-10-06 NOTE — TELEPHONE ENCOUNTER
Beny [id number]   10/6/2022  6:48 AM EDT Back to Top        Please let patient know his urine testing is normal

## 2022-10-11 PROBLEM — H60.312 ACUTE DIFFUSE OTITIS EXTERNA OF LEFT EAR: Status: RESOLVED | Noted: 2022-08-05 | Resolved: 2022-10-11

## 2022-10-12 ENCOUNTER — TELEMEDICINE (OUTPATIENT)
Dept: DIABETES SERVICES | Facility: CLINIC | Age: 62
End: 2022-10-12
Payer: COMMERCIAL

## 2022-10-12 DIAGNOSIS — E11.69 TYPE 2 DIABETES MELLITUS WITH OTHER SPECIFIED COMPLICATION, WITHOUT LONG-TERM CURRENT USE OF INSULIN (HCC): Primary | ICD-10-CM

## 2022-10-12 PROBLEM — S61.215A LACERATION OF LEFT RING FINGER: Status: RESOLVED | Noted: 2021-10-05 | Resolved: 2022-10-12

## 2022-10-12 PROCEDURE — 98961 EDU&TRN PT SLF-MGMT NQHP 2-4: CPT

## 2022-10-12 PROCEDURE — 97804 MEDICAL NUTRITION GROUP: CPT

## 2022-10-12 PROCEDURE — G0109 DIAB MANAGE TRN IND/GROUP: HCPCS

## 2022-10-12 NOTE — PROGRESS NOTES
Virtual Regular Visit    Verification of patient location:    Patient is located in the following state in which I hold an active license [place]      Assessment/Plan:    Problem List Items Addressed This Visit        Endocrine    Diabetes mellitus ([facility] [address] ) - Primary               Reason for visit is   Chief Complaint   Patient presents with   • Diabetes Type 2   • Virtual Regular Visit        Encounter provider [facility]    Provider located at [address] [address] [id number]      Recent Visits  No visits were found meeting these conditions  Showing recent visits within past 7 days and meeting all other requirements  Today's Visits  Date Type Provider Dept   10/12/22 Telemedicine [facility] Pg Diabetic Education [address] [facility] today's visits and meeting all other requirements  Future Appointments  No visits were found meeting these conditions  Showing future appointments within next 150 days and meeting all other requirements       The patient was identified by name and date of birth  Fauzia Elliott was informed that this is a telemedicine visit and that the visit is being conducted through [employer] and patient was informed that this is a secure, HIPAA-compliant platform  He agrees to proceed     My office door was closed  No one else was in the room  He acknowledged consent and understanding of privacy and security of the video platform  The patient has agreed to participate and understands they can discontinue the visit at any time  Patient is aware this is a billable service  Subjective  Fauzia Elliott is a 57 y o  male T2DM  See DSMT notes below        HPI     Past Medical History:   Diagnosis Date   • Asthma    • Benign prostatic hyperplasia    • Chronic kidney disease    • Diabetes mellitus ([facility] [address] )    • Family history of prostate cancer    • Hypertension    • Kidney stone    • Mixed hyperlipidemia        Past Surgical History:   Procedure Laterality Date   • CHOLECYSTECTOMY     • ROTATOR CUFF REPAIR     • SHOULDER SURGERY  2015       Current Outpatient Medications   Medication Sig Dispense Refill   • amLODIPine (NORVASC) 5 mg tablet Take 1 tablet (5 mg total) by mouth daily 90 tablet 3   • aspirin (ECOTRIN LOW STRENGTH) 81 mg EC tablet Take 81 mg by mouth daily     • atorvastatin (LIPITOR) 20 mg tablet Take 1 tablet (20 mg total) by mouth daily 90 tablet 3   • bisacodyl (FLEET) 10 MG/30ML ENEM Insert 30 mL (10 mg total) into the rectum once for 1 dose   Use morning of prostate biopsy 30 mL 0   • Dapagliflozin Propanediol (Farxiga) 5 MG TABS Take 1 tablet (5 mg total) by mouth daily 100 tablet 3   • glucose blood test strip Test once daily or as directed 100 each 2   • Lancets (onetouch ultrasoft) lancets Test once daily or as directed 100 each 3   • lisinopril (ZESTRIL) 40 mg tablet Take 1 tablet (40 mg total) by mouth daily 30 tablet 6   • metFORMIN (GLUCOPHAGE-XR) 500 mg 24 hr tablet TAKE 4 TABLETS BY MOUTH ONCE DAILY 360 tablet 0   • metoprolol succinate (TOPROL-XL) 25 mg 24 hr tablet TAKE 1 & 1/2 (ONE & ONE-HALF) TABLETS BY MOUTH AT BEDTIME 45 tablet 6   • montelukast (SINGULAIR) 10 mg tablet Take 1 tablet (10 mg total) by mouth daily at bedtime 30 tablet 6   • omega-3-acid ethyl esters (LOVAZA) 1 g capsule Take 1 capsule (1 g total) by mouth in the morning and 1 capsule (1 g total) in the evening  180 capsule 0   • sildenafil (REVATIO) 20 mg tablet TAKE 2 TABLETS BY MOUTH ONCE DAILY 1  HOUR  BEFORE  INTERCOURSE  30 tablet 0   • valACYclovir (VALTREX) 1,000 mg tablet Take 1 tablet (1,000 mg total) by mouth 3 (three) times a day for 7 days 21 tablet 0     No current facility-administered medications for this visit  No Known Allergies    Review of Systems    Video Exam    There were no vitals filed for this visit      Physical Exam     I spent 120 minutes directly with the patient during this visit     Living Well with Diabetes Group Class #2    Kizzy Goodrich attended the Living Well with Diabetes Group Class #2  During class, Jessie Lopez was instructed on the following topics: Macronutrients, Carbohydrate sources, What one serving of carbohydrate equals, effects of diet on blood glucose levels, effect of carbohydrates on blood glucose levels, basics of meal planning: balance, portions, meal times, measurements, reading food labels to determine carbohydrates  Jessie Lopez participated in group activities of reading labels together and completing the meal planning activity  Jessie Lopez will follow up with class #3  Will call with any questions or concerns prior to next session  The patient's history was reviewed and updated as appropriate: allergies, current medications  Lab Results   Component Value Date    HGBA1C 8 6 (A) 08/05/2022       Diabetes Education Record  Jsesie Lopez was provided Living Well with Diabetes Class #2 book      Patient response to instruction    Comprehensiongood  Motivationgood  Expected Compliancegood    Start- Stop: 1:00-3:00  Total Minutes: 120 Minutes  Group or Individual Instruction: DSMT-G2  Other: Jessi Gusman        Thank you for referring your patient to [address], it was a pleasure working with them today  Please feel free to call with any questions or concerns      Rohit Keen  [address] [place] [address] Drive [id number]

## 2022-10-20 ENCOUNTER — TELEPHONE (OUTPATIENT)
Dept: UROLOGY | Facility: CLINIC | Age: 62
End: 2022-10-20

## 2022-10-20 ENCOUNTER — OFFICE VISIT (OUTPATIENT)
Dept: UROLOGY | Facility: CLINIC | Age: 62
End: 2022-10-20
Payer: COMMERCIAL

## 2022-10-20 VITALS
SYSTOLIC BLOOD PRESSURE: 128 MMHG | BODY MASS INDEX: 32.17 KG/M2 | OXYGEN SATURATION: 99 % | RESPIRATION RATE: 16 BRPM | WEIGHT: 217.2 LBS | DIASTOLIC BLOOD PRESSURE: 70 MMHG | HEIGHT: 69 IN | HEART RATE: 70 BPM

## 2022-10-20 DIAGNOSIS — N40.1 BENIGN PROSTATIC HYPERPLASIA (BPH) WITH STRAINING ON URINATION: ICD-10-CM

## 2022-10-20 DIAGNOSIS — C61 PROSTATE CANCER (HCC): Primary | ICD-10-CM

## 2022-10-20 DIAGNOSIS — R39.16 BENIGN PROSTATIC HYPERPLASIA (BPH) WITH STRAINING ON URINATION: ICD-10-CM

## 2022-10-20 PROCEDURE — 99215 OFFICE O/P EST HI 40 MIN: CPT | Performed by: UROLOGY

## 2022-10-20 RX ORDER — FINASTERIDE 5 MG/1
5 TABLET, FILM COATED ORAL DAILY
Qty: 90 TABLET | Refills: 3 | Status: SHIPPED | OUTPATIENT
Start: 2022-10-20

## 2022-10-20 NOTE — PROGRESS NOTES
Referring Physician: Rosalba Hernandez MD  A copy of this note was sent to the referring physician  Diagnoses and all orders for this visit:    Prostate cancer [facility])  -     Ambulatory Referral to Radiation Oncology; Future  -     PSA Total, Diagnostic; Future  -     PSA Total, Diagnostic    Benign prostatic hyperplasia (BPH) with straining on urination  -     finasteride (PROSCAR) 5 mg tablet; Take 1 tablet (5 mg total) by mouth daily            Assessment and plan:       1  Newly diagnosed low risk prostate cancer  -cT1c  -grade group 1 (Yeni 3+3=6 disease, 4 of 12 total positive cores-left medial base, left medial base, right lateral apex, right medial apex)  Maximum core positivity: 10%  - pre treatment PSA:  5 0    I Recommended active surveillance as the standard of care for Delta 6 disease  We had a productive discussion today regarding the patient's biopsy results which are positive for NCCN criteria very low risk prostate cancer  We reviewed management options which include:     -active surveillance which is the recommended modality for low risk disease  -definitive management in the form of a robotic prostatectomy  -or external beam radiation treatment  Pros and cons of each modality were discussed  We discussed active surveillance extensively as this is the preferred modality for low risk Yeni 6 disease  We discussed that this is standard of care, as the patient's prostate cancer currently presents no clinical risk to his health or wellness or longevity and that it can be safely monitored with appropriate precautions  We did discuss the standard active surveillance protocol  We discussed the role of serial PSA testing, further imaging with a multiparametric MRI    We discussed that the MRI will both help completely stage the extent of his cancer, provide a noninvasive tool to follow for potential progression over time, and allow for targeted biopsies to be obtained in the future  Finally we discussed that a repeat biopsy 1 year from the time of his diagnosis is a crucial part of the protocol  If these cysts forthcoming MRI demonstrates a detectable lesion we will plan for a fusion biopsy going forward  Finally we discussed the option of definitive upfront management which would have an excellent long-term success rate and cancer control but may subject the patient to over treatment  Patient is interested in definitite upfront treatment  He is very averse to surgery  Twin brother was treated with radiation he would like to meet with a radiation oncologist to discuss definitive local treatment  A referral has been placed to Radiation Oncology  He will follow-up with our advanced practitioner team in the [facility] office in 6 months with a PSA  I have spent 46 minutes with Patient and family today in which greater than 50% of this time was spent in counseling/coordination of care regarding Diagnostic results, Prognosis, Risks and benefits of tx options, Risk factor reductions and Impressions  Star Spencer      Chief Complaint      Biopsy follow-up      History of Present Illness     Bobo Lawton is a 57 y o  male returns in follow-up for elevated PSA  This is a 79-year-old male was found have an elevated PSA 5 0 from a baseline of around 3 6  He was counseled on options and elected for an upfront transrectal biopsy which I performed 2 weeks ago  He tolerated the procedure well  He did have some expected postprocedural hematuria which has resolved  He has had no pain  He returns accompanied by his wife to review the results which are positive for grade group 1 disease in 4 of 12 total positive cores  Detailed Urologic History     - please refer to HPI    Review of Systems     Review of Systems   Constitutional: Negative for activity change and fatigue  HENT: Negative for congestion      Eyes: Negative for visual disturbance  Respiratory: Negative for shortness of breath and wheezing  Cardiovascular: Negative for chest pain and leg swelling  Gastrointestinal: Negative for abdominal pain  Endocrine: Negative for polyuria  Genitourinary: Negative for dysuria, flank pain, hematuria and urgency  Musculoskeletal: Negative for back pain  Allergic/Immunologic: Negative for immunocompromised state  Neurological: Negative for dizziness and numbness  Psychiatric/Behavioral: Negative for dysphoric mood  All other systems reviewed and are negative  Allergies     No Known Allergies    Physical Exam       Physical Exam  Constitutional:       General: He is not in acute distress  Appearance: He is well-developed  HENT:      Head: Normocephalic and atraumatic  Cardiovascular:      Comments: Negative lower extremity edema  Pulmonary:      Effort: Pulmonary effort is normal       Breath sounds: Normal breath sounds  Abdominal:      Palpations: Abdomen is soft  Musculoskeletal:         General: Normal range of motion  Cervical back: Normal range of motion  Skin:     General: Skin is warm  Neurological:      Mental Status: He is alert and oriented to person, place, and time     Psychiatric:         Behavior: Behavior normal            Vital Signs  Vitals:    10/20/22 1514   BP: 128/70   BP Location: Left arm   Patient Position: Sitting   Cuff Size: Large   Pulse: 70   Resp: 16   SpO2: 99%   Weight: 98 5 kg (217 lb 3 2 oz)   Height: 5' 9" (1 753 m)         Current Medications       Current Outpatient Medications:   •  amLODIPine (NORVASC) 5 mg tablet, Take 1 tablet (5 mg total) by mouth daily, Disp: 90 tablet, Rfl: 3  •  aspirin (ECOTRIN LOW STRENGTH) 81 mg EC tablet, Take 81 mg by mouth daily, Disp: , Rfl:   •  atorvastatin (LIPITOR) 20 mg tablet, Take 1 tablet (20 mg total) by mouth daily, Disp: 90 tablet, Rfl: 3  •  Dapagliflozin Propanediol (Farxiga) 5 MG TABS, Take 1 tablet (5 mg total) by mouth daily, Disp: 100 tablet, Rfl: 3  •  finasteride (PROSCAR) 5 mg tablet, Take 1 tablet (5 mg total) by mouth daily, Disp: 90 tablet, Rfl: 3  •  glucose blood test strip, Test once daily or as directed, Disp: 100 each, Rfl: 2  •  Lancets (onetouch ultrasoft) lancets, Test once daily or as directed, Disp: 100 each, Rfl: 3  •  metFORMIN (GLUCOPHAGE-XR) 500 mg 24 hr tablet, TAKE 4 TABLETS BY MOUTH ONCE DAILY, Disp: 360 tablet, Rfl: 0  •  metoprolol succinate (TOPROL-XL) 25 mg 24 hr tablet, TAKE 1 & 1/2 (ONE & ONE-HALF) TABLETS BY MOUTH AT BEDTIME, Disp: 45 tablet, Rfl: 6  •  omega-3-acid ethyl esters (LOVAZA) 1 g capsule, Take 1 capsule (1 g total) by mouth in the morning and 1 capsule (1 g total) in the evening , Disp: 180 capsule, Rfl: 0  •  sildenafil (REVATIO) 20 mg tablet, TAKE 2 TABLETS BY MOUTH ONCE DAILY 1  HOUR  BEFORE  INTERCOURSE , Disp: 30 tablet, Rfl: 0  •  bisacodyl (FLEET) 10 MG/30ML ENEM, Insert 30 mL (10 mg total) into the rectum once for 1 dose    Use morning of prostate biopsy, Disp: 30 mL, Rfl: 0  •  lisinopril (ZESTRIL) 40 mg tablet, Take 1 tablet (40 mg total) by mouth daily, Disp: 30 tablet, Rfl: 6  •  montelukast (SINGULAIR) 10 mg tablet, Take 1 tablet (10 mg total) by mouth daily at bedtime, Disp: 30 tablet, Rfl: 6  •  valACYclovir (VALTREX) 1,000 mg tablet, Take 1 tablet (1,000 mg total) by mouth 3 (three) times a day for 7 days, Disp: 21 tablet, Rfl: 0      Active Problems     Patient Active Problem List   Diagnosis   • Diabetes mellitus ([facility] [address] )   • Asthma   • Essential hypertension   • Herpes zoster without complication   • Prostate cancer screening   • Pre-operative cardiovascular examination   • Other specified glaucoma   • Hypertensive heart disease without heart failure   • Right elbow pain   • Chest pressure   • Mixed hyperlipidemia   • Family history of early CAD   • Claudication [facility])         Past Medical History     Past Medical History:   Diagnosis Date   • Asthma    • Benign prostatic hyperplasia    • Chronic kidney disease    • Diabetes mellitus ([facility] [address] )    • Family history of prostate cancer    • Hypertension    • Kidney stone    • Mixed hyperlipidemia          Surgical History     Past Surgical History:   Procedure Laterality Date   • CHOLECYSTECTOMY     • ROTATOR CUFF REPAIR     • SHOULDER SURGERY  2015         Family History     Family History   Problem Relation Age of Onset   • Hypertension Mother    • Diabetes Mother    • Cancer Father    • Hypertension Father    • Prostate cancer Father    • Hypertension Brother          Social History     Social History     Social History     Tobacco Use   Smoking Status Never Smoker   Smokeless Tobacco Never Used         Pertinent Lab Values     Lab Results   Component Value Date    CREATININE 1 20 11/26/2021       Lab Results   Component Value Date    PSA 5 0 (H) 06/17/2022    PSA 3 6 11/26/2021    PSA 3 0 10/13/2020       @RESULTRCNT(1H])@      Pertinent Imaging      - n/a    Portions of the record may have been created with voice recognition software  Occasional wrong word or "sound a like" substitutions may have occurred due to the inherent limitations of voice recognition software  Read the chart carefully and recognize, using context, where substitutions have occurred

## 2022-10-24 PROBLEM — C61 PROSTATE CANCER (HCC): Status: ACTIVE | Noted: 2022-10-24

## 2022-10-31 ENCOUNTER — RADIATION ONCOLOGY CONSULT (OUTPATIENT)
Dept: RADIATION ONCOLOGY | Facility: CLINIC | Age: 62
End: 2022-10-31
Attending: RADIOLOGY

## 2022-10-31 ENCOUNTER — CLINICAL SUPPORT (OUTPATIENT)
Dept: RADIATION ONCOLOGY | Facility: CLINIC | Age: 62
End: 2022-10-31
Attending: RADIOLOGY

## 2022-10-31 VITALS
OXYGEN SATURATION: 95 % | DIASTOLIC BLOOD PRESSURE: 90 MMHG | TEMPERATURE: 97.6 F | HEART RATE: 76 BPM | SYSTOLIC BLOOD PRESSURE: 152 MMHG | WEIGHT: 218 LBS | BODY MASS INDEX: 32.19 KG/M2 | RESPIRATION RATE: 16 BRPM

## 2022-10-31 DIAGNOSIS — C61 PROSTATE CANCER (HCC): Primary | ICD-10-CM

## 2022-10-31 DIAGNOSIS — C61 PROSTATE CANCER (HCC): ICD-10-CM

## 2022-10-31 NOTE — PROGRESS NOTES
Consultation - Radiation Oncology      OOT:[id number] : 1960  Encounter: [id number]  Patient Information: [address]  Chief Complaint   Patient presents with   • Prostate Cancer   • Consult     Cancer Staging  Prostate cancer [facility])  Staging form: Prostate, AJCC 8th Edition  - Clinical: cT1c, cN0, cM0, Grade Group: 1 - Unsigned  Histologic grading system: 5 grade system         History of Present Illness   Sharleen Aase is a 57 y o male with family history significant for identical twin brother and father with prostate cancer and past medical history significant for DM and CKD who was recently diagnosed with low risk prostate cancer  He presents today to discuss definitive radiation treatment options  Briefly, the patient was noted with elevated PSA of 5 1 on screening from 22  Lab Results   Component Value Date    PSA 5 0 (H) 2022    PSA 3 6 2021    PSA 3 0 10/13/2020        9/29/22 TRUS prostate biopsy  Digital rectal exam findings:  - no palpable concerns   Ultrasound size measurements:  -Volume:        94 cm3  Pathology demonstrated prostatic adenocarcinoma, [place] score 6 (3+3) involving 4/12 cores  Specifically the left lateral base, left base, right lateral apex, and right apex cores demonstrated between less than 5 to 60% tissue involvement of the submitted cores  There was no perineural invasion        10/20/22 Dr Montano Beam  cT1c, grade group 1 (Bisbee 3+3=6 disease, 4 of 12 total positive cores-left medial base, left medial base, right lateral apex, right medial apex)   Maximum core positivity: 10%  - pre treatment PSA:  5 0  Recommended active surveillance as the standard of care for Bisbee 6 disease    Patient is interested in definitite upfront treatment     Does not want surgery     Twin brother was treated with radiation he would like to meet with a radiation oncologist to discuss definitive local treatment     Refer to Rad Onc  Follow up 6 months with PSA PTV    Currently, the patient generally feels well  He has baseline urinary frequency and urgency as well as a weak urinary stream   IPSS score 11  He notes that he was recently diagnosed with candidal infection of the urination associated with new medications for his diabetes  He is under treatment for this  He denies any prior dysuria, hematuria, or urinary incontinence  He has periodic diarrhea associated with metformin intake  He denies any history of rectal bleeding  States that he is had a colonoscopy approximately 10+ years ago which was normal   He is sexually active insert for his erectile dysfunction  He uses Viagra to good effect  [facility]) erectile function score 1-2/3 increase to 2-3/3 with phosphodiesterase inhibitor       [facility]    Historical Information   Oncology History   Prostate cancer [facility])   2022 Biopsy      Prostate, left lateral mid:  - Benign prostatic tissue   - Negative for malignancy  B  Prostate, left lateral base:  - Histologic Type: Acinar adenocarcinoma  - [place] score: 3 + 3 = 6  - Involvement: Involving 60% of 1 of 1 core  - Perineural invasion: Absent     -  Multiplex immunohistochemical stain performed with appropriate controls shows malignant glands with loss of basal layer cells staining for p63 and high molecular weight keratin with luminal racemase expression, supporting the diagnosis  C  Prostate, left lateral apex:  - Benign prostatic tissue   - Negative for malignancy  -  Multiplex immunohistochemical stain performed with appropriate controls shows benign glands with intact basal layer cells staining for p63 and high molecular weight keratin with negative luminal racemase expression, supporting the diagnosis  D  Prostate, left  apex:  - Benign prostatic tissue   - Negative for malignancy  E  Prostate, left mid:  - Benign prostatic tissue   - Negative for malignancy        F  Prostate, left base:  - Histologic Type: Acinar adenocarcinoma  - [place] score: 3 + 3 = 6  - Involvement: Involving 10% of 1 of 1 core  - Perineural invasion: Absent     G  Prostate, right lateral apex:  - Histologic Type: Acinar adenocarcinoma  - [place] score: 3 + 3 = 6  - Involvement: Involving <5% of 1 of 1 core  - Perineural invasion: Absent     H  Prostate, right lateral base:  - Benign prostatic tissue   - Negative for malignancy  I  Prostate, right lateral mid:  - Benign prostatic tissue   - Negative for malignancy  J  Prostate, right base:  - Benign prostatic tissue   - Negative for malignancy  K  Prostate, right mid:  - Benign prostatic tissue   - Negative for malignancy  L  Prostate, right apex:  - Histologic Type: Acinar adenocarcinoma  - [place] score: 3 + 3 = 6  - Involvement: Involving 10% of 1 of 1 core    - Perineural invasion: Absent      10/24/2022 Initial Diagnosis    Prostate cancer [facility])           Past Medical History:   Diagnosis Date   • Asthma    • Benign prostatic hyperplasia    • Chronic kidney disease    • Diabetes mellitus ([address] )    • Family history of prostate cancer    • Hypertension    • Kidney stone    • Mixed hyperlipidemia    • Prostate cancer [facility])      Past Surgical History:   Procedure Laterality Date   • CHOLECYSTECTOMY     • PROSTATE BIOPSY     • ROTATOR CUFF REPAIR     • SHOULDER SURGERY  2015       Family History   Problem Relation Age of Onset   • Hypertension Mother    • Diabetes Mother    • Cancer Father    • Hypertension Father    • Prostate cancer Father    • Hypertension Brother        Social History   Social History     Substance and Sexual Activity   Alcohol Use Yes    Comment: sOCIAL     Social History     Substance and Sexual Activity   Drug Use Yes   • Types: Marijuana    Comment: has vape pen     Social History     Tobacco Use   Smoking Status Never Smoker   Smokeless Tobacco Never Used         Meds/Allergies     Current Outpatient Medications:   •  amLODIPine (NORVASC) 5 mg tablet, Take 1 tablet (5 mg total) by mouth daily, Disp: 90 tablet, Rfl: 3  •  aspirin (ECOTRIN LOW STRENGTH) 81 mg EC tablet, Take 81 mg by mouth daily, Disp: , Rfl:   •  atorvastatin (LIPITOR) 20 mg tablet, Take 1 tablet (20 mg total) by mouth daily, Disp: 90 tablet, Rfl: 3  •  glucose blood test strip, Test once daily or as directed, Disp: 100 each, Rfl: 2  •  Lancets (onetouch ultrasoft) lancets, Test once daily or as directed, Disp: 100 each, Rfl: 3  •  lisinopril (ZESTRIL) 40 mg tablet, Take 1 tablet (40 mg total) by mouth daily, Disp: 30 tablet, Rfl: 6  •  metFORMIN (GLUCOPHAGE-XR) 500 mg 24 hr tablet, TAKE 4 TABLETS BY MOUTH ONCE DAILY (Patient taking differently: 2 (two) times a day with meals TAKE 2 TABLETS BY MOUTH BID), Disp: 360 tablet, Rfl: 0  •  metoprolol succinate (TOPROL-XL) 25 mg 24 hr tablet, TAKE 1 & 1/2 (ONE & ONE-HALF) TABLETS BY MOUTH AT BEDTIME, Disp: 45 tablet, Rfl: 6  •  montelukast (SINGULAIR) 10 mg tablet, Take 1 tablet (10 mg total) by mouth daily at bedtime, Disp: 30 tablet, Rfl: 6  •  omega-3-acid ethyl esters (LOVAZA) 1 g capsule, Take 1 capsule (1 g total) by mouth in the morning and 1 capsule (1 g total) in the evening , Disp: 180 capsule, Rfl: 0  •  sildenafil (REVATIO) 20 mg tablet, TAKE 2 TABLETS BY MOUTH ONCE DAILY 1  HOUR  BEFORE  INTERCOURSE , Disp: 30 tablet, Rfl: 0  •  bisacodyl (FLEET) 10 MG/30ML ENEM, Insert 30 mL (10 mg total) into the rectum once for 1 dose    Use morning of prostate biopsy, Disp: 30 mL, Rfl: 0  •  Dapagliflozin Propanediol (Farxiga) 5 MG TABS, Take 1 tablet (5 mg total) by mouth daily (Patient not taking: Reported on 10/31/2022), Disp: 100 tablet, Rfl: 3  •  finasteride (PROSCAR) 5 mg tablet, Take 1 tablet (5 mg total) by mouth daily (Patient not taking: Reported on 10/31/2022), Disp: 90 tablet, Rfl: 3  •  valACYclovir (VALTREX) 1,000 mg tablet, Take 1 tablet (1,000 mg total) by mouth 3 (three) times a day for 7 days, Disp: 21 tablet, Rfl: 0  No Known Allergies      Review of Systems   Constitutional: Positive for fatigue  HENT: Positive for dental problem (needs dental extraction) and tinnitus  Eyes:        Wears glasses  Sees floaters- follows with ophthamologist   Respiratory: Positive for wheezing  Asthma   Cardiovascular: Positive for leg swelling (left ankle; occasional)  Gastrointestinal: Positive for diarrhea  Genitourinary: Positive for frequency  Musculoskeletal: Positive for arthralgias (B/L shoulders; left wrist)  Skin: Negative  Allergic/Immunologic: Negative  Neurological: Negative  Hematological: Negative  Psychiatric/Behavioral: Negative           Clinical Trial: no     IPSS Questionnaire (AUA-7): Over the past month…     1)  How often have you had a sensation of not emptying your bladder completely after you finish urinating? 1 - Less than 1 time in 5   2)  How often have you had to urinate again less than two hours after you finished urinating? 1 - Less than 1 time in 5   3)  How often have you found you stopped and started again several times when you urinated? 1 - Less than 1 time in 5   4) How difficult have you found it to postpone urination? 2 - Less than half the time   5) How often have you had a weak urinary stream?  5 - Almost always   6) How often have you had to push or strain to begin urination? 0 - Not at all   7) How many times did you most typically get up to urinate from the time you went to bed until the time you got up in the morning? 1 - 1 time   Total Score:  11           OBJECTIVE:   /90   Pulse 76   Temp 97 6 °F (36 4 °C)   Resp 16   Wt 98 9 kg (218 lb)   SpO2 95%   BMI 32 19 kg/m²   Performance Status: Karnofsky: 90 - Able to carry on normal activity; minor signs or symptoms of disease     Physical Exam  Vitals and nursing note reviewed  Constitutional:       General: He is not in acute distress  Appearance: He is well-developed     Eyes: General: No scleral icterus  Cardiovascular:      Rate and Rhythm: Normal rate and regular rhythm  Pulmonary:      Breath sounds: No wheezing, rhonchi or rales  Chest:   Breasts:      Right: No supraclavicular adenopathy  Left: No supraclavicular adenopathy  Abdominal:      General: There is no distension  Palpations: Abdomen is soft  Tenderness: There is no abdominal tenderness  Genitourinary:     Comments: Patient deferred ELENA today  Musculoskeletal:      Right lower leg: No edema  Left lower leg: No edema  Comments: No CVA or spinal tenderness to percussion  Lymphadenopathy:      Cervical: No cervical adenopathy  Upper Body:      Right upper body: No supraclavicular adenopathy  Left upper body: No supraclavicular adenopathy  Lower Body: No right inguinal adenopathy  No left inguinal adenopathy  Neurological:      Mental Status: He is alert and oriented to person, place, and time  Gait: Gait normal           RESULTS  Lab Results  Lab Results   Component Value Date    PSA 5 0 (H) 06/17/2022    PSA 3 6 11/26/2021    PSA 3 0 10/13/2020       Pathology:   Collected 9/29/2022 11:51     Status: Final result     Visible to patient: Yes (seen)     Dx:  Elevated PSA     0 Result Notes    Component    Case Report   Surgical Pathology Report                         Case: [id number]                                    Authorizing Provider: Wil Cunningham MD    Collected:           09/29/2022 1151               Ordering Location:     [facility]        Received:            09/29/2022 1151                                      Urology Rudy                                                                Pathologist:           Álvaro Coughlin MD                                                      Specimens:   A) - Prostate, left lateral mid                                                                      B) - Prostate, left lateral base                                                                     C) - Prostate, left lateral apex                                                                     D) - Prostate, left  apex                                                                            E) - Prostate, left mid                                                                              F) - Prostate, left base                                                                             G) - Prostate, right lateral apex                                                                    H) - Prostate, right lateral base                                                                    I) - Prostate, right lateral mid                                                                     J) - Prostate, right base                                                                            K) - Prostate, right mid                                                                             L) - Prostate, right apex                                                                  Final Diagnosis   A  Prostate, left lateral mid:  - Benign prostatic tissue   - Negative for malignancy       B  Prostate, left lateral base:  - Histologic Type: Acinar adenocarcinoma  - [place] score: 3 + 3 = 6  - Involvement: Involving 60% of 1 of 1 core  - Perineural invasion: Absent     -  Multiplex immunohistochemical stain performed with appropriate controls shows malignant glands with loss of basal layer cells staining for p63 and high molecular weight keratin with luminal racemase expression, supporting the diagnosis      C  Prostate, left lateral apex:  - Benign prostatic tissue   - Negative for malignancy      -  Multiplex immunohistochemical stain performed with appropriate controls shows benign glands with intact basal layer cells staining for p63 and high molecular weight keratin with negative luminal racemase expression, supporting the diagnosis      D   Prostate, left  apex:  - Benign prostatic tissue   - Negative for malignancy        E  Prostate, left mid:  - Benign prostatic tissue   - Negative for malignancy       F  Prostate, left base:  - Histologic Type: Acinar adenocarcinoma  - [place] score: 3 + 3 = 6  - Involvement: Involving 10% of 1 of 1 core  - Perineural invasion: Absent     G  Prostate, right lateral apex:  - Histologic Type: Acinar adenocarcinoma  - [place] score: 3 + 3 = 6  - Involvement: Involving <5% of 1 of 1 core  - Perineural invasion: Absent     H  Prostate, right lateral base:  - Benign prostatic tissue   - Negative for malignancy       I  Prostate, right lateral mid:  - Benign prostatic tissue   - Negative for malignancy        J  Prostate, right base:  - Benign prostatic tissue   - Negative for malignancy       K  Prostate, right mid:  - Benign prostatic tissue   - Negative for malignancy       L  Prostate, right apex:  - Histologic Type: Acinar adenocarcinoma  - [place] score: 3 + 3 = 6  - Involvement: Involving 10% of 1 of 1 core  - Perineural invasion: Absent    Electronically signed by Karl Moore MD on 10/3/2022 at 1045   Comments: This is an appended report  These results have been appended to a previously preliminary verified report  ASSESSMENT  1  Prostate cancer [facility])  Ambulatory Referral to Radiation Oncology     Cancer Staging  Prostate cancer [facility])  Staging form: Prostate, AJCC 8th Edition  - Clinical: cT1c, cN0, cM0, Grade Group: 1 - Unsigned  Histologic grading system: 5 grade system        PLAN/DISCUSSION    Berta Gil is a 57y o  year old male with strong family history of prostate cancer and past medical history significant for diabetes and BPH who was recently diagnosed with clinical stage T1c prostatic adenocarcinoma, [place] score 6 (3+3) involving 4/12 cores  Maximum core involvement 60% in 1 core, remaining cores 10% or less  Pretreatment PSA was 5 0 ng/mL    He is interested in definitive treatment option and presents today for discussion of options  The patient's clinical presentation is consistent with low risk disease as per AUA/MANNIE risk stratification guidelines  The patient's prostate is notably enlarged measuring 94 cc on transrectal ultrasound  We discussed risk stratification and natural history of prostate cancer which is slow growing  We reviewed treatment options including active surveillance, surgery, and radiation  Given his presentation, active surveillance would be a reasonable option for the patient  Given his age, it is expected that he will eventually require definitive treatment, however, this could be delayed for potentially years with likely little to no impact on his prognosis when treatment is initiated  There is some risk of upstaging which may require more aggressive therapy, such as the inclusion of ADT and may impact his prognosis but with compliance to active surveillance protocols, the risk is small to moderate  We discussed surgery briefly, but defer to Dr Carmel Hernandez for detailed discussion  The patient did discuss some surgical options, but would require review if he decided to pursue this course  We discussed definitive radiation treatment options  Given his low risk disease he would be a candidate for radioactive seed implantation, conventional radiation, hypo fractionated radiation both moderate and extreme  Given the size of his prostate and IPSS score, I do not feel that he is a good candidate for SBRT or brachytherapy  I explained that we do not perform brachytherapy at [address] [place] [address] and that the final decision regarding his ability to undergo implant would be determined by the treating physician  We discussed the procedure and the pros and cons compared to external beam radiation therapy    I recommended referral to [facility] with HDR prostate brachytherapy or [facility] or mild [place] with both HDR and LDR capabilities if he wish to pursue this option further  If you wish to pursue definitive treatment at this time, I offered the patient definitive radiation to the prostate proximal seminal vesicles with a dose of 79 2 Gy  I feel that this would offer the patient benefit in terms of local control and potential cure  We would evaluate him for moderate hypofractionation and 70 Gy in 28 fractions, however, given the size of his prostate achieving normal tissue dose limitations will be challenging  The rationale and potential benefits, as well as the risks and acute and late side effects and potential toxicities of radiation were discussed with the patient and his wife at length  Side effects discussed included, but were not limited to: Fatigue, irritative urinary side effects, diarrhea, rectal urgency, radiation proctitis, erectile dysfunction, and radiation cystitis  Should he decide to pursue definitive radiation, the patient is an excellent candidate for SpaceOAR  The patient and his wife are both interested in pursuing this option if he decided to pursue radiation  Would also recommend fiducial markers for targeting on a daily basis  We briefly discussed the pros and cons of external beam radiation versus surgery  This included salvage options if there is local failure and the benefit of removal of the enlarged prostate regarding long-term obstructive symptoms  The patient was prescribed finasteride by Urology given the size of his prostate and obstructive bladder symptoms  He did not take this medication, as he was very concerned regarding side effects  We discussed that BPH and prostate cancer are not directly related  BPH can be treated separately with medication and or surgical procedures such as TURP  He had some questions regarding how PSA level is reflected by prostate size    We discussed that larger prostates naturally produced more PSA given that there are more prostate cells and the scan increased the baseline of some patients  Once prostate cancer is diagnosed, however, PSA would be evaluated from that level  If the patient decides to initiate finasteride this can cause a decline in PSA due to medication, however, surveillance would be pursued at the new lower level and increase rate as well as absolute number would be monitored over time  The patient was given the opportunity to ask questions and all questions were answered to his satisfaction  They are currently undecided whether to pursue treatment versus active surveillance  They are leaning towards definitive treatment at this time but will consider further  They will return to Urology for follow-up discussion and decisions  We ask that Urology contact our office to coordinate SpaceOAR and fiducial marker placement should they decide to pursue radiation treatments  The patient agreed with this plan  I spent 49 minutes reviewing the records (labs, clinician notes, outside records, medical history, ordering medicine/tests/procedures, interpreting the imaging/labs previously done) and coordination of care as well as direct time with the patient today, of which greater than 50% of the time was spent in counseling and coordination of care with the patient/family  Julio Birmingham  10/31/2022,2:33 PM      Portions of the record may have been created with voice recognition software  Occasional wrong word or "sound a like" substitutions may have occurred due to the inherent limitations of voice recognition software  Read the chart carefully and recognize, using context, where substitutions have occurred

## 2022-10-31 NOTE — LETTER
2022     Laurita [address]ra[address]  [facility] [id number]    Patient: Luis Enrique Owen   YOB: 1960   Date of Visit: 10/31/2022       Dear Dr Carmel Hernandez: Thank you for referring Meenu Velazquez to me for evaluation  Below are my notes for this consultation  If you have questions, please do not hesitate to call me  I look forward to following your patient along with you  Sincerely,        Ange Navarro MD        CC: No Recipients  Ange Navarro MD  2022 12:08 PM  Sign when Signing Visit  Consultation - Radiation Oncology      [id number] : 1960  Encounter: [id number]  Patient Information: [address]  Chief Complaint   Patient presents with   • Prostate Cancer   • Consult     Cancer Staging  Prostate cancer [facility])  Staging form: Prostate, AJCC 8th Edition  - Clinical: cT1c, cN0, cM0, Grade Group: 1 - Unsigned  Histologic grading system: 5 grade system         History of Present Illness   Luis Enrique Owen is a 57 y o male with family history significant for identical twin brother and father with prostate cancer and past medical history significant for DM and CKD who was recently diagnosed with low risk prostate cancer  He presents today to discuss definitive radiation treatment options  Briefly, the patient was noted with elevated PSA of 5 1 on screening from 22  Lab Results   Component Value Date    PSA 5 0 (H) 2022    PSA 3 6 2021    PSA 3 0 10/13/2020        9/29/22 TRUS prostate biopsy  Digital rectal exam findings:  - no palpable concerns   Ultrasound size measurements:  -Volume:        94 cm3  Pathology demonstrated prostatic adenocarcinoma, W[place] score 6 (3+3) involving 4/12 cores  Specifically the left lateral base, left base, right lateral apex, and right apex cores demonstrated between less than 5 to 60% tissue involvement of the submitted cores    There was no perineural invasion        10/20/22 Dr Jasmyn Gagnon  cT1c, grade group 1 (Yeni 3+3=6 disease, 4 of 12 total positive cores-left medial base, left medial base, right lateral apex, right medial apex)   Maximum core positivity: 10%  - pre treatment PSA:  5 0  Recommended active surveillance as the standard of care for Yeni 6 disease  Patient is interested in definitite upfront treatment     Does not want surgery     Twin brother was treated with radiation he would like to meet with a radiation oncologist to discuss definitive local treatment     Refer to Rad Onc  Follow up 6 months with PSA PTV    Currently, the patient generally feels well  He has baseline urinary frequency and urgency as well as a weak urinary stream   IPSS score 11  He notes that he was recently diagnosed with candidal infection of the urination associated with new medications for his diabetes  He is under treatment for this  He denies any prior dysuria, hematuria, or urinary incontinence  He has periodic diarrhea associated with metformin intake  He denies any history of rectal bleeding  States that he is had a colonoscopy approximately 10+ years ago which was normal   He is sexually active insert for his erectile dysfunction  He uses Viagra to good effect  [facility] ([facility]) erectile function score 1-2/3 increase to 2-3/3 with phosphodiesterase inhibitor       [facility]    Historical Information   Oncology History   Prostate cancer [facility])   2022 Biopsy      Prostate, left lateral mid:  - Benign prostatic tissue   - Negative for malignancy  B  Prostate, left lateral base:  - Histologic Type: Acinar adenocarcinoma  - Blakeslee score: 3 + 3 = 6  - Involvement: Involving 60% of 1 of 1 core    - Perineural invasion: Absent     -  Multiplex immunohistochemical stain performed with appropriate controls shows malignant glands with loss of basal layer cells staining for p63 and high molecular weight keratin with luminal racemase expression, supporting the diagnosis  C  Prostate, left lateral apex:  - Benign prostatic tissue   - Negative for malignancy  -  Multiplex immunohistochemical stain performed with appropriate controls shows benign glands with intact basal layer cells staining for p63 and high molecular weight keratin with negative luminal racemase expression, supporting the diagnosis  D  Prostate, left  apex:  - Benign prostatic tissue   - Negative for malignancy  E  Prostate, left mid:  - Benign prostatic tissue   - Negative for malignancy  F  Prostate, left base:  - Histologic Type: Acinar adenocarcinoma  - Pansey score: 3 + 3 = 6  - Involvement: Involving 10% of 1 of 1 core  - Perineural invasion: Absent     G  Prostate, right lateral apex:  - Histologic Type: Acinar adenocarcinoma  - Yeni score: 3 + 3 = 6  - Involvement: Involving <5% of 1 of 1 core  - Perineural invasion: Absent     H  Prostate, right lateral base:  - Benign prostatic tissue   - Negative for malignancy  I  Prostate, right lateral mid:  - Benign prostatic tissue   - Negative for malignancy  J  Prostate, right base:  - Benign prostatic tissue   - Negative for malignancy  K  Prostate, right mid:  - Benign prostatic tissue   - Negative for malignancy  L  Prostate, right apex:  - Histologic Type: Acinar adenocarcinoma  - Yeni score: 3 + 3 = 6  - Involvement: Involving 10% of 1 of 1 core    - Perineural invasion: Absent      10/24/2022 Initial Diagnosis    Prostate cancer [facility])           Past Medical History:   Diagnosis Date   • Asthma    • Benign prostatic hyperplasia    • Chronic kidney disease    • Diabetes mellitus ([facility] [address] )    • Family history of prostate cancer    • Hypertension    • Kidney stone    • Mixed hyperlipidemia    • Prostate cancer [facility])      Past Surgical History:   Procedure Laterality Date   • CHOLECYSTECTOMY     • PROSTATE BIOPSY     • ROTATOR CUFF REPAIR     • SHOULDER SURGERY  2015       Family History Problem Relation Age of Onset   • Hypertension Mother    • Diabetes Mother    • Cancer Father    • Hypertension Father    • Prostate cancer Father    • Hypertension Brother        Social History   Social History     Substance and Sexual Activity   Alcohol Use Yes    Comment: sOCIAL     Social History     Substance and Sexual Activity   Drug Use Yes   • Types: Marijuana    Comment: has vape pen     Social History     Tobacco Use   Smoking Status Never Smoker   Smokeless Tobacco Never Used         Meds/Allergies     Current Outpatient Medications:   •  amLODIPine (NORVASC) 5 mg tablet, Take 1 tablet (5 mg total) by mouth daily, Disp: 90 tablet, Rfl: 3  •  aspirin (ECOTRIN LOW STRENGTH) 81 mg EC tablet, Take 81 mg by mouth daily, Disp: , Rfl:   •  atorvastatin (LIPITOR) 20 mg tablet, Take 1 tablet (20 mg total) by mouth daily, Disp: 90 tablet, Rfl: 3  •  glucose blood test strip, Test once daily or as directed, Disp: 100 each, Rfl: 2  •  Lancets (onetouch ultrasoft) lancets, Test once daily or as directed, Disp: 100 each, Rfl: 3  •  lisinopril (ZESTRIL) 40 mg tablet, Take 1 tablet (40 mg total) by mouth daily, Disp: 30 tablet, Rfl: 6  •  metFORMIN (GLUCOPHAGE-XR) 500 mg 24 hr tablet, TAKE 4 TABLETS BY MOUTH ONCE DAILY (Patient taking differently: 2 (two) times a day with meals TAKE 2 TABLETS BY MOUTH BID), Disp: 360 tablet, Rfl: 0  •  metoprolol succinate (TOPROL-XL) 25 mg 24 hr tablet, TAKE 1 & 1/2 (ONE & ONE-HALF) TABLETS BY MOUTH AT BEDTIME, Disp: 45 tablet, Rfl: 6  •  montelukast (SINGULAIR) 10 mg tablet, Take 1 tablet (10 mg total) by mouth daily at bedtime, Disp: 30 tablet, Rfl: 6  •  omega-3-acid ethyl esters (LOVAZA) 1 g capsule, Take 1 capsule (1 g total) by mouth in the morning and 1 capsule (1 g total) in the evening , Disp: 180 capsule, Rfl: 0  •  sildenafil (REVATIO) 20 mg tablet, TAKE 2 TABLETS BY MOUTH ONCE DAILY 1  HOUR  BEFORE  INTERCOURSE , Disp: 30 tablet, Rfl: 0  •  bisacodyl (FLEET) 10 MG/30ML ENEM, Insert 30 mL (10 mg total) into the rectum once for 1 dose   Use morning of prostate biopsy, Disp: 30 mL, Rfl: 0  •  Dapagliflozin Propanediol (Farxiga) 5 MG TABS, Take 1 tablet (5 mg total) by mouth daily (Patient not taking: Reported on 10/31/2022), Disp: 100 tablet, Rfl: 3  •  finasteride (PROSCAR) 5 mg tablet, Take 1 tablet (5 mg total) by mouth daily (Patient not taking: Reported on 10/31/2022), Disp: 90 tablet, Rfl: 3  •  valACYclovir (VALTREX) 1,000 mg tablet, Take 1 tablet (1,000 mg total) by mouth 3 (three) times a day for 7 days, Disp: 21 tablet, Rfl: 0  No Known Allergies      Review of Systems   Constitutional: Positive for fatigue  HENT: Positive for dental problem (needs dental extraction) and tinnitus  Eyes:        Wears glasses  Sees floaters- follows with ophthamologist   Respiratory: Positive for wheezing  Asthma   Cardiovascular: Positive for leg swelling (left ankle; occasional)  Gastrointestinal: Positive for diarrhea  Genitourinary: Positive for frequency  Musculoskeletal: Positive for arthralgias (B/L shoulders; left wrist)  Skin: Negative  Allergic/Immunologic: Negative  Neurological: Negative  Hematological: Negative  Psychiatric/Behavioral: Negative           Clinical Trial: no     IPSS Questionnaire (AUA-7): Over the past month…     1)  How often have you had a sensation of not emptying your bladder completely after you finish urinating? 1 - Less than 1 time in 5   2)  How often have you had to urinate again less than two hours after you finished urinating? 1 - Less than 1 time in 5   3)  How often have you found you stopped and started again several times when you urinated? 1 - Less than 1 time in 5   4) How difficult have you found it to postpone urination? 2 - Less than half the time   5) How often have you had a weak urinary stream?  5 - Almost always   6) How often have you had to push or strain to begin urination?   0 - Not at all   7) How many times did you most typically get up to urinate from the time you went to bed until the time you got up in the morning? 1 - 1 time   Total Score:  11           OBJECTIVE:   /90   Pulse 76   Temp 97 6 °F (36 4 °C)   Resp 16   Wt 98 9 kg (218 lb)   SpO2 95%   BMI 32 19 kg/m²   Performance Status: Karnofsky: 90 - Able to carry on normal activity; minor signs or symptoms of disease     Physical Exam  Vitals and nursing note reviewed  Constitutional:       General: He is not in acute distress  Appearance: He is well-developed  Eyes:      General: No scleral icterus  Cardiovascular:      Rate and Rhythm: Normal rate and regular rhythm  Pulmonary:      Breath sounds: No wheezing, rhonchi or rales  Chest:   Breasts:      Right: No supraclavicular adenopathy  Left: No supraclavicular adenopathy  Abdominal:      General: There is no distension  Palpations: Abdomen is soft  Tenderness: There is no abdominal tenderness  Genitourinary:     Comments: Patient deferred ELENA today  Musculoskeletal:      Right lower leg: No edema  Left lower leg: No edema  Comments: No CVA or spinal tenderness to percussion  Lymphadenopathy:      Cervical: No cervical adenopathy  Upper Body:      Right upper body: No supraclavicular adenopathy  Left upper body: No supraclavicular adenopathy  Lower Body: No right inguinal adenopathy  No left inguinal adenopathy  Neurological:      Mental Status: He is alert and oriented to person, place, and time  Gait: Gait normal           RESULTS  Lab Results  Lab Results   Component Value Date    PSA 5 0 (H) 06/17/2022    PSA 3 6 11/26/2021    PSA 3 0 10/13/2020       Pathology:   Collected 9/29/2022 11:51     Status: Final result     Visible to patient: Yes (seen)     Dx:  Elevated PSA     0 Result Notes    Component    Case Report   Surgical Pathology Report                         Case: [id number]                                 Authorizing Provider: Juan C Pedraza MD    Collected:           09/29/2022 1151               Ordering Location:     [facility]        Received:            09/29/2022 1151                                      Urology [facility]                                                                Pathologist:           Tamar Diane MD                                                      Specimens:   A) - Prostate, left lateral mid                                                                      B) - Prostate, left lateral base                                                                     C) - Prostate, left lateral apex                                                                     D) - Prostate, left  apex                                                                            E) - Prostate, left mid                                                                              F) - Prostate, left base                                                                             G) - Prostate, right lateral apex                                                                    H) - Prostate, right lateral base                                                                    I) - Prostate, right lateral mid                                                                     J) - Prostate, right base                                                                            K) - Prostate, right mid                                                                             L) - Prostate, right apex                                                                  Final Diagnosis   A  Prostate, left lateral mid:  - Benign prostatic tissue   - Negative for malignancy       B  Prostate, left lateral base:  - Histologic Type: Acinar adenocarcinoma  - Yeni score: 3 + 3 = 6  - Involvement: Involving 60% of 1 of 1 core    - Perineural invasion: Absent     -  Multiplex immunohistochemical stain performed with appropriate controls shows malignant glands with loss of basal layer cells staining for p63 and high molecular weight keratin with luminal racemase expression, supporting the diagnosis      C  Prostate, left lateral apex:  - Benign prostatic tissue   - Negative for malignancy      -  Multiplex immunohistochemical stain performed with appropriate controls shows benign glands with intact basal layer cells staining for p63 and high molecular weight keratin with negative luminal racemase expression, supporting the diagnosis      D  Prostate, left  apex:  - Benign prostatic tissue   - Negative for malignancy        E  Prostate, left mid:  - Benign prostatic tissue   - Negative for malignancy       F  Prostate, left base:  - Histologic Type: Acinar adenocarcinoma  - Wabeno score: 3 + 3 = 6  - Involvement: Involving 10% of 1 of 1 core  - Perineural invasion: Absent     G  Prostate, right lateral apex:  - Histologic Type: Acinar adenocarcinoma  - Yeni score: 3 + 3 = 6  - Involvement: Involving <5% of 1 of 1 core  - Perineural invasion: Absent     H  Prostate, right lateral base:  - Benign prostatic tissue   - Negative for malignancy       I  Prostate, right lateral mid:  - Benign prostatic tissue   - Negative for malignancy        J  Prostate, right base:  - Benign prostatic tissue   - Negative for malignancy       K  Prostate, right mid:  - Benign prostatic tissue   - Negative for malignancy       L  Prostate, right apex:  - Histologic Type: Acinar adenocarcinoma  - Wabeno score: 3 + 3 = 6  - Involvement: Involving 10% of 1 of 1 core  - Perineural invasion: Absent    Electronically signed by Emilie Corbin MD on 10/3/2022 at 1045   Comments: This is an appended report  These results have been appended to a previously preliminary verified report  ASSESSMENT  1   Prostate cancer [facility])  Ambulatory Referral to Radiation Oncology     Cancer Staging  Prostate cancer [facility])  Staging form: Prostate, AJCC 8th Edition  - Clinical: cT1c, cN0, cM0, Grade Group: 1 - Unsigned  Histologic grading system: 5 grade system        PLAN/DISCUSSION    Daymon Snellen is a 57y o  year old male with strong family history of prostate cancer and past medical history significant for diabetes and BPH who was recently diagnosed with clinical stage T1c prostatic adenocarcinoma, Yeni score 6 (3+3) involving 4/12 cores  Maximum core involvement 60% in 1 core, remaining cores 10% or less  Pretreatment PSA was 5 0 ng/mL  He is interested in definitive treatment option and presents today for discussion of options  The patient's clinical presentation is consistent with low risk disease as per AUA/MANNIE risk stratification guidelines  The patient's prostate is notably enlarged measuring 94 cc on transrectal ultrasound  We discussed risk stratification and natural history of prostate cancer which is slow growing  We reviewed treatment options including active surveillance, surgery, and radiation  Given his presentation, active surveillance would be a reasonable option for the patient  Given his age, it is expected that he will eventually require definitive treatment, however, this could be delayed for potentially years with likely little to no impact on his prognosis when treatment is initiated  There is some risk of upstaging which may require more aggressive therapy, such as the inclusion of ADT and may impact his prognosis but with compliance to active surveillance protocols, the risk is small to moderate  We discussed surgery briefly, but defer to Dr Adrian Mason for detailed discussion  The patient did discuss some surgical options, but would require review if he decided to pursue this course  We discussed definitive radiation treatment options    Given his low risk disease he would be a candidate for radioactive seed implantation, conventional radiation, hypo fractionated radiation both moderate and extreme  Given the size of his prostate and IPSS score, I do not feel that he is a good candidate for SBRT or brachytherapy  I explained that we do not perform brachytherapy at [facility] and that the final decision regarding his ability to undergo implant would be determined by the treating physician  We discussed the procedure and the pros and cons compared to external beam radiation therapy  I recommended referral to [facility] with HDR prostate brachytherapy or [facility] or mild [place] with both HDR and LDR capabilities if he wish to pursue this option further  If you wish to pursue definitive treatment at this time, I offered the patient definitive radiation to the prostate proximal seminal vesicles with a dose of 79 2 Gy  I feel that this would offer the patient benefit in terms of local control and potential cure  We would evaluate him for moderate hypofractionation and 70 Gy in 28 fractions, however, given the size of his prostate achieving normal tissue dose limitations will be challenging  The rationale and potential benefits, as well as the risks and acute and late side effects and potential toxicities of radiation were discussed with the patient and his wife at length  Side effects discussed included, but were not limited to: Fatigue, irritative urinary side effects, diarrhea, rectal urgency, radiation proctitis, erectile dysfunction, and radiation cystitis  Should he decide to pursue definitive radiation, the patient is an excellent candidate for SpaceOAR  The patient and his wife are both interested in pursuing this option if he decided to pursue radiation  Would also recommend fiducial markers for targeting on a daily basis  We briefly discussed the pros and cons of external beam radiation versus surgery    This included salvage options if there is local failure and the benefit of removal of the enlarged prostate regarding long-term obstructive symptoms  The patient was prescribed finasteride by Urology given the size of his prostate and obstructive bladder symptoms  He did not take this medication, as he was very concerned regarding side effects  We discussed that BPH and prostate cancer are not directly related  BPH can be treated separately with medication and or surgical procedures such as TURP  He had some questions regarding how PSA level is reflected by prostate size  We discussed that larger prostates naturally produced more PSA given that there are more prostate cells and the scan increased the baseline of some patients  Once prostate cancer is diagnosed, however, PSA would be evaluated from that level  If the patient decides to initiate finasteride this can cause a decline in PSA due to medication, however, surveillance would be pursued at the new lower level and increase rate as well as absolute number would be monitored over time  The patient was given the opportunity to ask questions and all questions were answered to his satisfaction  They are currently undecided whether to pursue treatment versus active surveillance  They are leaning towards definitive treatment at this time but will consider further  They will return to Urology for follow-up discussion and decisions  We ask that Urology contact our office to coordinate SpaceOAR and fiducial marker placement should they decide to pursue radiation treatments  The patient agreed with this plan  I spent 49 minutes reviewing the records (labs, clinician notes, outside records, medical history, ordering medicine/tests/procedures, interpreting the imaging/labs previously done) and coordination of care as well as direct time with the patient today, of which greater than 50% of the time was spent in counseling and coordination of care with the patient/family            Juan Marie  10/31/2022,2:33 PM      Portions of the record may have been created with voice recognition software  Occasional wrong word or "sound a like" substitutions may have occurred due to the inherent limitations of voice recognition software  Read the chart carefully and recognize, using context, where substitutions have occurred

## 2022-10-31 NOTE — PROGRESS NOTES
Daryn Black 1960 is a 57 y o  male who was recently diagnosed with low risk prostate cancer, Yeni 3+3=6  His PSA is 5 0 ng/ml  He has a significant family history of prostate cancer which includes his father and twin brother  Active surveillance was recommended however Lucila Grover is interested in upfront treatment  He is being referred by Dr Padma Interiano and presents today for consult  22 Urology  recent PSA from 22 has elevated to 5 0  Previous PSAs were within normal range   Reviewed recommendations for proceeding with prostate biopsy given PSA elevation     22 TRUS prostate biopsy  Digital rectal exam findings:  - no palpable concerns   Ultrasound size measurements:  -Volume:        94 cm3      10/20/22 Dr Padma Interiano  cT1c, grade group 1 (Painter 3+3=6 disease, 4 of 12 total positive cores-left medial base, left medial base, right lateral apex, right medial apex)  Maximum core positivity: 10%  - pre treatment PSA:  5 0  Recommended active surveillance as the standard of care for Painter 6 disease  Patient is interested in definitite upfront treatment  Does not want surgery  Twin brother was treated with radiation he would like to meet with a radiation oncologist to discuss definitive local treatment  Refer to Rad Onc  Follow up 6 months with PSA PTV    Component PSA, Total   Latest Ref Rng & Units 0 0 - 4 0 ng/mL   10/13/2020 3 0   2021 3 6   2022 5 0 (H)       Upcomin23 Urology      Oncology History   Prostate cancer ([facility] [address] )   2022 Biopsy      Prostate, left lateral mid:  - Benign prostatic tissue   - Negative for malignancy  B  Prostate, left lateral base:  - Histologic Type: Acinar adenocarcinoma  - Yeni score: 3 + 3 = 6  - Involvement: Involving 60% of 1 of 1 core    - Perineural invasion: Absent     -  Multiplex immunohistochemical stain performed with appropriate controls shows malignant glands with loss of basal layer cells staining for p63 and high molecular weight keratin with luminal racemase expression, supporting the diagnosis  C  Prostate, left lateral apex:  - Benign prostatic tissue   - Negative for malignancy  -  Multiplex immunohistochemical stain performed with appropriate controls shows benign glands with intact basal layer cells staining for p63 and high molecular weight keratin with negative luminal racemase expression, supporting the diagnosis  D  Prostate, left  apex:  - Benign prostatic tissue   - Negative for malignancy  E  Prostate, left mid:  - Benign prostatic tissue   - Negative for malignancy  F  Prostate, left base:  - Histologic Type: Acinar adenocarcinoma  - Yeni score: 3 + 3 = 6  - Involvement: Involving 10% of 1 of 1 core  - Perineural invasion: Absent     G  Prostate, right lateral apex:  - Histologic Type: Acinar adenocarcinoma  - [place] score: 3 + 3 = 6  - Involvement: Involving <5% of 1 of 1 core  - Perineural invasion: Absent     H  Prostate, right lateral base:  - Benign prostatic tissue   - Negative for malignancy  I  Prostate, right lateral mid:  - Benign prostatic tissue   - Negative for malignancy  J  Prostate, right base:  - Benign prostatic tissue   - Negative for malignancy  K  Prostate, right mid:  - Benign prostatic tissue   - Negative for malignancy  L  Prostate, right apex:  - Histologic Type: Acinar adenocarcinoma  - Yeni score: 3 + 3 = 6  - Involvement: Involving 10% of 1 of 1 core  - Perineural invasion: Absent      10/24/2022 Initial Diagnosis    Prostate cancer ([facility] [address] )         Review of Systems:  Review of Systems   Constitutional: Positive for fatigue  HENT: Positive for dental problem (needs dental extraction) and tinnitus  Eyes:        Wears glasses  Sees floaters- follows with ophthamologist   Respiratory: Positive for wheezing  Asthma   Cardiovascular: Positive for leg swelling (left ankle; occasional)     Gastrointestinal: Positive for diarrhea  Genitourinary: Positive for frequency  Musculoskeletal: Positive for arthralgias (B/L shoulders; left wrist)  Skin: Negative  Allergic/Immunologic: Negative  Neurological: Negative  Hematological: Negative  Psychiatric/Behavioral: Negative  Clinical Trial: no    IPSS Questionnaire (AUA-7): Over the past month…    1)  How often have you had a sensation of not emptying your bladder completely after you finish urinating? 1 - Less than 1 time in 5   2)  How often have you had to urinate again less than two hours after you finished urinating? 1 - Less than 1 time in 5   3)  How often have you found you stopped and started again several times when you urinated? 1 - Less than 1 time in 5   4) How difficult have you found it to postpone urination? 2 - Less than half the time   5) How often have you had a weak urinary stream?  5 - Almost always   6) How often have you had to push or strain to begin urination? 0 - Not at all   7) How many times did you most typically get up to urinate from the time you went to bed until the time you got up in the morning?   1 - 1 time   Total Score:  11       Pain assessment: 0    PFT N/A    Prior Radiation no    Teaching NIH book, side effects, SIM    MST yes; no referral    Implantable Devices (Port, pacemaker, pain stimulator) no    Hip Replacement no    Covid Vaccine Status yes, no booster    Health Maintenance   Topic Date Due   • Pneumococcal Vaccine: Pediatrics (0 to 5 Years) and At-Risk Patients (6 to 59 Years) (1 - PCV) Never done   • Annual Physical  Never done   • Diabetic Foot Exam  03/26/2020   • BMI: Followup Plan  01/23/2021   • COVID-19 Vaccine (3 - Booster for Moderna series) 10/17/2021   • DM Eye Exam  10/28/2021   • Influenza Vaccine (1) Never done   • Colorectal Cancer Screening  10/29/2022   • URINE MICROALBUMIN  11/26/2022   • HEMOGLOBIN A1C  02/05/2023   • Depression Screening  08/05/2023   • BMI: Adult  10/20/2023   • DTaP,Tdap,and Td Vaccines (2 - Td or Tdap) 10/05/2031   • HIV Screening  Completed   • Hepatitis C Screening  Completed   • HIB Vaccine  Aged Out   • Hepatitis B Vaccine  Aged Out   • IPV Vaccine  Aged Out   • Hepatitis A Vaccine  Aged Out   • Meningococcal ACWY Vaccine  Aged Out   • HPV Vaccine  Aged Out       Past Medical History:   Diagnosis Date   • Asthma    • Benign prostatic hyperplasia    • Chronic kidney disease    • Diabetes mellitus ([facility] [address] )    • Family history of prostate cancer    • Hypertension    • Kidney stone    • Mixed hyperlipidemia        Past Surgical History:   Procedure Laterality Date   • CHOLECYSTECTOMY     • ROTATOR CUFF REPAIR     • SHOULDER SURGERY  2015       Family History   Problem Relation Age of Onset   • Hypertension Mother    • Diabetes Mother    • Cancer Father    • Hypertension Father    • Prostate cancer Father    • Hypertension Brother        Social History     Tobacco Use   • Smoking status: Never Smoker   • Smokeless tobacco: Never Used   Vaping Use   • Vaping Use: Never used   Substance Use Topics   • Alcohol use: Yes     Comment: sOCIAL   • Drug use: Yes     Types: Marijuana     Comment: has vape pen          Current Outpatient Medications:   •  amLODIPine (NORVASC) 5 mg tablet, Take 1 tablet (5 mg total) by mouth daily, Disp: 90 tablet, Rfl: 3  •  aspirin (ECOTRIN LOW STRENGTH) 81 mg EC tablet, Take 81 mg by mouth daily, Disp: , Rfl:   •  atorvastatin (LIPITOR) 20 mg tablet, Take 1 tablet (20 mg total) by mouth daily, Disp: 90 tablet, Rfl: 3  •  bisacodyl (FLEET) 10 MG/30ML ENEM, Insert 30 mL (10 mg total) into the rectum once for 1 dose    Use morning of prostate biopsy, Disp: 30 mL, Rfl: 0  •  Dapagliflozin Propanediol (Farxiga) 5 MG TABS, Take 1 tablet (5 mg total) by mouth daily, Disp: 100 tablet, Rfl: 3  •  finasteride (PROSCAR) 5 mg tablet, Take 1 tablet (5 mg total) by mouth daily, Disp: 90 tablet, Rfl: 3  •  glucose blood test strip, Test once daily or as directed, Disp: 100 each, Rfl: 2  •  Lancets (onetouch ultrasoft) lancets, Test once daily or as directed, Disp: 100 each, Rfl: 3  •  lisinopril (ZESTRIL) 40 mg tablet, Take 1 tablet (40 mg total) by mouth daily, Disp: 30 tablet, Rfl: 6  •  metFORMIN (GLUCOPHAGE-XR) 500 mg 24 hr tablet, TAKE 4 TABLETS BY MOUTH ONCE DAILY, Disp: 360 tablet, Rfl: 0  •  metoprolol succinate (TOPROL-XL) 25 mg 24 hr tablet, TAKE 1 & 1/2 (ONE & ONE-HALF) TABLETS BY MOUTH AT BEDTIME, Disp: 45 tablet, Rfl: 6  •  montelukast (SINGULAIR) 10 mg tablet, Take 1 tablet (10 mg total) by mouth daily at bedtime, Disp: 30 tablet, Rfl: 6  •  omega-3-acid ethyl esters (LOVAZA) 1 g capsule, Take 1 capsule (1 g total) by mouth in the morning and 1 capsule (1 g total) in the evening , Disp: 180 capsule, Rfl: 0  •  sildenafil (REVATIO) 20 mg tablet, TAKE 2 TABLETS BY MOUTH ONCE DAILY 1  HOUR  BEFORE  INTERCOURSE , Disp: 30 tablet, Rfl: 0  •  valACYclovir (VALTREX) 1,000 mg tablet, Take 1 tablet (1,000 mg total) by mouth 3 (three) times a day for 7 days, Disp: 21 tablet, Rfl: 0    No Known Allergies     There were no vitals filed for this visit

## 2022-11-01 ENCOUNTER — OFFICE VISIT (OUTPATIENT)
Dept: INTERNAL MEDICINE CLINIC | Facility: CLINIC | Age: 62
End: 2022-11-01

## 2022-11-01 VITALS
SYSTOLIC BLOOD PRESSURE: 130 MMHG | DIASTOLIC BLOOD PRESSURE: 76 MMHG | BODY MASS INDEX: 32.17 KG/M2 | OXYGEN SATURATION: 95 % | RESPIRATION RATE: 18 BRPM | WEIGHT: 217.2 LBS | HEIGHT: 69 IN | TEMPERATURE: 97.5 F | HEART RATE: 71 BPM

## 2022-11-01 DIAGNOSIS — E11.69 TYPE 2 DIABETES MELLITUS WITH OTHER SPECIFIED COMPLICATION, WITHOUT LONG-TERM CURRENT USE OF INSULIN (HCC): ICD-10-CM

## 2022-11-01 DIAGNOSIS — C61 PROSTATE CANCER (HCC): ICD-10-CM

## 2022-11-01 DIAGNOSIS — N48.1 BALANITIS: ICD-10-CM

## 2022-11-01 DIAGNOSIS — Z23 ENCOUNTER FOR IMMUNIZATION: Primary | ICD-10-CM

## 2022-11-01 DIAGNOSIS — R22.2 SUBCUTANEOUS MASS OF SUPRACLAVICULAR AREA: ICD-10-CM

## 2022-11-01 RX ORDER — KETOCONAZOLE 20 MG/G
CREAM TOPICAL DAILY
Qty: 30 G | Refills: 0 | Status: SHIPPED | OUTPATIENT
Start: 2022-11-01

## 2022-11-01 RX ORDER — NEEDLES, SAFETY 18GX1 1/2"
NEEDLE, DISPOSABLE MISCELLANEOUS DAILY
Qty: 100 EACH | Refills: 0 | Status: SHIPPED | OUTPATIENT
Start: 2022-11-01 | End: 2022-11-02

## 2022-11-01 NOTE — PROGRESS NOTES
Assessment/Plan:       Diagnoses and all orders for this visit:    Encounter for immunization    Balanitis  -     ketoconazole (NIZORAL) 2 % cream; Apply topically daily    Type 2 diabetes mellitus with other specified complication, without long-term current use of insulin (HCC)  -     liraglutide (VICTOZA) injection; Inject 0 3 mL (1 8 mg total) under the skin daily  -     Discontinue: NEEDLE, DISP, 25 G (BD ECLIPSE) 25G X 1-1/2" MISC; Use in the morning  -     CBC and differential; Future  -     Comprehensive metabolic panel; Future  -     TSH, 3rd generation with Free T4 reflex; Future  -     Lipid Panel with Direct LDL reflex; Future  -     CBC and differential  -     Comprehensive metabolic panel  -     TSH, 3rd generation with Free T4 reflex  -     Lipid Panel with Direct LDL reflex    Subcutaneous mass of supraclavicular area  -     CT soft tissue neck w contrast; Future    Prostate cancer ([facility] [address] )                Subjective:      Patient ID: Sneha Fowler is a 57 y o  male  HPI    The following portions of the patient's history were reviewed and updated as appropriate:   He has a past medical history of Asthma, Benign prostatic hyperplasia, Chronic kidney disease, Diabetes mellitus ([address] ), Family history of prostate cancer, Kidney stone, Mixed hyperlipidemia, and Prostate cancer ([facility] [address] )  ,  does not have any pertinent problems on file  ,   has a past surgical history that includes Shoulder surgery (2015); Cholecystectomy; Rotator cuff repair; and Prostate biopsy  ,  family history includes Cancer in his father; Diabetes in his mother; Hypertension in his brother, father, and mother; Prostate cancer in his father  ,   reports that he has never smoked  He has never used smokeless tobacco  He reports current alcohol use  He reports current drug use  Drug: Marijuana  ,  has No Known Allergies     Current Outpatient Medications   Medication Sig Dispense Refill   • amLODIPine (NORVASC) 5 mg tablet Take 1 tablet (5 mg total) by mouth daily 90 tablet 3   • aspirin (ECOTRIN LOW STRENGTH) 81 mg EC tablet Take 81 mg by mouth daily     • atorvastatin (LIPITOR) 20 mg tablet Take 1 tablet (20 mg total) by mouth daily 90 tablet 3   • glucose blood test strip Test once daily or as directed 100 each 2   • ketoconazole (NIZORAL) 2 % cream Apply topically daily 30 g 0   • Lancets (onetouch ultrasoft) lancets Test once daily or as directed 100 each 3   • liraglutide (VICTOZA) injection Inject 0 3 mL (1 8 mg total) under the skin daily 12 mL 3   • lisinopril (ZESTRIL) 40 mg tablet Take 1 tablet (40 mg total) by mouth daily 30 tablet 6   • metFORMIN (GLUCOPHAGE-XR) 500 mg 24 hr tablet TAKE 4 TABLETS BY MOUTH ONCE DAILY (Patient taking differently: 2 (two) times a day with meals Patient taking 4 tablets daily 11/01/22) 360 tablet 0   • metoprolol succinate (TOPROL-XL) 25 mg 24 hr tablet TAKE 1 & 1/2 (ONE & ONE-HALF) TABLETS BY MOUTH AT BEDTIME 45 tablet 6   • montelukast (SINGULAIR) 10 mg tablet Take 1 tablet (10 mg total) by mouth daily at bedtime 30 tablet 6   • omega-3-acid ethyl esters (LOVAZA) 1 g capsule Take 1 capsule (1 g total) by mouth in the morning and 1 capsule (1 g total) in the evening  180 capsule 0   • sildenafil (REVATIO) 20 mg tablet TAKE 2 TABLETS BY MOUTH ONCE DAILY 1  HOUR  BEFORE  INTERCOURSE  30 tablet 0   • bisacodyl (FLEET) 10 MG/30ML ENEM Insert 30 mL (10 mg total) into the rectum once for 1 dose   Use morning of prostate biopsy 30 mL 0   • Easy Touch FlipLock Needles 25G X 1" MISC USE IN THE MORNING 100 each 0   • valACYclovir (VALTREX) 1,000 mg tablet Take 1 tablet (1,000 mg total) by mouth 3 (three) times a day for 7 days (Patient not taking: Reported on 11/1/2022) 21 tablet 0     No current facility-administered medications for this visit         Review of Systems      Objective:  Vitals:    11/01/22 1627   BP: 130/76   Pulse: 71   Resp: 18   Temp: 97 5 °F (36 4 °C)   SpO2: 95%      Physical Exam      Patient Instructions   Balanitis-a fungal infection of the penis  Stop Vedia Pae and use the cream twice daily till it is better    Diabetes:  Discontinue Farxiga and begin Victoza  Started 0 6 mg once a week, after 1 week good 1 2 mg, half to 1 week of 1 8 mg     Prostate cancer    I recommend streatment and if I were you I would choose robotic surgery    Left supraclavicular fossa mass:  Obtain a CT scan with contrast   We need to get BUN creatinine done before

## 2022-11-01 NOTE — PATIENT INSTRUCTIONS
Balanitis-a fungal infection of the penis  Stop Seema Matt and use the cream twice daily till it is better    Diabetes:  Discontinue Farxiga and begin Victoza  Started 0 6 mg once a week, after 1 week good 1 2 mg, half to 1 week of 1 8 mg     Prostate cancer    I recommend streatment and if I were you I would choose robotic surgery    Left supraclavicular fossa mass:  Obtain a CT scan with contrast   We need to get BUN creatinine done before

## 2022-11-02 RX ORDER — NEEDLES, SAFETY 25GX1"
NEEDLE, DISPOSABLE MISCELLANEOUS
Qty: 100 EACH | Refills: 0 | Status: SHIPPED | OUTPATIENT
Start: 2022-11-02

## 2022-11-07 ENCOUNTER — TELEPHONE (OUTPATIENT)
Dept: UROLOGY | Facility: CLINIC | Age: 62
End: 2022-11-07

## 2022-11-07 NOTE — TELEPHONE ENCOUNTER
Please schedule the patient for a follow-up visit 3 months with advanced practitioner in [place] with a PSA prior  Follow-up as currently scheduled is too long since he has elected for observation    Thank you

## 2022-12-01 LAB
ALBUMIN SERPL-MCNC: 4.5 G/DL (ref 3.8–4.8)
ALBUMIN/GLOB SERPL: 1.9 {RATIO} (ref 1.2–2.2)
ALP SERPL-CCNC: 61 IU/L (ref 44–121)
ALT SERPL-CCNC: 17 IU/L (ref 0–44)
AST SERPL-CCNC: 15 IU/L (ref 0–40)
BASOPHILS # BLD AUTO: 0 X10E3/UL (ref 0–0.2)
BASOPHILS NFR BLD AUTO: 1 %
BILIRUB SERPL-MCNC: 0.3 MG/DL (ref 0–1.2)
BUN SERPL-MCNC: 15 MG/DL (ref 8–27)
BUN/CREAT SERPL: 14 (ref 10–24)
CALCIUM SERPL-MCNC: 10.3 MG/DL (ref 8.6–10.2)
CHLORIDE SERPL-SCNC: 104 MMOL/L (ref 96–106)
CHOLEST SERPL-MCNC: 114 MG/DL (ref 100–199)
CO2 SERPL-SCNC: 27 MMOL/L (ref 20–29)
CREAT SERPL-MCNC: 1.11 MG/DL (ref 0.76–1.27)
EGFR: 75 ML/MIN/1.73
EOSINOPHIL # BLD AUTO: 0.3 X10E3/UL (ref 0–0.4)
EOSINOPHIL NFR BLD AUTO: 4 %
ERYTHROCYTE [DISTWIDTH] IN BLOOD BY AUTOMATED COUNT: 12.5 % (ref 11.6–15.4)
GLOBULIN SER-MCNC: 2.4 G/DL (ref 1.5–4.5)
GLUCOSE SERPL-MCNC: 114 MG/DL (ref 70–99)
HCT VFR BLD AUTO: 43.1 % (ref 37.5–51)
HDLC SERPL-MCNC: 32 MG/DL
HGB BLD-MCNC: 14.3 G/DL (ref 13–17.7)
IMM GRANULOCYTES # BLD: 0 X10E3/UL (ref 0–0.1)
IMM GRANULOCYTES NFR BLD: 0 %
LDLC SERPL CALC-MCNC: 62 MG/DL (ref 0–99)
LDLC/HDLC SERPL: 1.9 RATIO (ref 0–3.6)
LYMPHOCYTES # BLD AUTO: 2.3 X10E3/UL (ref 0.7–3.1)
LYMPHOCYTES NFR BLD AUTO: 33 %
MCH RBC QN AUTO: 30.2 PG (ref 26.6–33)
MCHC RBC AUTO-ENTMCNC: 33.2 G/DL (ref 31.5–35.7)
MCV RBC AUTO: 91 FL (ref 79–97)
MONOCYTES # BLD AUTO: 0.5 X10E3/UL (ref 0.1–0.9)
MONOCYTES NFR BLD AUTO: 8 %
NEUTROPHILS # BLD AUTO: 3.8 X10E3/UL (ref 1.4–7)
NEUTROPHILS NFR BLD AUTO: 54 %
PLATELET # BLD AUTO: 268 X10E3/UL (ref 150–450)
POTASSIUM SERPL-SCNC: 5 MMOL/L (ref 3.5–5.2)
PROT SERPL-MCNC: 6.9 G/DL (ref 6–8.5)
PSA SERPL-MCNC: 3.1 NG/ML (ref 0–4)
RBC # BLD AUTO: 4.73 X10E6/UL (ref 4.14–5.8)
SL AMB VLDL CHOLESTEROL CALC: 20 MG/DL (ref 5–40)
SODIUM SERPL-SCNC: 144 MMOL/L (ref 134–144)
TRIGL SERPL-MCNC: 104 MG/DL (ref 0–149)
TSH SERPL DL<=0.005 MIU/L-ACNC: 1.45 UIU/ML (ref 0.45–4.5)
WBC # BLD AUTO: 6.9 X10E3/UL (ref 3.4–10.8)

## 2022-12-02 ENCOUNTER — TELEPHONE (OUTPATIENT)
Dept: INTERNAL MEDICINE CLINIC | Facility: CLINIC | Age: 62
End: 2022-12-02

## 2022-12-02 DIAGNOSIS — E83.52 HYPERCALCEMIA: Primary | ICD-10-CM

## 2022-12-02 NOTE — TELEPHONE ENCOUNTER
----- Message from Margaux Esquivel MD sent at 12/2/2022  8:48 AM EST -----  Please call the patient regarding his abnormal result  Serum calcium level in recent lab work is slightly elevated  This requires some further testing to try to figure out why  I have placed those orders  He should come and get those tests done

## 2022-12-04 DIAGNOSIS — E11.69 TYPE 2 DIABETES MELLITUS WITH OTHER SPECIFIED COMPLICATION, WITHOUT LONG-TERM CURRENT USE OF INSULIN (HCC): ICD-10-CM

## 2022-12-05 RX ORDER — OMEGA-3-ACID ETHYL ESTERS 1 G/1
1 CAPSULE, LIQUID FILLED ORAL 2 TIMES DAILY
Qty: 180 CAPSULE | Refills: 0 | Status: SHIPPED | OUTPATIENT
Start: 2022-12-05

## 2022-12-09 ENCOUNTER — HOSPITAL ENCOUNTER (OUTPATIENT)
Dept: CT IMAGING | Facility: HOSPITAL | Age: 62
Discharge: HOME | End: 2022-12-09

## 2022-12-09 DIAGNOSIS — R22.2 SUBCUTANEOUS MASS OF SUPRACLAVICULAR AREA: ICD-10-CM

## 2022-12-09 RX ADMIN — IOHEXOL 85 ML: 350 INJECTION, SOLUTION INTRAVENOUS at 12:27

## 2022-12-13 LAB
1,25(OH)2D3 SERPL-MCNC: 48.7 PG/ML (ref 24.8–81.5)
ALBUMIN SERPL ELPH-MCNC: 3.8 G/DL (ref 2.9–4.4)
ALBUMIN/GLOB SERPL: 1.3 {RATIO} (ref 0.7–1.7)
ALPHA1 GLOB SERPL ELPH-MCNC: 0.1 G/DL (ref 0–0.4)
ALPHA2 GLOB SERPL ELPH-MCNC: 0.9 G/DL (ref 0.4–1)
B-GLOBULIN SERPL ELPH-MCNC: 1 G/DL (ref 0.7–1.3)
CA-I SERPL ISE-MCNC: 5 MG/DL (ref 4.5–5.6)
GAMMA GLOB SERPL ELPH-MCNC: 1 G/DL (ref 0.4–1.8)
GLOBULIN SER CALC-MCNC: 3 G/DL (ref 2.2–3.9)
IGA SERPL-MCNC: 227 MG/DL (ref 61–437)
IGG SERPL-MCNC: 970 MG/DL (ref 603–1613)
IGM SERPL-MCNC: 23 MG/DL (ref 20–172)
LABORATORY COMMENT REPORT: NORMAL
M PROTEIN SERPL ELPH-MCNC: NORMAL G/DL
PROT SERPL-MCNC: 6.8 G/DL (ref 6–8.5)
PTH RELATED PROT SERPL-SCNC: <2 PMOL/L
PTH-INTACT SERPL-MCNC: 32 PG/ML (ref 15–65)

## 2022-12-14 ENCOUNTER — OFFICE VISIT (OUTPATIENT)
Dept: INTERNAL MEDICINE CLINIC | Facility: CLINIC | Age: 62
End: 2022-12-14

## 2022-12-14 ENCOUNTER — TELEPHONE (OUTPATIENT)
Dept: INTERNAL MEDICINE CLINIC | Facility: CLINIC | Age: 62
End: 2022-12-14

## 2022-12-14 VITALS
SYSTOLIC BLOOD PRESSURE: 122 MMHG | HEIGHT: 69 IN | HEART RATE: 65 BPM | DIASTOLIC BLOOD PRESSURE: 72 MMHG | OXYGEN SATURATION: 99 % | BODY MASS INDEX: 31.61 KG/M2 | TEMPERATURE: 98.9 F | WEIGHT: 213.4 LBS

## 2022-12-14 DIAGNOSIS — Z12.11 COLON CANCER SCREENING: ICD-10-CM

## 2022-12-14 DIAGNOSIS — E11.69 TYPE 2 DIABETES MELLITUS WITH OTHER SPECIFIED COMPLICATION, WITHOUT LONG-TERM CURRENT USE OF INSULIN (HCC): ICD-10-CM

## 2022-12-14 DIAGNOSIS — I10 ESSENTIAL HYPERTENSION: ICD-10-CM

## 2022-12-14 LAB — SL AMB POCT HEMOGLOBIN AIC: 7 (ref ?–6.5)

## 2022-12-14 RX ORDER — ACETAMINOPHEN AND CODEINE PHOSPHATE 300; 30 MG/1; MG/1
1 TABLET ORAL EVERY 4 HOURS PRN
COMMUNITY
Start: 2022-11-11

## 2022-12-14 RX ORDER — IBUPROFEN 600 MG/1
600 TABLET ORAL EVERY 6 HOURS PRN
COMMUNITY
Start: 2022-11-11 | End: 2022-12-14

## 2022-12-14 NOTE — PATIENT INSTRUCTIONS
Hemoglobin A1c of 7 0 significantly improved From prior 8 6  He has been on Victoza    If he likes, he can check out whether or not the following once a week drugs are covered  Ozempic  Trulicity  Bydureon

## 2022-12-14 NOTE — PROGRESS NOTES
Diabetic Foot Exam    Patient's shoes and socks removed  Right Foot/Ankle   Right Foot Inspection  Skin Exam: skin intact  Sensory   Monofilament testing: intact    Vascular  The right DP pulse is 1+  The right PT pulse is 1+  Left Foot/Ankle  Left Foot Inspection  Skin Exam: skin intact and erythema  Sensory   Monofilament testing: intact    Vascular  The left DP pulse is 1+  The left PT pulse is 1+

## 2022-12-14 NOTE — TELEPHONE ENCOUNTER
----- Message from Rolan Nickerson MD sent at 12/14/2022 11:49 AM EST -----  Please call the patient regarding his  result  CT scan of the neck only shows more fat in those areas    No pathology

## 2022-12-14 NOTE — TELEPHONE ENCOUNTER
I spoke to pt he is aware of his results and providers message and would like to know is there someone else that he would need to see for the fat on his neck just need some advise on what to do next

## 2022-12-14 NOTE — PROGRESS NOTES
Assessment/Plan:       Diagnoses and all orders for this visit:    Type 2 diabetes mellitus with other specified complication, without long-term current use of insulin (HCC)  -     Microalbumin / creatinine urine ratio ([facility]); Future  -     POCT hemoglobin A1c  -     Microalbumin / creatinine urine ratio ([facility])    Colon cancer screening  -     Ambulatory referral for colonoscopy; Future    Essential hypertension    Other orders  -     Discontinue: ibuprofen (MOTRIN) 600 mg tablet; Take 600 mg by mouth every 6 (six) hours as needed (Patient not taking: Reported on 12/14/2022)  -     acetaminophen-codeine (TYLENOL #3) 300-30 mg per tablet; Take 1 tablet by mouth every 4 (four) hours as needed (Patient not taking: Reported on 12/14/2022)                Subjective:      Patient ID: Adriana Mortensen is a 57 y o  male  Hypertensive diabetic and dyslipidemic  Several times in the past year A1c was greater than 8  Attempted to give [place] but failed due to urinary issues  Added Victoza and worked him up to 1 8 mg subcutaneously once a day  On this dose, continuing metformin even though he gets some diarrhea side effect, A1c down to 7 0  Blood pressure is controlled  Lipids are controlled     Family history of prostate cancer  Father had prostate cancer  Now his identical twin brother has prostate cancer diagnosed after PSA of 4 03  His PSA was 3 6 but I sent him to Urology   Colonoscopy done 2019    He has some exacerbation of the supraclavicular fat pads  CT of neck was done but is not read yet  Elevated calcium was noted  Hypercalcemic work-up is unremarkable and the ionized calcium is normal so we shall see        The following portions of the patient's history were reviewed and updated as appropriate:   He has a past medical history of Asthma, Benign prostatic hyperplasia, Chronic kidney disease, Diabetes mellitus ([address] ), Family history of prostate cancer, Kidney stone, Mixed hyperlipidemia, and Prostate cancer ([facility] [address] )  ,  does not have any pertinent problems on file  ,   has a past surgical history that includes Shoulder surgery (2015); Cholecystectomy; Rotator cuff repair; and Prostate biopsy  ,  family history includes Cancer in his father; Diabetes in his mother; Hypertension in his brother, father, and mother; Prostate cancer in his father  ,   reports that he has never smoked  He has never used smokeless tobacco  He reports current alcohol use of about 1 0 standard drink per week  He reports current drug use  Drug: Marijuana  ,  has No Known Allergies     Current Outpatient Medications   Medication Sig Dispense Refill   • amLODIPine (NORVASC) 5 mg tablet Take 1 tablet (5 mg total) by mouth daily 90 tablet 3   • aspirin (ECOTRIN LOW STRENGTH) 81 mg EC tablet Take 81 mg by mouth daily     • atorvastatin (LIPITOR) 20 mg tablet Take 1 tablet (20 mg total) by mouth daily 90 tablet 3   • Easy Touch FlipLock Needles 25G X 1" MISC USE IN THE MORNING 100 each 0   • glucose blood test strip Test once daily or as directed 100 each 2   • Lancets (onetouch ultrasoft) lancets Test once daily or as directed 100 each 3   • liraglutide (VICTOZA) injection Inject 0 3 mL (1 8 mg total) under the skin daily 12 mL 3   • metFORMIN (GLUCOPHAGE-XR) 500 mg 24 hr tablet TAKE 4 TABLETS BY MOUTH ONCE DAILY (Patient taking differently: 2 (two) times a day with meals Patient taking 4 tablets daily 11/01/22) 360 tablet 0   • metoprolol succinate (TOPROL-XL) 25 mg 24 hr tablet TAKE 1 & 1/2 (ONE & ONE-HALF) TABLETS BY MOUTH AT BEDTIME 45 tablet 6   • montelukast (SINGULAIR) 10 mg tablet Take 1 tablet (10 mg total) by mouth daily at bedtime 30 tablet 6   • omega-3-acid ethyl esters (LOVAZA) 1 g capsule Take 1 capsule (1 g total) by mouth 2 (two) times a day 180 capsule 0   • sildenafil (REVATIO) 20 mg tablet TAKE 2 TABLETS BY MOUTH ONCE DAILY 1  HOUR  BEFORE  INTERCOURSE  30 tablet 0   • acetaminophen-codeine (TYLENOL #3) 300-30 mg per tablet Take 1 tablet by mouth every 4 (four) hours as needed (Patient not taking: Reported on 12/14/2022)     • bisacodyl (FLEET) 10 MG/30ML ENEM Insert 30 mL (10 mg total) into the rectum once for 1 dose   Use morning of prostate biopsy 30 mL 0   • lisinopril (ZESTRIL) 40 mg tablet Take 1 tablet (40 mg total) by mouth daily 30 tablet 6     No current facility-administered medications for this visit  Review of Systems   Endocrine: Negative for polydipsia, polyphagia and polyuria  Objective:  Vitals:    12/14/22 0902   BP: 122/72   Pulse: 65   Temp: 98 9 °F (37 2 °C)   SpO2: 99%      Physical Exam  Constitutional:       Comments: Overweight male patient who appears to be the stated age  He looks well  Cardiovascular:      Rate and Rhythm: Normal rate and regular rhythm  Pulmonary:      Effort: Pulmonary effort is normal       Breath sounds: Normal breath sounds  Neurological:      General: No focal deficit present  Mental Status: He is alert  Psychiatric:         Mood and Affect: Mood normal          Judgment: Judgment normal            Patient Instructions   Hemoglobin A1c of 7 0 significantly improved From prior 8 6  He has been on Victoza    If he likes, he can check out whether or not the following once a week drugs are covered  Ozempic  Trulicity  Bydureon

## 2022-12-20 ENCOUNTER — TELEPHONE (OUTPATIENT)
Dept: INTERNAL MEDICINE CLINIC | Facility: CLINIC | Age: 62
End: 2022-12-20

## 2022-12-20 DIAGNOSIS — E11.69 TYPE 2 DIABETES MELLITUS WITH OTHER SPECIFIED COMPLICATION, WITHOUT LONG-TERM CURRENT USE OF INSULIN (HCC): Primary | ICD-10-CM

## 2022-12-20 RX ORDER — DULAGLUTIDE 0.75 MG/.5ML
0.75 INJECTION, SOLUTION SUBCUTANEOUS WEEKLY
Qty: 6 ML | Refills: 3 | Status: SHIPPED | OUTPATIENT
Start: 2022-12-20 | End: 2022-12-28 | Stop reason: SDUPTHER

## 2022-12-20 NOTE — TELEPHONE ENCOUNTER
Patient's Pharmacy Benefits will be contacting office regarding approval for patient to use Trulicity or Ozempic instead of Victoza  Contact should be made by Madhavi Montesinos from [employer] [employer] [facility] [place] - [phone]  Both of the above medications are approved by patient's benefits

## 2022-12-20 NOTE — TELEPHONE ENCOUNTER
Recent office note from Dr Oly Mosley was Faxed today to Madhavi PAZ#: [phone]    To cover his scripts with his union benefits

## 2022-12-22 ENCOUNTER — TELEPHONE (OUTPATIENT)
Dept: INTERNAL MEDICINE CLINIC | Facility: CLINIC | Age: 62
End: 2022-12-22

## 2022-12-22 DIAGNOSIS — I10 ESSENTIAL HYPERTENSION: ICD-10-CM

## 2022-12-22 DIAGNOSIS — E11.69 TYPE 2 DIABETES MELLITUS WITH OTHER SPECIFIED COMPLICATION, WITHOUT LONG-TERM CURRENT USE OF INSULIN (HCC): ICD-10-CM

## 2022-12-22 DIAGNOSIS — J45.20 MILD INTERMITTENT ASTHMA WITHOUT COMPLICATION: ICD-10-CM

## 2022-12-22 LAB
ALBUMIN/CREAT UR: 6 MG/G CREAT (ref 0–29)
CREAT UR-MCNC: 181.7 MG/DL
MICROALBUMIN UR-MCNC: 10.6 UG/ML

## 2022-12-22 NOTE — TELEPHONE ENCOUNTER
Clarke Bustamante from [employer]  called 2 days ago to get the latest progress notes and the prescription sent to  pharmacy  I just received the latest progress note  I'm still waiting on the prescriptions      Fax # [phone]     [phone]

## 2022-12-23 RX ORDER — DULAGLUTIDE 0.75 MG/.5ML
0.75 INJECTION, SOLUTION SUBCUTANEOUS WEEKLY
Qty: 6 ML | Refills: 3 | Status: CANCELLED | OUTPATIENT
Start: 2022-12-23

## 2022-12-23 RX ORDER — METFORMIN HYDROCHLORIDE 500 MG/1
TABLET, EXTENDED RELEASE ORAL
Qty: 360 TABLET | Refills: 0 | Status: CANCELLED | OUTPATIENT
Start: 2022-12-23

## 2022-12-23 RX ORDER — LISINOPRIL 40 MG/1
40 TABLET ORAL DAILY
Qty: 30 TABLET | Refills: 6 | Status: CANCELLED | OUTPATIENT
Start: 2022-12-23 | End: 2023-01-22

## 2022-12-23 RX ORDER — AMLODIPINE BESYLATE 5 MG/1
5 TABLET ORAL DAILY
Qty: 90 TABLET | Refills: 3 | Status: CANCELLED | OUTPATIENT
Start: 2022-12-23

## 2022-12-23 RX ORDER — METOPROLOL SUCCINATE 25 MG/1
TABLET, EXTENDED RELEASE ORAL
Qty: 45 TABLET | Refills: 6 | Status: CANCELLED | OUTPATIENT
Start: 2022-12-23

## 2022-12-23 RX ORDER — MONTELUKAST SODIUM 10 MG/1
10 TABLET ORAL
Qty: 30 TABLET | Refills: 6 | Status: CANCELLED | OUTPATIENT
Start: 2022-12-23 | End: 2023-01-22

## 2022-12-23 NOTE — TELEPHONE ENCOUNTER
Sabi, please have Sofi sign off on these  Pt needs before the weekend     Call [phone] [facility]

## 2022-12-27 ENCOUNTER — TELEPHONE (OUTPATIENT)
Dept: INTERNAL MEDICINE CLINIC | Facility: CLINIC | Age: 62
End: 2022-12-27

## 2022-12-27 DIAGNOSIS — E78.2 MIXED HYPERLIPIDEMIA: ICD-10-CM

## 2022-12-27 DIAGNOSIS — E11.69 TYPE 2 DIABETES MELLITUS WITH OTHER SPECIFIED COMPLICATION, WITHOUT LONG-TERM CURRENT USE OF INSULIN (HCC): ICD-10-CM

## 2022-12-27 DIAGNOSIS — I10 ESSENTIAL HYPERTENSION: ICD-10-CM

## 2022-12-27 DIAGNOSIS — J45.20 MILD INTERMITTENT ASTHMA WITHOUT COMPLICATION: ICD-10-CM

## 2022-12-27 NOTE — TELEPHONE ENCOUNTER
MARCO  FROM  [employer]   CALLED   BACK   IT  LOOKS  LIKE  MARIAJOSE  HAS  BEEN  WORKING  ON  THIS   THEY   WANT   UPDATE  ON  THE  RXS

## 2022-12-28 RX ORDER — LISINOPRIL 40 MG/1
40 TABLET ORAL DAILY
Qty: 90 TABLET | Refills: 3 | Status: SHIPPED | OUTPATIENT
Start: 2022-12-28 | End: 2022-12-30 | Stop reason: SDUPTHER

## 2022-12-28 RX ORDER — METOPROLOL SUCCINATE 25 MG/1
TABLET, EXTENDED RELEASE ORAL
Qty: 135 TABLET | Refills: 3 | Status: SHIPPED | OUTPATIENT
Start: 2022-12-28

## 2022-12-28 RX ORDER — DULAGLUTIDE 0.75 MG/.5ML
0.75 INJECTION, SOLUTION SUBCUTANEOUS WEEKLY
Qty: 6 ML | Refills: 3 | Status: SHIPPED | OUTPATIENT
Start: 2022-12-28

## 2022-12-28 RX ORDER — MONTELUKAST SODIUM 10 MG/1
10 TABLET ORAL
Qty: 90 TABLET | Refills: 3 | Status: SHIPPED | OUTPATIENT
Start: 2022-12-28 | End: 2022-12-30 | Stop reason: SDUPTHER

## 2022-12-28 RX ORDER — ATORVASTATIN CALCIUM 20 MG/1
20 TABLET, FILM COATED ORAL DAILY
Qty: 90 TABLET | Refills: 3 | Status: SHIPPED | OUTPATIENT
Start: 2022-12-28

## 2022-12-28 RX ORDER — AMLODIPINE BESYLATE 5 MG/1
5 TABLET ORAL DAILY
Qty: 90 TABLET | Refills: 3 | Status: SHIPPED | OUTPATIENT
Start: 2022-12-28

## 2022-12-30 DIAGNOSIS — J45.20 MILD INTERMITTENT ASTHMA WITHOUT COMPLICATION: ICD-10-CM

## 2022-12-30 DIAGNOSIS — I10 ESSENTIAL HYPERTENSION: ICD-10-CM

## 2022-12-30 NOTE — TELEPHONE ENCOUNTER
Doctor Scarlet Long is requesting 1 month supply of his lisinopril 40 mg and montelukast 10 mg sent to [facility] until he received his 80 day supply mail out service

## 2023-01-03 RX ORDER — MONTELUKAST SODIUM 10 MG/1
10 TABLET ORAL
Qty: 30 TABLET | Refills: 0 | Status: SHIPPED | OUTPATIENT
Start: 2023-01-03 | End: 2023-02-02

## 2023-01-03 RX ORDER — LISINOPRIL 40 MG/1
40 TABLET ORAL DAILY
Qty: 30 TABLET | Refills: 0 | Status: SHIPPED | OUTPATIENT
Start: 2023-01-03 | End: 2023-02-02

## 2023-02-20 NOTE — PROGRESS NOTES
2/21/2023      Chief Complaint   Patient presents with   • Prostate Cancer     Assessment and Plan    1  Low risk prostate cancer  - cT1c  -grade group 1 (Yeni 3+3=6 disease, 4 of 12 total positive cores-left medial base, left medial base, right lateral apex, right medial apex)  Maximum core positivity: 10%  - pre treatment PSA:  5 0  - He opted for active surveillance  He did meet with radiation oncology and is not interested in radiation therapy at this time  He is potentially interested in proceeding with prostatectomy  - Most recent PSA from 11/30/22 was 3 1, obtain repeat PSA 6 months from prior in May 2023  - ELENA benign  -He will meet with Dr Jimenez again to discuss RALP  He is still waiting all his treatment options  We will plan for him to complete a prostate MRI prior to this visit and should he opt for ongoing active surveillance we will proceed with confirmatory biopsy 1 year from prior with MRI targeted fusion biopsy versus transrectal prostate biopsy pending the results  History of Present Illness  Katharina Johnson is a 60 y o  male here for follow up evaluation of prostate cancer  Patient underwent prostate biopsy in September 2022 for PSA of 5 0  He was seen to have Philipp 6 prostate cancer in 4 out of 12 cores  Most recent PSA from November 2022 was 3 1  He did meet with radiation oncology and ultimately opted for active surveillance  Review of Systems   Constitutional: Negative for chills and fever  Respiratory: Negative for shortness of breath  Cardiovascular: Negative for chest pain  Gastrointestinal: Negative for abdominal pain  Genitourinary: Negative for difficulty urinating, dysuria, flank pain, frequency, hematuria and urgency  Neurological: Negative for dizziness  AUA SYMPTOM SCORE    Flowsheet Row Most Recent Value   AUA SYMPTOM SCORE    How often have you had a sensation of not emptying your bladder completely after you finished urinating? 1 (P)     How often have you had to urinate again less than two hours after you finished urinating? 1 (P)     How often have you found you stopped and started again several times when you urinate? 1 (P)     How often have you found it difficult to postpone urination? 0 (P)     How often have you had a weak urinary stream? 2 (P)     How often have you had to push or strain to begin urination? 0 (P)     How many times did you most typically get up to urinate from the time you went to bed at night until the time you got up in the morning? 0 (P)     Quality of Life: If you were to spend the rest of your life with your urinary condition just the way it is now, how would you feel about that? 1 (P)     AUA SYMPTOM SCORE 5 (P)              Past Medical History  Past Medical History:   Diagnosis Date   • Asthma    • Benign prostatic hyperplasia    • Chronic kidney disease    • Diabetes mellitus ([facility] [address] )    • Erectile dysfunction    • Family history of prostate cancer    • Hypertension    • Kidney stone    • Mixed hyperlipidemia    • Prostate cancer [facility])        Past Social History  Past Surgical History:   Procedure Laterality Date   • CHOLECYSTECTOMY     • PROSTATE BIOPSY     • ROTATOR CUFF REPAIR     • SHOULDER SURGERY  2015   • VASECTOMY  1994     Social History     Tobacco Use   Smoking Status Never   • Passive exposure: Past   Smokeless Tobacco Never   Tobacco Comments    I dont smoke       Past Family History  Family History   Problem Relation Age of Onset   • Hypertension Mother    • Diabetes Mother    • Cancer Father    • Hypertension Father    • Prostate cancer Father    • Hypertension Brother        Past Social history  Social History     Socioeconomic History   • Marital status: /Civil Union     Spouse name: Not on file   • Number of children: Not on file   • Years of education: Not on file   • Highest education level: Not on file   Occupational History   • Not on file   Tobacco Use   • Smoking status: Never Passive exposure: Past   • Smokeless tobacco: Never   • Tobacco comments:     I dont smoke   Vaping Use   • Vaping Use: Never used   Substance and Sexual Activity   • Alcohol use: Yes     Alcohol/week: 1 0 standard drink     Types: 1 Standard drinks or equivalent per week     Comment: sOCIAL   • Drug use: Yes     Frequency: 1 0 times per week     Types: Marijuana     Comment: Vape pen   • Sexual activity: Yes     Partners: Female     Birth control/protection: None   Other Topics Concern   • Not on file   Social History Narrative   • Not on file     Social Determinants of Health     Financial Resource Strain: Not on file   Food Insecurity: Not on file   Transportation Needs: Not on file   Physical Activity: Not on file   Stress: No Stress Concern Present   • Feeling of Stress :  Only a little   Social Connections: Not on file   Intimate Partner Violence: Not on file   Housing Stability: Not on file       Current Medications  Current Outpatient Medications   Medication Sig Dispense Refill   • amLODIPine (NORVASC) 5 mg tablet Take 1 tablet (5 mg total) by mouth daily 90 tablet 3   • aspirin (ECOTRIN LOW STRENGTH) 81 mg EC tablet Take 81 mg by mouth daily     • atorvastatin (LIPITOR) 20 mg tablet Take 1 tablet (20 mg total) by mouth daily 90 tablet 3   • Dulaglutide (Trulicity) 5 86 RS/7 2FD SOPN Inject 0 5 mL (0 75 mg total) under the skin once a week 6 mL 3   • Easy Touch FlipLock Needles 25G X 1" MISC USE IN THE MORNING 100 each 0   • glucose blood test strip Test once daily or as directed 100 each 2   • Lancets (onetouch ultrasoft) lancets Test once daily or as directed 100 each 3   • lisinopril (ZESTRIL) 40 mg tablet Take 1 tablet (40 mg total) by mouth daily 30 tablet 0   • metFORMIN (GLUCOPHAGE-XR) 500 mg 24 hr tablet TAKE 4 TABLETS BY MOUTH ONCE DAILY (Patient taking differently: 1,000 mg 2 (two) times a day with meals) 360 tablet 0   • metoprolol succinate (TOPROL-XL) 25 mg 24 hr tablet TAKE 1 & 1/2 (ONE & ONE-HALF) TABLETS BY MOUTH AT BEDTIME 135 tablet 3   • montelukast (SINGULAIR) 10 mg tablet Take 1 tablet (10 mg total) by mouth daily at bedtime 30 tablet 0   • omega-3-acid ethyl esters (LOVAZA) 1 g capsule Take 1 capsule (1 g total) by mouth 2 (two) times a day 180 capsule 0   • sildenafil (REVATIO) 20 mg tablet TAKE 2 TABLETS BY MOUTH ONCE DAILY 1  HOUR  BEFORE  INTERCOURSE  30 tablet 0   • acetaminophen-codeine (TYLENOL #3) 300-30 mg per tablet Take 1 tablet by mouth every 4 (four) hours as needed (Patient not taking: Reported on 12/14/2022)       No current facility-administered medications for this visit  Allergies  No Known Allergies      The following portions of the patient's history were reviewed and updated as appropriate: allergies, current medications, past medical history, past social history, past surgical history and problem list       Vitals  Vitals:    02/21/23 1324   BP: 126/74   Pulse: 66   SpO2: 97%   Weight: 97 1 kg (214 lb)   Height: 5' 9" (1 753 m)           Physical Exam  Physical Exam  Constitutional:       Appearance: Normal appearance  HENT:      Head: Normocephalic and atraumatic  Right Ear: External ear normal       Left Ear: External ear normal       Nose: Nose normal    Eyes:      General: No scleral icterus  Cardiovascular:      Pulses: Normal pulses  Pulmonary:      Effort: Pulmonary effort is normal    Genitourinary:     Comments: No prostatic nodules or tenderness  Musculoskeletal:         General: Normal range of motion  Cervical back: Normal range of motion  Skin:     General: Skin is warm and dry  Neurological:      General: No focal deficit present  Mental Status: He is alert and oriented to person, place, and time  Psychiatric:         Mood and Affect: Mood normal          Behavior: Behavior normal          Thought Content:  Thought content normal          Judgment: Judgment normal            Results  No results found for this or any previous visit (from the past 1 hour(s)) ]  Lab Results   Component Value Date    PSA 3 1 11/30/2022    PSA 5 0 (H) 06/17/2022    PSA 3 6 11/26/2021     Lab Results   Component Value Date    CALCIUM 8 8 03/10/2019    K 5 0 11/30/2022    CO2 27 11/30/2022     11/30/2022    BUN 15 11/30/2022    CREATININE 1 11 11/30/2022     Lab Results   Component Value Date    WBC 6 9 11/30/2022    HGB 14 3 11/30/2022    HCT 43 1 11/30/2022    MCV 91 11/30/2022     11/30/2022           Orders  No orders of the defined types were placed in this encounter        Luis Miguel Van

## 2023-02-21 ENCOUNTER — OFFICE VISIT (OUTPATIENT)
Dept: UROLOGY | Facility: CLINIC | Age: 63
End: 2023-02-21

## 2023-02-21 VITALS
SYSTOLIC BLOOD PRESSURE: 126 MMHG | BODY MASS INDEX: 31.7 KG/M2 | OXYGEN SATURATION: 97 % | DIASTOLIC BLOOD PRESSURE: 74 MMHG | HEIGHT: 69 IN | WEIGHT: 214 LBS | HEART RATE: 66 BPM

## 2023-02-21 DIAGNOSIS — C61 PROSTATE CANCER (HCC): Primary | ICD-10-CM

## 2023-03-01 DIAGNOSIS — E11.69 TYPE 2 DIABETES MELLITUS WITH OTHER SPECIFIED COMPLICATION, WITHOUT LONG-TERM CURRENT USE OF INSULIN (HCC): ICD-10-CM

## 2023-03-01 DIAGNOSIS — I10 ESSENTIAL HYPERTENSION: ICD-10-CM

## 2023-03-01 RX ORDER — OMEGA-3-ACID ETHYL ESTERS 1 G/1
1 CAPSULE, LIQUID FILLED ORAL 2 TIMES DAILY
Qty: 180 CAPSULE | Refills: 0 | Status: SHIPPED | OUTPATIENT
Start: 2023-03-01

## 2023-03-01 RX ORDER — SILDENAFIL CITRATE 20 MG/1
TABLET ORAL
Qty: 30 TABLET | Refills: 0 | Status: SHIPPED | OUTPATIENT
Start: 2023-03-01

## 2023-03-04 LAB
BUN SERPL-MCNC: 15 MG/DL (ref 8–27)
BUN/CREAT SERPL: 13 (ref 10–24)
CALCIUM SERPL-MCNC: 9.4 MG/DL (ref 8.6–10.2)
CHLORIDE SERPL-SCNC: 102 MMOL/L (ref 96–106)
CO2 SERPL-SCNC: 26 MMOL/L (ref 20–29)
CREAT SERPL-MCNC: 1.15 MG/DL (ref 0.76–1.27)
EGFR: 72 ML/MIN/1.73
GLUCOSE SERPL-MCNC: 105 MG/DL (ref 70–99)
POTASSIUM SERPL-SCNC: 5 MMOL/L (ref 3.5–5.2)
SODIUM SERPL-SCNC: 142 MMOL/L (ref 134–144)

## 2023-03-15 ENCOUNTER — HOSPITAL ENCOUNTER (OUTPATIENT)
Dept: RADIOLOGY | Age: 63
Discharge: HOME | End: 2023-03-15

## 2023-03-15 DIAGNOSIS — C61 PROSTATE CANCER (HCC): ICD-10-CM

## 2023-03-15 RX ADMIN — GADOBUTROL 10 ML: 604.72 INJECTION INTRAVENOUS at 14:34

## 2023-03-29 ENCOUNTER — OFFICE VISIT (OUTPATIENT)
Dept: UROLOGY | Facility: CLINIC | Age: 63
End: 2023-03-29

## 2023-03-29 VITALS
WEIGHT: 218 LBS | OXYGEN SATURATION: 96 % | BODY MASS INDEX: 32.29 KG/M2 | SYSTOLIC BLOOD PRESSURE: 126 MMHG | HEART RATE: 78 BPM | HEIGHT: 69 IN | DIASTOLIC BLOOD PRESSURE: 82 MMHG

## 2023-03-29 DIAGNOSIS — E11.69 TYPE 2 DIABETES MELLITUS WITH OTHER SPECIFIED COMPLICATION, WITHOUT LONG-TERM CURRENT USE OF INSULIN (HCC): ICD-10-CM

## 2023-03-29 DIAGNOSIS — C61 PROSTATE CANCER (HCC): Primary | ICD-10-CM

## 2023-03-29 NOTE — PROGRESS NOTES
Referring Physician: Calista Spencer MD  A copy of this note was sent to the referring physician  Diagnoses and all orders for this visit:    Prostate cancer ([facility] [address] )  -     PSA Total, Diagnostic; Future  -     PSA Total, Diagnostic    Type 2 diabetes mellitus with other specified complication, without long-term current use of insulin (HCC)            Assessment and plan:       1  Newly diagnosed low risk prostate cancer  -cT1c  -grade group 1 (Yeni 3+3=6 disease, 4 of 12 total positive cores-left medial base, left medial base, right lateral apex, right medial apex)  Maximum core positivity: 10%  - pre treatment PSA:  5 0  -Repeat PSA: 3 1  - Multiparametric MRI: 1 2 cm lesion in the left base corresponding to site of prior biopsy, no extracapsular extension seminal vesicle invasion    Patient is doing very well with active surveillance  His PSA is actually decreased  His MRI does show a radiographically detectable tumor which is just over 1 cm in size  I do not see any absolute indications to transition from active surveillance to active management at this point time  We did discuss a robotic prostatectomy in detail today with the patient and his wife  Now they have elected to continue with active surveillance  We will schedule his next biopsy in the form of an MR guided transperineal approach  Risk benefits pros and cons were reviewed  He has elected to schedule this in the summer with a repeat PSA prior  I will see him afterwards to review the results    Jamia Perry      Chief Complaint      Surveillance follow-up      History of Present Illness     Apollo Gipson is a 60 y o  male returns in follow-up for Hortense 6 prostate cancer on active surveillance  Detailed Urologic History     - please refer to HPI    Review of Systems     Review of Systems   Constitutional: Negative for activity change and fatigue  HENT: Negative for congestion  Eyes: Negative for visual disturbance  "  Respiratory: Negative for shortness of breath and wheezing  Cardiovascular: Negative for chest pain and leg swelling  Gastrointestinal: Negative for abdominal pain  Endocrine: Negative for polyuria  Genitourinary: Negative for dysuria, flank pain, hematuria and urgency  Musculoskeletal: Negative for back pain  Allergic/Immunologic: Negative for immunocompromised state  Neurological: Negative for dizziness and numbness  Psychiatric/Behavioral: Negative for dysphoric mood  All other systems reviewed and are negative  Allergies     No Known Allergies    Physical Exam       Physical Exam  Constitutional:       General: He is not in acute distress  Appearance: He is well-developed  HENT:      Head: Normocephalic and atraumatic  Cardiovascular:      Comments: Negative lower extremity edema  Pulmonary:      Effort: Pulmonary effort is normal       Breath sounds: Normal breath sounds  Abdominal:      Palpations: Abdomen is soft  Musculoskeletal:         General: Normal range of motion  Cervical back: Normal range of motion  Skin:     General: Skin is warm  Neurological:      Mental Status: He is alert and oriented to person, place, and time     Psychiatric:         Behavior: Behavior normal            Vital Signs  Vitals:    03/29/23 1401   BP: 126/82   BP Location: Left arm   Patient Position: Sitting   Cuff Size: Adult   Pulse: 78   SpO2: 96%   Weight: 98 9 kg (218 lb)   Height: 5' 9\" (1 753 m)         Current Medications       Current Outpatient Medications:   •  amLODIPine (NORVASC) 5 mg tablet, Take 1 tablet (5 mg total) by mouth daily, Disp: 90 tablet, Rfl: 3  •  aspirin (ECOTRIN LOW STRENGTH) 81 mg EC tablet, Take 81 mg by mouth daily, Disp: , Rfl:   •  atorvastatin (LIPITOR) 20 mg tablet, Take 1 tablet (20 mg total) by mouth daily, Disp: 90 tablet, Rfl: 3  •  Dulaglutide (Trulicity) 9 13 GY/0 8GF SOPN, Inject 0 5 mL (0 75 mg total) under the skin once a week, Disp: 6 " "mL, Rfl: 3  •  Easy Touch FlipLock Needles 25G X 1\" MISC, USE IN THE MORNING, Disp: 100 each, Rfl: 0  •  glucose blood test strip, Test once daily or as directed, Disp: 100 each, Rfl: 2  •  Lancets (onetouch ultrasoft) lancets, Test once daily or as directed, Disp: 100 each, Rfl: 3  •  lisinopril (ZESTRIL) 40 mg tablet, Take 1 tablet (40 mg total) by mouth daily, Disp: 30 tablet, Rfl: 0  •  metFORMIN (GLUCOPHAGE-XR) 500 mg 24 hr tablet, TAKE 4 TABLETS BY MOUTH ONCE DAILY (Patient taking differently: 1,000 mg 2 (two) times a day with meals), Disp: 360 tablet, Rfl: 0  •  metoprolol succinate (TOPROL-XL) 25 mg 24 hr tablet, TAKE 1 & 1/2 (ONE & ONE-HALF) TABLETS BY MOUTH AT BEDTIME, Disp: 135 tablet, Rfl: 3  •  montelukast (SINGULAIR) 10 mg tablet, Take 1 tablet (10 mg total) by mouth daily at bedtime, Disp: 30 tablet, Rfl: 0  •  omega-3-acid ethyl esters (LOVAZA) 1 g capsule, Take 1 capsule (1 g total) by mouth 2 (two) times a day, Disp: 180 capsule, Rfl: 0  •  sildenafil (REVATIO) 20 mg tablet, TAKE 2 TABLETS BY MOUTH ONCE DAILY 1  HOUR  BEFORE  INTERCOURSE , Disp: 30 tablet, Rfl: 0  •  acetaminophen-codeine (TYLENOL #3) 300-30 mg per tablet, Take 1 tablet by mouth every 4 (four) hours as needed (Patient not taking: Reported on 12/14/2022), Disp: , Rfl:       Active Problems     Patient Active Problem List   Diagnosis   • Type 2 diabetes mellitus with other specified complication, without long-term current use of insulin (HCC)   • Asthma   • Essential hypertension   • Herpes zoster without complication   • Prostate cancer screening   • Pre-operative cardiovascular examination   • Other specified glaucoma   • Hypertensive heart disease without heart failure   • Right elbow pain   • Chest pressure   • Mixed hyperlipidemia   • Family history of early CAD   • Claudication [facility])   • Prostate cancer [facility])         Past Medical History     Past Medical History:   Diagnosis Date   • Asthma    • Benign prostatic hyperplasia  " • Chronic kidney disease    • Diabetes mellitus (HCC)    • Erectile dysfunction    • Family history of prostate cancer    • Hypertension    • Kidney stone    • Mixed hyperlipidemia    • Prostate cancer [facility])          Surgical History     Past Surgical History:   Procedure Laterality Date   • CHOLECYSTECTOMY     • PROSTATE BIOPSY     • ROTATOR CUFF REPAIR     • SHOULDER SURGERY  2015   • VASECTOMY  1994         Family History     Family History   Problem Relation Age of Onset   • Hypertension Mother    • Diabetes Mother    • Cancer Father    • Hypertension Father    • Prostate cancer Father    • Hypertension Brother          Social History     Social History     Social History     Tobacco Use   Smoking Status Never   • Passive exposure: Past   Smokeless Tobacco Never   Tobacco Comments    I dont smoke         Pertinent Lab Values     Lab Results   Component Value Date    CREATININE 1 15 03/03/2023       Lab Results   Component Value Date    PSA 3 1 11/30/2022    PSA 5 0 (H) 06/17/2022    PSA 3 6 11/26/2021       @RESULTRCNT(1H])@      Pertinent Imaging          FINDINGS:     PROSTATE:     Size: 4 1 x 5 1 x 6 4 cm = 69 6 cc  Post-biopsy hemorrhage:  Small amount  Central gland enlargement (BPH): Moderate with median lobe hypertrophy      Focal lesions - localization as follows:     Lesion: 1       Size: 1 2 x 0 9 x 1 3 cm, series 5 image 12, series 3 image 15  Location: Left base posterior transition zone       T2-weighted images: Score 4: Lenticular or noncircumscribed, homogeneous, moderately hypointense, and less than 1 5 cm in greatest dimension  Diffusion-weighted images: Score 4: Focal markedly hypointense on ADC and markedly hyperintense on high b-value DWI; less than 1 5 cm in greatest dimension  Dynamic post-contrast images: (+) Focal, earlier or contemporaneous with, enhancement of adjacent normal prostatic tissues; corresponds to a finding on T2-weighted and/or DWI    PI-RADS "Assessment Category: 4, High (clinically significant cancer is likely to be present)  Extra-prostatic extension (EPE): Does not abut capsule      SEMINAL VESICLES: Unremarkable     Note: Clinically significant cancer is defined on pathology/histology as Yeni score greater than or equal to 7, and/or volume of greater than or equal to 0 5 mL, and/or extraprostatic extension      URINARY BLADDER: Partially distended      LYMPH NODES: No pelvic lymphadenopathy      BONES: No suspicious osseous lesion          IMPRESSION:     1  PI-RADSv2 1 Category 4 -High (clinically significant cancer is likely to be present)  Index lesion #1 in the left base posterior transition zone measuring up to 1 3 cm      2  No extraprostatic tumor, seminal vesicle invasion, pelvic lymphadenopathy, or pelvic osseous metastatic disease      3  Calculated prostate volume of 69 6 cc        Portions of the record may have been created with voice recognition software  Occasional wrong word or \"sound a like\" substitutions may have occurred due to the inherent limitations of voice recognition software  Read the chart carefully and recognize, using context, where substitutions have occurred    "

## 2023-03-29 NOTE — PATIENT INSTRUCTIONS
Christophe Duncan -    I encourage you to begin preparing for the procedure by watching our practice's preoperative educational videos here:     [email or web address]          Chauncey Mack MD,PhD  [facility]  Chief of Surgery, [address] [facility]  [phone]

## 2023-04-19 LAB
LEFT EYE DIABETIC RETINOPATHY: NORMAL
RIGHT EYE DIABETIC RETINOPATHY: NORMAL

## 2023-05-02 ENCOUNTER — OFFICE VISIT (OUTPATIENT)
Age: 63
End: 2023-05-02

## 2023-05-02 VITALS
WEIGHT: 214.6 LBS | DIASTOLIC BLOOD PRESSURE: 82 MMHG | OXYGEN SATURATION: 96 % | RESPIRATION RATE: 18 BRPM | BODY MASS INDEX: 31.78 KG/M2 | HEIGHT: 69 IN | HEART RATE: 62 BPM | TEMPERATURE: 96.8 F | SYSTOLIC BLOOD PRESSURE: 144 MMHG

## 2023-05-02 DIAGNOSIS — G62.9 NEUROPATHY: ICD-10-CM

## 2023-05-02 DIAGNOSIS — M54.12 CERVICAL RADICULOPATHY: ICD-10-CM

## 2023-05-02 DIAGNOSIS — E11.69 TYPE 2 DIABETES MELLITUS WITH OTHER SPECIFIED COMPLICATION, WITHOUT LONG-TERM CURRENT USE OF INSULIN (HCC): Primary | ICD-10-CM

## 2023-05-02 NOTE — PROGRESS NOTES
Diabetic Foot Exam    Patient's shoes and socks removed  Right Foot/Ankle   Right Foot Inspection  Skin Exam: skin intact  Sensory   Monofilament testing: intact    Vascular  The right DP pulse is 2+  The right PT pulse is 1+  Left Foot/Ankle  Left Foot Inspection      Sensory   Monofilament testing: intact    Vascular  The left DP pulse is 2+  The left PT pulse is 1+  Assessment/Plan:       Diagnoses and all orders for this visit:    Type 2 diabetes mellitus with other specified complication, without long-term current use of insulin ([facility] [address] )  -     Basic metabolic panel; Future  -     Hemoglobin A1C; Future  -     Basic metabolic panel  -     Hemoglobin A1C    Neuropathy    Cervical radiculopathy  -     MRI cervical spine wo contrast; Future                Subjective:      Patient ID: Jodi Garsia is a 60 y o  male  Hypertensive diabetic and dyslipidemic  Several times in the past year A1c was greater than 8  Attempted to give [place] but failed due to urinary issues  Added Victoza and worked him up to 1 8 mg subcutaneously once a day  On this dose, continuing metformin even though he gets some diarrhea side effect, A1c down to 7 0  Blood pressure is controlled  Lipids are controlled     Family history of prostate cancer  Father had prostate cancer  Now his identical twin brother has prostate cancer diagnosed after PSA of 4 03  His PSA was 3 6 but I sent him to Urology   Colonoscopy done 2019    He has some exacerbation of the supraclavicular fat pads  CT of neck was done, and no pathology was noted although a pharyngocele was reported  His only complaint today is some paresthesias involving the third and fourth fingers of both hands  Although the patient is asking about potential shoulder courses, I think this is more in line with the cervical radiculopathy  He gets tingly sensation    It occurs rarely and randomly, perhaps once or twice a month, with no particular exacerbating "or relieving factor, and only lasting a few moments  The following portions of the patient's history were reviewed and updated as appropriate:   He has a past medical history of Asthma, Benign prostatic hyperplasia, Chronic kidney disease, Diabetes mellitus ([facility] [address] ), Erectile dysfunction, Family history of prostate cancer, Hypertension, Kidney stone, Mixed hyperlipidemia, and Prostate cancer ([facility] [address] )  ,  does not have any pertinent problems on file  ,   has a past surgical history that includes Shoulder surgery (2015); Cholecystectomy; Rotator cuff repair; Prostate biopsy; and Vasectomy (1994)  ,  family history includes Cancer in his father; Diabetes in his mother; Hypertension in his brother, father, and mother; Prostate cancer in his father  ,   reports that he has never smoked  He has been exposed to tobacco smoke  He has never used smokeless tobacco  He reports current alcohol use of about 1 0 standard drink per week  He reports current drug use  Frequency: 1 00 time per week  Drug: Marijuana  ,  has No Known Allergies     Current Outpatient Medications   Medication Sig Dispense Refill    amLODIPine (NORVASC) 5 mg tablet Take 1 tablet (5 mg total) by mouth daily 90 tablet 3    aspirin (ECOTRIN LOW STRENGTH) 81 mg EC tablet Take 81 mg by mouth daily      atorvastatin (LIPITOR) 20 mg tablet Take 1 tablet (20 mg total) by mouth daily 90 tablet 3    Dulaglutide (Trulicity) 3 18 VH/4 4FF SOPN Inject 0 5 mL (0 75 mg total) under the skin once a week 6 mL 3    Easy Touch FlipLock Needles 25G X 1\" MISC USE IN THE MORNING 100 each 0    glucose blood test strip Test once daily or as directed 100 each 2    Lancets (onetouch ultrasoft) lancets Test once daily or as directed 100 each 3    metFORMIN (GLUCOPHAGE-XR) 500 mg 24 hr tablet TAKE 4 TABLETS BY MOUTH ONCE DAILY (Patient taking differently: 1,000 mg 2 (two) times a day with meals) 360 tablet 0    metoprolol succinate (TOPROL-XL) 25 mg 24 hr tablet TAKE 1 & 1/2 " (ONE & ONE-HALF) TABLETS BY MOUTH AT BEDTIME 135 tablet 3    omega-3-acid ethyl esters (LOVAZA) 1 g capsule Take 1 capsule (1 g total) by mouth 2 (two) times a day 180 capsule 0    sildenafil (REVATIO) 20 mg tablet TAKE 2 TABLETS BY MOUTH ONCE DAILY 1  HOUR  BEFORE  INTERCOURSE  30 tablet 0    lisinopril (ZESTRIL) 40 mg tablet Take 1 tablet (40 mg total) by mouth daily 30 tablet 0    montelukast (SINGULAIR) 10 mg tablet Take 1 tablet (10 mg total) by mouth daily at bedtime 30 tablet 0     No current facility-administered medications for this visit  Review of Systems   Neurological:        Bilateral median nerve paresthesias as reported in the history   All other systems reviewed and are negative  Objective:  Vitals:    05/02/23 1309   BP: 144/82   Pulse: 62   Resp: 18   Temp: (!) 96 8 °F (36 °C)   SpO2: 96%      Physical Exam  Constitutional:       Appearance: He is well-developed  Comments: An overweight but robust appearing male who appears to be his stated age   HENT:      Head: Normocephalic and atraumatic  Eyes:      Pupils: Pupils are equal, round, and reactive to light  Neck:      Thyroid: No thyromegaly  Trachea: No tracheal deviation  Cardiovascular:      Rate and Rhythm: Normal rate and regular rhythm  Pulses:           Dorsalis pedis pulses are 2+ on the right side and 2+ on the left side  Posterior tibial pulses are 1+ on the right side and 1+ on the left side  Heart sounds: Normal heart sounds  No murmur heard  No gallop  Pulmonary:      Effort: Pulmonary effort is normal  No respiratory distress  Breath sounds: No wheezing or rales  Musculoskeletal:         General: No tenderness or deformity  Normal range of motion  Cervical back: Normal range of motion and neck supple  Skin:     General: Skin is warm and dry  Neurological:      Mental Status: He is alert and oriented to person, place, and time        Coordination: Coordination normal       Deep Tendon Reflexes: Reflexes are normal and symmetric  Psychiatric:         Mood and Affect: Mood normal          Judgment: Judgment normal            Patient Instructions   Recheck A1c and BMP  MRI C-spine  Follow-up with a new physician

## 2023-06-14 LAB
BUN SERPL-MCNC: 18 MG/DL (ref 8–27)
BUN/CREAT SERPL: 16 (ref 10–24)
CALCIUM SERPL-MCNC: 9.6 MG/DL (ref 8.6–10.2)
CHLORIDE SERPL-SCNC: 104 MMOL/L (ref 96–106)
CO2 SERPL-SCNC: 22 MMOL/L (ref 20–29)
CREAT SERPL-MCNC: 1.12 MG/DL (ref 0.76–1.27)
EGFR: 74 ML/MIN/1.73
EST. AVERAGE GLUCOSE BLD GHB EST-MCNC: 137 MG/DL
GLUCOSE SERPL-MCNC: 113 MG/DL (ref 70–99)
HBA1C MFR BLD: 6.4 % (ref 4.8–5.6)
POTASSIUM SERPL-SCNC: 4.7 MMOL/L (ref 3.5–5.2)
SODIUM SERPL-SCNC: 142 MMOL/L (ref 134–144)

## 2023-06-18 DIAGNOSIS — E11.69 TYPE 2 DIABETES MELLITUS WITH OTHER SPECIFIED COMPLICATION, WITHOUT LONG-TERM CURRENT USE OF INSULIN (HCC): ICD-10-CM

## 2023-06-18 DIAGNOSIS — I10 ESSENTIAL HYPERTENSION: ICD-10-CM

## 2023-06-19 ENCOUNTER — TELEPHONE (OUTPATIENT)
Dept: FAMILY MEDICINE CLINIC | Facility: CLINIC | Age: 63
End: 2023-06-19

## 2023-06-19 NOTE — TELEPHONE ENCOUNTER
Patient came into the office asking for an appointment to establish himself and his son with Samantha Collins  He states that his current PCP is retiring soon, and our practice was highly recommended  I was able to fit his son in with Salima del angel, but Ama Diaz would only like to establish with Dr Almanzar  There are not any new patient spots available that I see  May I use a same day and admin time in March or April of 2024?

## 2023-06-21 RX ORDER — SILDENAFIL CITRATE 20 MG/1
TABLET ORAL
Qty: 30 TABLET | Refills: 0 | Status: SHIPPED | OUTPATIENT
Start: 2023-06-21

## 2023-06-21 RX ORDER — OMEGA-3-ACID ETHYL ESTERS 1 G/1
1 CAPSULE, LIQUID FILLED ORAL 2 TIMES DAILY
Qty: 180 CAPSULE | Refills: 0 | Status: SHIPPED | OUTPATIENT
Start: 2023-06-21

## 2023-07-25 ENCOUNTER — OFFICE VISIT (OUTPATIENT)
Dept: CARDIOLOGY CLINIC | Facility: CLINIC | Age: 63
End: 2023-07-25
Payer: COMMERCIAL

## 2023-07-25 VITALS
WEIGHT: 205 LBS | OXYGEN SATURATION: 97 % | DIASTOLIC BLOOD PRESSURE: 72 MMHG | HEIGHT: 69 IN | SYSTOLIC BLOOD PRESSURE: 128 MMHG | BODY MASS INDEX: 30.36 KG/M2 | HEART RATE: 62 BPM | RESPIRATION RATE: 16 BRPM

## 2023-07-25 DIAGNOSIS — E78.2 MIXED HYPERLIPIDEMIA: ICD-10-CM

## 2023-07-25 DIAGNOSIS — I10 ESSENTIAL HYPERTENSION: Primary | ICD-10-CM

## 2023-07-25 DIAGNOSIS — E11.69 TYPE 2 DIABETES MELLITUS WITH OTHER SPECIFIED COMPLICATION, WITHOUT LONG-TERM CURRENT USE OF INSULIN (HCC): ICD-10-CM

## 2023-07-25 PROCEDURE — 99213 OFFICE O/P EST LOW 20 MIN: CPT | Performed by: PHYSICIAN ASSISTANT

## 2023-07-25 PROCEDURE — 93000 ELECTROCARDIOGRAM COMPLETE: CPT | Performed by: PHYSICIAN ASSISTANT

## 2023-07-25 NOTE — ASSESSMENT & PLAN NOTE
Lab Results   Component Value Date    HGBA1C 6.4 (H) 06/13/2023   Moderately well controlled  Followed by PCP

## 2023-07-25 NOTE — PROGRESS NOTES
[facility]   Outpatient Note  Nelly Norris  1960  [id number]  [facility], [facility] [place]  [address]  [place] [id number] [address] [facility]    Nelly Norris is a 60 y.o. male    Assessment and Plan:   Type 2 diabetes mellitus with other specified complication, without long-term current use of insulin ([address])    Lab Results   Component Value Date    HGBA1C 6.4 (H) 06/13/2023   Moderately well controlled  Followed by PCP    Mixed hyperlipidemia  Tolerating low-dose statin therapy  Continue atorvastatin 20 mg daily and omega-3 fish oil    Essential hypertension  Controlled on current medical regimen  Continue amlodipine 5 mg daily lisinopril 40 mg daily and metoprolol 25 mg daily     Additional Plan:   No Medication changes made or testing ordered today. Available lab and test results are reviewed with the patient as noted. Return visit will be in 1 year or earlier if there are problems. The patient is encouraged to call in the meantime if there are questions or concerns. Subjective: The patient is seen in the office today for a follow up regarding HTN, HLD and DMII. He is tolerating medications well. Blood pressure is well controlled on metoprolol lisinopril and Norvasc. He is on atorvastatin for cholesterol control. No myalgias or arthralgias. Labs are reviewed with the patient today. From a cardiac perspective, he is doing well without complaints of chest pain or exertional dyspnea. He has no palpitations syncope or near syncope, and denies edema orthopnea or PND. He does not complain of TIA or CVA symptoms.                Social History  Social History     Tobacco Use   Smoking Status Never   • Passive exposure: Past   Smokeless Tobacco Never   Tobacco Comments    I dont smoke   ,   Social History     Substance and Sexual Activity   Alcohol Use Yes   • Alcohol/week: 1.0 standard drink of alcohol   • Types: 1 Standard drinks or equivalent per week    Comment: sOCIAL   ,   Social History     Substance and Sexual Activity   Drug Use Yes   • Frequency: 1.0 times per week   • Types: Marijuana    Comment: Vape pen     Family History   Problem Relation Age of Onset   • Hypertension Mother    • Diabetes Mother    • Cancer Father    • Hypertension Father    • Prostate cancer Father    • Hypertension Brother        Medical and Surgical History  Past Medical History:   Diagnosis Date   • Asthma    • Benign prostatic hyperplasia    • Chronic kidney disease    • Diabetes mellitus ([address])    • Erectile dysfunction    • Family history of prostate cancer    • Hypertension    • Kidney stone    • Mixed hyperlipidemia    • Prostate cancer ([address])      Past Surgical History:   Procedure Laterality Date   • CHOLECYSTECTOMY     • PROSTATE BIOPSY     • ROTATOR CUFF REPAIR     • SHOULDER SURGERY  2015   • VASECTOMY  1994         Current Outpatient Medications:   •  amLODIPine (NORVASC) 5 mg tablet, Take 1 tablet (5 mg total) by mouth daily, Disp: 90 tablet, Rfl: 3  •  aspirin (ECOTRIN LOW STRENGTH) 81 mg EC tablet, Take 81 mg by mouth daily, Disp: , Rfl:   •  atorvastatin (LIPITOR) 20 mg tablet, Take 1 tablet (20 mg total) by mouth daily, Disp: 90 tablet, Rfl: 3  •  Dulaglutide (Trulicity) 3.43 VI/0.4XM SOPN, Inject 0.5 mL (0.75 mg total) under the skin once a week, Disp: 6 mL, Rfl: 3  •  Easy Touch FlipLock Needles 25G X 1" MISC, USE IN THE MORNING, Disp: 100 each, Rfl: 0  •  glucose blood test strip, Test once daily or as directed, Disp: 100 each, Rfl: 2  •  Lancets (onetouch ultrasoft) lancets, Test once daily or as directed, Disp: 100 each, Rfl: 3  •  metFORMIN (GLUCOPHAGE-XR) 500 mg 24 hr tablet, TAKE 4 TABLETS BY MOUTH ONCE DAILY (Patient taking differently: 1,000 mg 2 (two) times a day with meals), Disp: 360 tablet, Rfl: 0  •  metoprolol succinate (TOPROL-XL) 25 mg 24 hr tablet, TAKE 1 & 1/2 (ONE & ONE-HALF) TABLETS BY MOUTH AT BEDTIME, Disp: 135 tablet, Rfl: 3  •  omega-3-acid ethyl esters (LOVAZA) 1 g capsule, Take 1 capsule (1 g total) by mouth 2 (two) times a day, Disp: 180 capsule, Rfl: 0  •  sildenafil (REVATIO) 20 mg tablet, TAKE 2 TABLETS BY MOUTH ONCE DAILY 1  HOUR  BEFORE  INTERCOURSE., Disp: 30 tablet, Rfl: 0  •  lisinopril (ZESTRIL) 40 mg tablet, Take 1 tablet (40 mg total) by mouth daily, Disp: 30 tablet, Rfl: 0  •  montelukast (SINGULAIR) 10 mg tablet, Take 1 tablet (10 mg total) by mouth daily at bedtime, Disp: 30 tablet, Rfl: 0  No Known Allergies    Review of Systems   Constitutional: Negative. HENT: Negative. Eyes: Negative. Cardiovascular: Negative for chest pain (left wall pressure when lying on left side), claudication (in upper and lower extremities ), cyanosis, dyspnea on exertion, irregular heartbeat, leg swelling, near-syncope, orthopnea, palpitations, paroxysmal nocturnal dyspnea and syncope. Respiratory: Negative. Negative for cough, hemoptysis, shortness of breath, sleep disturbances due to breathing, snoring, sputum production and wheezing. Endocrine: Negative. Hematologic/Lymphatic: Negative. Skin: Negative. Musculoskeletal: Negative. Gastrointestinal: Negative. Genitourinary: Negative. Neurological: Positive for paresthesias. Psychiatric/Behavioral: Negative. Allergic/Immunologic: Negative. Objective:   /72 (BP Location: Left arm, Patient Position: Sitting, Cuff Size: Large)   Pulse 62   Resp 16   Ht 5' 9" (1.753 m)   Wt 93 kg (205 lb)   SpO2 97%   BMI 30.27 kg/m²   Physical Exam  Vitals and nursing note reviewed. Constitutional:       Appearance: He is well-developed. HENT:      Head: Normocephalic and atraumatic. Eyes:      General: No scleral icterus. Conjunctiva/sclera: Conjunctivae normal.   Neck:      Thyroid: No thyromegaly. Vascular: No JVD. Cardiovascular:      Rate and Rhythm: Normal rate and regular rhythm.       Heart sounds: Normal heart sounds. No murmur heard. No friction rub. No gallop. Pulmonary:      Effort: No respiratory distress. Breath sounds: No wheezing or rales. Abdominal:      General: Bowel sounds are normal. There is no distension. Palpations: Abdomen is soft. Tenderness: There is no abdominal tenderness. Comments: Aorta not palpable   Musculoskeletal:         General: No tenderness or deformity. Normal range of motion. Cervical back: Normal range of motion and neck supple. Skin:     General: Skin is warm and dry. Neurological:      Mental Status: He is alert and oriented to person, place, and time.    Psychiatric:         Behavior: Behavior normal.         Lab Review:   No results found for: "CHOL"  Lab Results   Component Value Date    HDL 32 (L) 11/30/2022     Lab Results   Component Value Date    LDLCALC 62 11/30/2022     Lab Results   Component Value Date    TRIG 104 11/30/2022     Results Reviewed     None        Results Reviewed     None        Results Reviewed     None          Recent Cardiovascular Testing:   Holter: 3-14-19: Sinus rhythm with PVC and PACs    Echo 3-11-19: Normal LVF EF 60%, trace MR, TR no WMA    NATI 3-11-19: Normal, No ischemia     ECG Review:   7/25/2023: Sinus bradycardia 52 otherwise normal    Normal sinus Rhythm

## 2023-07-25 NOTE — ASSESSMENT & PLAN NOTE
Controlled on current medical regimen  Continue amlodipine 5 mg daily lisinopril 40 mg daily and metoprolol 25 mg daily

## 2023-08-04 ENCOUNTER — ANESTHESIA EVENT (OUTPATIENT)
Dept: PERIOP | Facility: AMBULARY SURGERY CENTER | Age: 63
End: 2023-08-04
Payer: COMMERCIAL

## 2023-08-08 ENCOUNTER — APPOINTMENT (OUTPATIENT)
Dept: PREADMISSION TESTING | Facility: HOSPITAL | Age: 63
End: 2023-08-08
Payer: COMMERCIAL

## 2023-08-10 ENCOUNTER — TELEPHONE (OUTPATIENT)
Dept: ADMINISTRATIVE | Facility: OTHER | Age: 63
End: 2023-08-10

## 2023-08-10 ENCOUNTER — OFFICE VISIT (OUTPATIENT)
Dept: FAMILY MEDICINE CLINIC | Facility: CLINIC | Age: 63
End: 2023-08-10
Payer: COMMERCIAL

## 2023-08-10 VITALS
SYSTOLIC BLOOD PRESSURE: 122 MMHG | HEART RATE: 82 BPM | WEIGHT: 209 LBS | BODY MASS INDEX: 30.96 KG/M2 | OXYGEN SATURATION: 98 % | HEIGHT: 69 IN | TEMPERATURE: 98.2 F | DIASTOLIC BLOOD PRESSURE: 82 MMHG

## 2023-08-10 DIAGNOSIS — E11.69 TYPE 2 DIABETES MELLITUS WITH OTHER SPECIFIED COMPLICATION, WITHOUT LONG-TERM CURRENT USE OF INSULIN (HCC): ICD-10-CM

## 2023-08-10 DIAGNOSIS — R20.0 NUMBNESS AND TINGLING IN RIGHT HAND: ICD-10-CM

## 2023-08-10 DIAGNOSIS — C61 PROSTATE CANCER (HCC): ICD-10-CM

## 2023-08-10 DIAGNOSIS — L23.7 POISON IVY: ICD-10-CM

## 2023-08-10 DIAGNOSIS — R20.2 NUMBNESS AND TINGLING IN RIGHT HAND: ICD-10-CM

## 2023-08-10 DIAGNOSIS — Z01.818 PREOPERATIVE CLEARANCE: Primary | ICD-10-CM

## 2023-08-10 DIAGNOSIS — I10 ESSENTIAL HYPERTENSION: ICD-10-CM

## 2023-08-10 PROCEDURE — 99204 OFFICE O/P NEW MOD 45 MIN: CPT | Performed by: FAMILY MEDICINE

## 2023-08-10 RX ORDER — METFORMIN HYDROCHLORIDE 500 MG/1
TABLET, EXTENDED RELEASE ORAL
Qty: 180 TABLET | Refills: 1 | Status: SHIPPED | OUTPATIENT
Start: 2023-08-10 | End: 2023-08-21 | Stop reason: SDUPTHER

## 2023-08-10 RX ORDER — CLOBETASOL PROPIONATE 0.5 MG/G
CREAM TOPICAL 2 TIMES DAILY
Qty: 30 G | Refills: 1 | Status: SHIPPED | OUTPATIENT
Start: 2023-08-10

## 2023-08-10 NOTE — PROGRESS NOTES
Patient ID: Solomon Lovett is a 60 y.o. male. HPI: 60 y.o.male is being seen to be established and for a preoperative visit prostate Bx under anesthesia (Dr Sarah Meade)  - also has poison ivy on arms and legs x 8d. - has been experiencing numbness in R 2nd and 3rd fingers (and occ his thumb), mostly at night, but occasionally during the day when working with arms above his head. Had shoulder surgery in 2016. No weakness  - BGs well controlled. - slowly increasing L supraclavicular fat pad. CT showed lipoma. - I reviewed PMHx, PSHx, Fam Hx and Soc Hx with pt.   - full ROS done      Surgical Risk Assessment:    Prior anesthesia: Adverse Reaction to : Epidural n    General n   Spinal n  Family history of adverse reactions to anesthesia? Pertinent Past Medical History:  DMII, HTN, Yeni 6 (3+3) prostate CA, RAD    Exercise Capacity:     Able to walk 4 blocks w/o Sx       yes       Able to walk 2 flights of steps w/o Sx   yes    Lifestyle Factors: Tobacco Use:  None        Pack years  Alcohol Use:  rare  Illicit Drug Use:  none  No transfusions : Protestant   no     RADHIKA Risk Factors: none    Personal history of venous thromboembolic disease:  none  History of Steroid use for >2 weeks within last year? Family History   Problem Relation Age of Onset   • Hypertension Mother    • Diabetes Mother    • Cancer Father    • Hypertension Father    • Prostate cancer Father    • Hypertension Brother      Social History     Socioeconomic History   • Marital status: /Civil Union     Spouse name: Not on file   • Number of children: Not on file   • Years of education: Not on file   • Highest education level: Not on file   Occupational History   • Not on file   Tobacco Use   • Smoking status: Never     Passive exposure: Past   • Smokeless tobacco: Never   • Tobacco comments:     I dont smoke   Vaping Use   • Vaping Use: Never used   Substance and Sexual Activity   • Alcohol use:  Yes     Alcohol/week: 1.0 standard drink of alcohol     Types: 1 Standard drinks or equivalent per week     Comment: sOCIAL   • Drug use: Yes     Frequency: 1.0 times per week     Types: Marijuana     Comment: Vape pen   • Sexual activity: Yes     Partners: Female     Birth control/protection: None   Other Topics Concern   • Not on file   Social History Narrative   • Not on file     Social Determinants of Health     Financial Resource Strain: Not on file   Food Insecurity: Not on file   Transportation Needs: Not on file   Physical Activity: Inactive (10/5/2020)    Exercise Vital Sign    • Days of Exercise per Week: 0 days    • Minutes of Exercise per Session: 0 min   Stress: No Stress Concern Present (11/1/2022)    [facility]    • Feeling of Stress :  Only a little   Social Connections: Not on file   Intimate Partner Violence: Not on file   Housing Stability: Not on file     Past Medical History:   Diagnosis Date   • Asthma    • Benign prostatic hyperplasia    • Chronic kidney disease    • Diabetes mellitus ([address] )    • Erectile dysfunction    • Family history of prostate cancer    • Hypertension    • Kidney stone    • Mixed hyperlipidemia    • Prostate cancer ([address])      Past Surgical History:   Procedure Laterality Date   • CHOLECYSTECTOMY     • PROSTATE BIOPSY     • ROTATOR CUFF REPAIR     • SHOULDER SURGERY  2015   • VASECTOMY  1994     No Known Allergies    Current Outpatient Medications:   •  amLODIPine (NORVASC) 5 mg tablet, Take 1 tablet (5 mg total) by mouth daily, Disp: 90 tablet, Rfl: 3  •  aspirin (ECOTRIN LOW STRENGTH) 81 mg EC tablet, Take 81 mg by mouth daily, Disp: , Rfl:   •  atorvastatin (LIPITOR) 20 mg tablet, Take 1 tablet (20 mg total) by mouth daily, Disp: 90 tablet, Rfl: 3  •  clobetasol (TEMOVATE) 0.05 % cream, Apply topically 2 (two) times a day, Disp: 30 g, Rfl: 1  •  Dulaglutide (Trulicity) 2.71 ZY/0.5IF SOPN, Inject 0.5 mL (0.75 mg total) under the skin once a week, Disp: 6 mL, Rfl: 3  •  Easy Touch FlipLock Needles 25G X 1" MISC, USE IN THE MORNING, Disp: 100 each, Rfl: 0  •  glucose blood test strip, Test once daily or as directed, Disp: 100 each, Rfl: 2  •  Lancets (onetouch ultrasoft) lancets, Test once daily or as directed, Disp: 100 each, Rfl: 3  •  metFORMIN (GLUCOPHAGE-XR) 500 mg 24 hr tablet, 1 tab po bid, Disp: 180 tablet, Rfl: 1  •  lisinopril (ZESTRIL) 40 mg tablet, Take 1 tablet (40 mg total) by mouth daily, Disp: 30 tablet, Rfl: 0  •  metoprolol succinate (TOPROL-XL) 25 mg 24 hr tablet, TAKE 1 & 1/2 (ONE & ONE-HALF) TABLETS BY MOUTH AT BEDTIME, Disp: 135 tablet, Rfl: 3  •  montelukast (SINGULAIR) 10 mg tablet, Take 1 tablet (10 mg total) by mouth daily at bedtime, Disp: 30 tablet, Rfl: 0  •  omega-3-acid ethyl esters (LOVAZA) 1 g capsule, Take 1 capsule (1 g total) by mouth 2 (two) times a day, Disp: 180 capsule, Rfl: 0  •  sildenafil (REVATIO) 20 mg tablet, TAKE 2 TABLETS BY MOUTH ONCE DAILY 1  HOUR  BEFORE  INTERCOURSE., Disp: 30 tablet, Rfl: 0     Review of Systems    Consitutional:  Denies, chills, fever and malaise   ENT:  Denies, blurry vision, double vision, ear pain/pressure, loss of hearing and oral lesion (s)   Pulmonary:  No cough, shortness of breath, dyspnea on exertion    Cardiovascular:  Denies chest pain/pressure   Abdomen:   Denies abdominal pain, nausea, vomiting, diarrhea, constipation    Genitourinary:  positive for and change in urinary stream   Hematology/Lymphatics:   Denies, blood clots, bruising, jaundice, night sweats   Musculoskeletal:  No gait disturbance, myalgia,  arthalgia or muscle weakness    Integumentary:  Denies ecchymosis, petechiae, rash or lesions   Neurological:  Positive for  and numbness/tingling  Psychological:  Denies anxiety, depression or sleep disturbances      OBJECTIVE    /82 (BP Location: Left arm, Patient Position: Sitting, Cuff Size: Adult)   Pulse 82   Temp 98.2 °F (36.8 °C)   Ht 5' 9" (1.753 m)   Wt 94.8 kg (209 lb)   SpO2 98%   BMI 30.86 kg/m²     Constitutional:  Well appearing and in no acute distress  ENT:  ENT exam normal, no neck nodes or sinus tenderness   Pulmonary:  clear to auscultation bilaterally, no crackles, no wheezes, chest expansion normal and normal percussion bilaterally  Cardiovascular:  S1S2, regular rate and rhythm  Gastrointestinal:  abdomen is soft without significant tenderness  Lymphatic:  no lymphadenopathy   Musculoskeletal:  abnormal exam of right should with mild discomfort with full abduction/anterior flexion. Mildly positive tinnels sign at R wrist. Neg phalens test  Skin:  scattered rhus dermatitis on arms. Neurologic:  Alert and oriented x 4, No motor deficits, CN I-XII intact, Normal Reflexes and Normal Sensation    Patient's shoes and socks removed. Right Foot/Ankle   Right Foot Inspection  Skin Exam: skin normal and skin intact. No dry skin, no warmth, no callus, no erythema, no maceration, no abnormal color, no pre-ulcer, no ulcer and no callus. Toe Exam: ROM and strength within normal limits. Sensory   Vibration: intact  Monofilament testing: intact    Vascular  Capillary refills: < 3 seconds  The right DP pulse is 2+. Left Foot/Ankle  Left Foot Inspection  Skin Exam: skin normal and skin intact. No dry skin, no warmth, no erythema, no maceration, normal color, no pre-ulcer, no ulcer and no callus. Toe Exam: ROM and strength within normal limits. Sensory   Vibration: intact  Monofilament testing: intact    Vascular  Capillary refills: < 3 seconds  The left DP pulse is 2+.      Assign Risk Category  No deformity present  No loss of protective sensation  No weak pulses  Risk: 0    DATA:  Laboratory Results: .   Lab Results   Component Value Date    ALT 17 11/30/2022    AST 15 11/30/2022    BUN 18 06/13/2023    CALCIUM 8.8 03/10/2019     06/13/2023    CO2 22 06/13/2023    CREATININE 1.12 06/13/2023    HDL 32 (L) 11/30/2022 HCT 43.1 11/30/2022    HGB 14.3 11/30/2022    HGBA1C 6.4 (H) 06/13/2023    MG 1.7 03/10/2019    PHOS 3.5 03/10/2019     11/30/2022    K 4.7 06/13/2023    PSA 3.1 11/30/2022    TRIG 104 11/30/2022    WBC 6.9 11/30/2022     ECG: previously done on 7/25 - sinus bradycardia. No other significant findings    Patient Clearance:Risk Estimation: per the Revised Cardiac Risk Index (Circ. 100:1043, 1999): the patient's risk factors for cardiac complications include:   RCI Risk Class: 1    Current medications which may produce withdrawal symptoms if withheld perioperatively:    Pre-op Evaluation Plan  1. Further preoperative work-up as follows:  2. Medication Management/Recommendations:  3. Prophylaxis for cardiac events with perioperative beta-blockers:     Clearance:  Patient is CLEARED for surgery without any additional cardiac testing      Assessment/Plan:  Diagnoses and all orders for this visit:    Preoperative clearance    Prostate cancer ([address])    Type 2 diabetes mellitus with other specified complication, without long-term current use of insulin (HCC)  -     metFORMIN (GLUCOPHAGE-XR) 500 mg 24 hr tablet; 1 tab po bid    Poison ivy  -     clobetasol (TEMOVATE) 0.05 % cream; Apply topically 2 (two) times a day    Essential hypertension    Numbness and tingling in right hand      Discussion: All with patient.  -In regards to his preop clearance, patient is medically cleared for prostate biopsy under anesthesia for follow-up of his [place] stage VI (3+3) prostate cancer. Follow-up 6 months  - In regards to his diabetes type 2, recent A1c was 6.4. Urged diet control. Because of some loose bowel movements, we will decrease his metformin to 500 mg twice daily. Recheck 6 months-earlier if blood sugars are increased  -In regards to his poison ivy, will start clobetasol cream twice daily as directed for 1 week to help with symptoms. Otherwise observe.   Recheck 1 week if not resolved  -In regards to his right hand numbness/tingling. I could not reproduce symptoms with his neck movement. Patient did have mild symptoms with Tinel's test though Phalen's was unremarkable. Patient does have a history of shoulder surgery and does feel that symptoms may worsen when he is working over his head. Discussed for therapy and conservative measures. This point will recommend observation. Recheck if symptoms worsen  -In regards to hypertension, blood pressure is well controlled on present treatment. Patient will follow-up in 6 months-earlier if needed.   Patient to call for any other problems or concerns in the interim

## 2023-08-10 NOTE — LETTER
Diabetic Eye Exam Form    Date Requested: 23  Patient: Celio Page  Patient : 1960   Referring Provider: Ottis Brunner, MD      DIABETIC Eye Exam Date _______________________________      Type of Exam MUST be documented for Diabetic Eye Exams. Please CHECK ONE. Retinal Exam       Dilated Retinal Exam       OCT       Optomap-Iris Exam      Fundus Photography       Left Eye - Please check Retinopathy or No Retinopathy        Exam did show retinopathy    Exam did not show retinopathy       Right Eye - Please check Retinopathy or No Retinopathy       Exam did show retinopathy    Exam did not show retinopathy       Comments __________________________________________________________    Practice Providing Exam ______________________________________________    Exam Performed By (print name) _______________________________________      Provider Signature ___________________________________________________      These reports are needed for  compliance. Please fax this completed form and a copy of the Diabetic Eye Exam report to our office located at [address] [place] [address] as soon as possible via Fax [phone] [facility] Eva Winn: Phone [phone]  We thank you for your assistance in treating our mutual patient.     [address] [place] [address] Rd - [phone] F [phone]

## 2023-08-10 NOTE — TELEPHONE ENCOUNTER
----- Message from Shane Powers, [address] sent at 8/10/2023  9:56 AM EDT -----  Regarding: care gap request  08/10/23 9:56 AM    Hello, our patient attached above has had Diabetic Eye Exam completed/performed. Please assist in updating the patient chart by making an External outreach to [facility] facility located in [place]. The date of service is 2022 and 2023.     Thank you,  Shane Powers  [facility] MARICHUY Raymond

## 2023-08-16 NOTE — ANESTHESIA PREPROCEDURE EVALUATION
Procedure:  TRANSPERINEAL MRI FUSION BIOPSY PROSTATE (Perineum)    Relevant Problems   CARDIO   (+) Essential hypertension   (+) Mixed hyperlipidemia      ENDO   (+) Type 2 diabetes mellitus with other specified complication, without long-term current use of insulin (HCC)      /RENAL   (+) Prostate cancer (HCC)      NEURO/PSYCH   (+) Claudication (HCC)      PULMONARY   (+) Asthma      Asthma - well-controlled  CKD    Physical Exam    Airway    Mallampati score: III  TM Distance: <3 FB  Neck ROM: full     Dental   Comment: None loose,     Cardiovascular      Pulmonary      Other Findings           Latest Reference Range & Units 08/12/23 11:18   Sodium 134 - 144 mmol/L 139   Potassium 3.5 - 5.2 mmol/L 4.8   Chloride 96 - 106 mmol/L 102   CO2 20 - 29 mmol/L 23   BUN 8 - 27 mg/dL 22   Creatinine 0.76 - 1.27 mg/dL 1.11   SL AMB BUN/CREATININE RATIO 10 - 24  20   Glucose, Random 70 - 99 mg/dL 114 (H)   AST 0 - 40 IU/L 17   ALT 0 - 44 IU/L 15   Total Protein 6.0 - 8.5 g/dL 6.6   Albumin 3.9 - 4.9 g/dL 4.6   TOTAL BILIRUBIN 0.0 - 1.2 mg/dL 0.4   eGFR >59 mL/min/1.73 75   Albumin/Globulin Ratio 1.2 - 2.2  2.3 (H)   (H): Data is abnormally high     Latest Reference Range & Units 08/12/23 11:18   White Blood Cell Count 3.4 - 10.8 x10E3/uL 7.2   Red Blood Cell Count 4.14 - 5.80 x10E6/uL 4.40   Hemoglobin 13.0 - 17.7 g/dL 13.8   HCT 37.5 - 51.0 % 39.9   MCV 79 - 97 fL 91   MCH 26.6 - 33.0 pg 31.4   MCHC. 31.5 - 35.7 g/dL 34.6   RDW 11.6 - 15.4 % 12.5   Platelet Count 749 - 450 x10E3/uL 276       STRESS ECHO (3/2019)  STRESS RESULTS:  Duration of exercise was 8 min and 0 sec. Maximal work rate was 10.4 METs. Maximal heart rate during stress was 151 bpm ( 93 % of maximal predicted heart rate). Target heart rate was achieved. There was no chest pain during stress.     ECG CONCLUSIONS:  The stress ECG was negative for ischemia.     BASELINE:  There were no regional wall motion abnormalities.   Estimated left ventricular ejection fraction was 55 % .     PEAK STRESS:  There were no regional wall motion abnormalities. Anesthesia Plan  ASA Score- 3     Anesthesia Type- IV sedation with anesthesia with ASA Monitors. Additional Monitors:   Airway Plan:     Comment: NPO after MN    Patient educated on the possibility for awareness under sedation and of the possibility of airway intervention in the event of an airway or procedural emergency    Patient educated on the possibility for awareness under sedation and of the possibility of airway intervention in the event of an airway or procedural emergency    Patient requesting to keep mouthguard in place due to concern for teeth grinding that can damage tooth filling. Mouthguard examined - large and highly unlikely to cause airway obstruction. Will permit patient to keep in place. Plan Factors-Exercise tolerance (METS): >4 METS. Chart reviewed. Existing labs reviewed. Patient summary reviewed. Patient is a current smoker (occasionally). Patient instructed to abstain from smoking on day of procedure. Patient did not smoke on day of surgery. Induction- intravenous. Postoperative Plan-     Informed Consent- Anesthetic plan and risks discussed with patient. I personally reviewed this patient with the CRNA. Discussed and agreed on the Anesthesia Plan with the CRNA. Zeferino Millard

## 2023-08-17 ENCOUNTER — ANESTHESIA (OUTPATIENT)
Dept: PERIOP | Facility: AMBULARY SURGERY CENTER | Age: 63
End: 2023-08-17
Payer: COMMERCIAL

## 2023-08-17 ENCOUNTER — HOSPITAL ENCOUNTER (OUTPATIENT)
Facility: AMBULARY SURGERY CENTER | Age: 63
Setting detail: OUTPATIENT SURGERY
Discharge: HOME | End: 2023-08-17
Attending: UROLOGY | Admitting: UROLOGY
Payer: COMMERCIAL

## 2023-08-17 VITALS
SYSTOLIC BLOOD PRESSURE: 141 MMHG | HEIGHT: 69 IN | TEMPERATURE: 97.2 F | BODY MASS INDEX: 31.1 KG/M2 | RESPIRATION RATE: 16 BRPM | DIASTOLIC BLOOD PRESSURE: 71 MMHG | HEART RATE: 53 BPM | OXYGEN SATURATION: 97 % | WEIGHT: 210 LBS

## 2023-08-17 DIAGNOSIS — C61 MALIGNANT NEOPLASM OF PROSTATE (HCC): ICD-10-CM

## 2023-08-17 LAB
GLUCOSE SERPL-MCNC: 121 MG/DL (ref 65–140)
GLUCOSE SERPL-MCNC: 124 MG/DL (ref 65–140)

## 2023-08-17 PROCEDURE — 88344 IMHCHEM/IMCYTCHM EA MLT ANTB: CPT | Performed by: PATHOLOGY

## 2023-08-17 PROCEDURE — 55700 PR PROSTATE NEEDLE BIOPSY ANY APPROACH: CPT | Performed by: UROLOGY

## 2023-08-17 PROCEDURE — G0416 PROSTATE BIOPSY, ANY MTHD: HCPCS | Performed by: PATHOLOGY

## 2023-08-17 PROCEDURE — 82948 REAGENT STRIP/BLOOD GLUCOSE: CPT

## 2023-08-17 PROCEDURE — NC001 PR NO CHARGE: Performed by: UROLOGY

## 2023-08-17 PROCEDURE — 76942 ECHO GUIDE FOR BIOPSY: CPT | Performed by: UROLOGY

## 2023-08-17 RX ORDER — CEFAZOLIN SODIUM 2 G/50ML
2000 SOLUTION INTRAVENOUS
Status: COMPLETED | OUTPATIENT
Start: 2023-08-17 | End: 2023-08-17

## 2023-08-17 RX ORDER — MAGNESIUM HYDROXIDE 1200 MG/15ML
LIQUID ORAL AS NEEDED
Status: DISCONTINUED | OUTPATIENT
Start: 2023-08-17 | End: 2023-08-17 | Stop reason: HOSPADM

## 2023-08-17 RX ORDER — SODIUM CHLORIDE, SODIUM LACTATE, POTASSIUM CHLORIDE, CALCIUM CHLORIDE 600; 310; 30; 20 MG/100ML; MG/100ML; MG/100ML; MG/100ML
100 INJECTION, SOLUTION INTRAVENOUS CONTINUOUS
Status: CANCELLED | OUTPATIENT
Start: 2023-08-17

## 2023-08-17 RX ORDER — SODIUM CHLORIDE, SODIUM LACTATE, POTASSIUM CHLORIDE, CALCIUM CHLORIDE 600; 310; 30; 20 MG/100ML; MG/100ML; MG/100ML; MG/100ML
INJECTION, SOLUTION INTRAVENOUS CONTINUOUS PRN
Status: DISCONTINUED | OUTPATIENT
Start: 2023-08-17 | End: 2023-08-17

## 2023-08-17 RX ORDER — FENTANYL CITRATE 50 UG/ML
INJECTION, SOLUTION INTRAMUSCULAR; INTRAVENOUS AS NEEDED
Status: DISCONTINUED | OUTPATIENT
Start: 2023-08-17 | End: 2023-08-17

## 2023-08-17 RX ORDER — PROPOFOL 10 MG/ML
INJECTION, EMULSION INTRAVENOUS AS NEEDED
Status: DISCONTINUED | OUTPATIENT
Start: 2023-08-17 | End: 2023-08-17

## 2023-08-17 RX ORDER — LIDOCAINE HYDROCHLORIDE 20 MG/ML
INJECTION, SOLUTION EPIDURAL; INFILTRATION; INTRACAUDAL; PERINEURAL AS NEEDED
Status: DISCONTINUED | OUTPATIENT
Start: 2023-08-17 | End: 2023-08-17 | Stop reason: HOSPADM

## 2023-08-17 RX ORDER — ONDANSETRON 2 MG/ML
INJECTION INTRAMUSCULAR; INTRAVENOUS AS NEEDED
Status: DISCONTINUED | OUTPATIENT
Start: 2023-08-17 | End: 2023-08-17

## 2023-08-17 RX ORDER — LIDOCAINE HYDROCHLORIDE 10 MG/ML
INJECTION, SOLUTION EPIDURAL; INFILTRATION; INTRACAUDAL; PERINEURAL AS NEEDED
Status: DISCONTINUED | OUTPATIENT
Start: 2023-08-17 | End: 2023-08-17

## 2023-08-17 RX ORDER — PROPOFOL 10 MG/ML
INJECTION, EMULSION INTRAVENOUS CONTINUOUS PRN
Status: DISCONTINUED | OUTPATIENT
Start: 2023-08-17 | End: 2023-08-17

## 2023-08-17 RX ORDER — DEXAMETHASONE SODIUM PHOSPHATE 10 MG/ML
INJECTION, SOLUTION INTRAMUSCULAR; INTRAVENOUS AS NEEDED
Status: DISCONTINUED | OUTPATIENT
Start: 2023-08-17 | End: 2023-08-17

## 2023-08-17 RX ORDER — FENTANYL CITRATE/PF 50 MCG/ML
25 SYRINGE (ML) INJECTION
Status: DISCONTINUED | OUTPATIENT
Start: 2023-08-17 | End: 2023-08-17 | Stop reason: HOSPADM

## 2023-08-17 RX ADMIN — FENTANYL CITRATE 25 MCG: 50 INJECTION, SOLUTION INTRAMUSCULAR; INTRAVENOUS at 09:06

## 2023-08-17 RX ADMIN — PROPOFOL 100 MCG/KG/MIN: 10 INJECTION, EMULSION INTRAVENOUS at 08:40

## 2023-08-17 RX ADMIN — LIDOCAINE HYDROCHLORIDE 50 MG: 10 INJECTION, SOLUTION EPIDURAL; INFILTRATION; INTRACAUDAL; PERINEURAL at 08:40

## 2023-08-17 RX ADMIN — FENTANYL CITRATE 25 MCG: 50 INJECTION, SOLUTION INTRAMUSCULAR; INTRAVENOUS at 08:44

## 2023-08-17 RX ADMIN — DEXAMETHASONE SODIUM PHOSPHATE 10 MG: 10 INJECTION, SOLUTION INTRAMUSCULAR; INTRAVENOUS at 08:40

## 2023-08-17 RX ADMIN — FENTANYL CITRATE 50 MCG: 50 INJECTION, SOLUTION INTRAMUSCULAR; INTRAVENOUS at 08:36

## 2023-08-17 RX ADMIN — ONDANSETRON 4 MG: 2 INJECTION INTRAMUSCULAR; INTRAVENOUS at 09:05

## 2023-08-17 RX ADMIN — CEFAZOLIN SODIUM 2000 MG: 2 SOLUTION INTRAVENOUS at 08:38

## 2023-08-17 RX ADMIN — SODIUM CHLORIDE, SODIUM LACTATE, POTASSIUM CHLORIDE, AND CALCIUM CHLORIDE: .6; .31; .03; .02 INJECTION, SOLUTION INTRAVENOUS at 08:28

## 2023-08-17 RX ADMIN — PROPOFOL 30 MG: 10 INJECTION, EMULSION INTRAVENOUS at 08:44

## 2023-08-17 NOTE — OP NOTE
OPERATIVE REPORT  PATIENT NAME: Tim Stevens    :  1960  MRN: [id number]  Pt Location: [facility] OR [facility] 04    SURGERY DATE: 2023    Surgeon(s) and Role:     * Adiel Phipps MD - Primary     * Dakota Espinosa DO - Observing    Preop Diagnosis:  Malignant neoplasm of prostate ([address]) Jorge Herrera    Post-Op Diagnosis Codes:     * Malignant neoplasm of prostate ([address]) Jorge Herrera    Procedure(s):  TRANSPERINEAL MRI FUSION BIOPSY PROSTATE    Specimen(s):  ID Type Source Tests Collected by Time Destination   1 : RIGHT ANTERIOR MEDIAL Tissue Prostate TISSUE EXAM Adiel Phipps MD 2023    2 : RIGHT ANTERIOR LATERAL Tissue Prostate TISSUE EXAM Adiel Phipps MD 2023    3 : RIGHT POSTERIOR LATERAL Tissue Prostate TISSUE EXAM Adiel Phipps MD 2023    4 : LEFT ANTERIOR MEDIAL Tissue Prostate TISSUE EXAM Adiel Phipps MD 2023    5 : LEFT ANTERIOR LATERAL Tissue Prostate TISSUE EXAM Adiel Phipps MD 2023    6 : LEFT POSTERIOR LATERAL Tissue Prostate TISSUE EXAM Adiel Phipps MD 2023    7 : LEFT POSTERIOR MEDIAL Tissue Prostate TISSUE EXAM Adiel Phipps MD 2023    8 : LEFT BASE Tissue Prostate TISSUE EXAM Adiel Phipps MD 2023    9 : RIGHT POSTERIOR MEDIAL Tissue Prostate TISSUE EXAM Adiel Phipps MD 2023    10 : RIGHT BASE Tissue Prostate TISSUE EXAM Adiel Phipps MD 2023    11 : left base lesion Tissue Prostate TISSUE EXAM Adiel Phipps MD 2023 0845        Estimated Blood Loss:   Minimal    Drains:  * No LDAs found *    Anesthesia Type:   IV Sedation with Anesthesia    Operative Indications:  Malignant neoplasm of prostate ([address]) Jorge Herrera    FINDINGS:  - A total of 24 samples are taken, in order to provide saturation sampling to maximize diagnostic sensitivity  - 4 samples are taken from AMOS #1, which was labelled left base lesion        The patient is  is here for transperineal prostate saturation biopsy guided by biplanar transrectal ultrasound using the Precision Point Perineologic kit and using the uroNav device for MR fusion. The procedure, as well as the risks of bleeding, infection, and urinary retention have been explained he gives informed consent. The patient was brought to the operating room and identified properly. A time-out was performed. IV sedation was induced, and he was placed in the dorsal lithotomy position. The perineum shaved, and IO band was used to lift the scrotum away from the perineum. ChloraPrep was used to prep the perineum. Care was taken when placing the patient in the dorsal lithotomy position to make sure all pressure points were protected. .  The biplanar ultrasound probe was prepared with a condom and a 1 in wide piece of tape near the handle to place the precision Point kit on that., and I placed the Precision Point device with a clamp over the midportion of the ultrasound once near the handle over the tape. The aperture and guide needle were also prepared to be used later, and I placed the transrectal ultrasound probe into the rectum and visualized the prostate in sagittal and transverse views. This was used with a split screen. Transverse view was obtained, Volumetric sweep was performed for the purpose of fusing the MRI images with the ultrasound images. The perineum was anesthetized with 2% xylocaine 10 cc total both superficially and deep with a spinal needle on both sides of the median raphe. I then performed biopsies of the region of interest after placing the introducer needle into the perineum and took multiple biopsies with MR fusion guidance. Realtime ultrasound is used in addition to the Enterprise verification of each target sampling. Addition I verified on the back table that adequate sampling was obtained grossly from each specimen.   Multiple samples were taken from the targeted lesion in order provide full saturation of the region of concern on MRI. I then performed standard transperineal prostate biopsy without MR guidance, taking the biopsies from the peripheral zone in the standard fashion of anterior medial anterior lateral and posterior medial posterior lateral and base region bilaterally. The top and second to top aperture settings were used to get the anterior zones, and the second to bottom aperture settings were used to obtain the posterior zones. These were all peripheral zone biopsies. Extra biopsies were taken in zones where the initial biopsy was suboptimal tissue. Care was taken to try to include the entire length of the prostate as much as possible for complete saturation coverage. Excellent prostate tissue cores were obtained, and specimens were sent to pathology. I withdrew the guide needle and I held pressure on the perineum for 1 minutes using gauze sponges. The perineum was then cleansed with sterile water.     Claire Piper MD          SIGNATURE: Claire Piper MD  DATE: August 17, 2023  TIME: 9:56 AM

## 2023-08-17 NOTE — ANESTHESIA POSTPROCEDURE EVALUATION
Post-Op Assessment Note    CV Status:  Stable  Pain Score: 0    Pain management: adequate     Mental Status:  Alert and awake   Hydration Status:  Euvolemic   PONV Controlled:  Controlled   Airway Patency:  Patent      Post Op Vitals Reviewed: Yes      Staff: Anesthesiologist, CRNA         There were no known notable events for this encounter.     /79   Temp  97.1   Pulse  60   Resp   16   SpO2   98

## 2023-08-17 NOTE — H&P
UROLOGY HISTORY AND PHYSICAL     Patient Identifiers: Shane Wang (MRN [id number])      Date of Service: 8/17/2023        ASSESSMENT:     60 y.o. old male with  prostate cancer on active surveillance presents for fusion biopsy. PLAN:     Transperineal fusion biopsy      History of Present Illness:     Shane Wang is a 60 y.o. old with a history of prostate cancer on active surveillance    Past Medical, Past Surgical History:     Past Medical History:   Diagnosis Date   • Asthma    • Benign prostatic hyperplasia    • Chronic kidney disease    • Diabetes mellitus ([address])    • Erectile dysfunction    • Family history of prostate cancer    • Hypertension    • Kidney stone    • Mixed hyperlipidemia    • Prostate cancer ([address])    :    Past Surgical History:   Procedure Laterality Date   • CHOLECYSTECTOMY     • PROSTATE BIOPSY     • ROTATOR CUFF REPAIR     • SHOULDER SURGERY  2015   • VASECTOMY  1994   :    Medications, Allergies:     Current Facility-Administered Medications:   •  ceFAZolin (ANCEF) IVPB (premix in dextrose) 2,000 mg 50 mL, 2,000 mg, Intravenous, On Call To OR, Claire Piper MD    Allergies:  No Known Allergies:    Social and Family History:   Social History:   Social History     Tobacco Use   • Smoking status: Never     Passive exposure: Past   • Smokeless tobacco: Never   • Tobacco comments:     I dont smoke   Vaping Use   • Vaping Use: Never used   Substance Use Topics   • Alcohol use: Yes     Alcohol/week: 1.0 standard drink of alcohol     Types: 1 Standard drinks or equivalent per week     Comment: sOCIAL   • Drug use: Yes     Frequency: 1.0 times per week     Types: Marijuana     Comment: Vape pen   .     Social History     Tobacco Use   Smoking Status Never   • Passive exposure: Past   Smokeless Tobacco Never   Tobacco Comments    I dont smoke       Family History:  Family History   Problem Relation Age of Onset   • Hypertension Mother    • Diabetes Mother    • Cancer Father    • Hypertension Father    • Prostate cancer Father    • Hypertension Brother    :     Review of Systems:     General: Fever, chills, or night sweats: negative  Cardiac: Negative for chest pain. Pulmonary: Negative for shortness of breath. Gastrointestinal: Abdominal pain negative  Nausea, vomiting, or diarrhea negative  Genitourinary: See HPI above. Patient does nothave hematuria. All other systems queried were negative. Physical Exam:   General: Patient is pleasant and in NAD. Awake and alert  There were no vitals taken for this visit. HEENT:  Normocephalic atraumatic  Cardiac:  Regular rate and rhythm, Peripheral edema: negative  Pulmonary: Non-labored breathing, CTAB  Abdomen: Soft, non-tender, non-distended. No surgical scars. No masses, tenderness, hernias noted. Genitourinary: negative CVA tenderness, neg suprapubic tenderness. Extremities: normal movement in all 4       Labs:     Lab Results   Component Value Date    HGB 13.8 08/12/2023    HCT 39.9 08/12/2023    WBC 7.2 08/12/2023     08/12/2023   ]    Lab Results   Component Value Date    K 4.8 08/12/2023     08/12/2023    CO2 23 08/12/2023    BUN 22 08/12/2023    CREATININE 1.11 08/12/2023    CALCIUM 8.8 03/10/2019   ]    Imaging:   I personally reviewed the images and report of the following studies, and reviewed them with the patient:        Thank you for allowing me to participate in this patients’ care. Please do not hesitate to call with any additional questions.   Jada Quick MD

## 2023-08-21 ENCOUNTER — TELEPHONE (OUTPATIENT)
Dept: FAMILY MEDICINE CLINIC | Facility: CLINIC | Age: 63
End: 2023-08-21

## 2023-08-21 DIAGNOSIS — E78.2 MIXED HYPERLIPIDEMIA: ICD-10-CM

## 2023-08-21 DIAGNOSIS — E11.69 TYPE 2 DIABETES MELLITUS WITH OTHER SPECIFIED COMPLICATION, WITHOUT LONG-TERM CURRENT USE OF INSULIN (HCC): ICD-10-CM

## 2023-08-21 DIAGNOSIS — J45.20 MILD INTERMITTENT ASTHMA WITHOUT COMPLICATION: ICD-10-CM

## 2023-08-21 DIAGNOSIS — I10 ESSENTIAL HYPERTENSION: ICD-10-CM

## 2023-08-21 PROCEDURE — 88344 IMHCHEM/IMCYTCHM EA MLT ANTB: CPT | Performed by: PATHOLOGY

## 2023-08-21 PROCEDURE — G0416 PROSTATE BIOPSY, ANY MTHD: HCPCS | Performed by: PATHOLOGY

## 2023-08-21 RX ORDER — AMLODIPINE BESYLATE 5 MG/1
5 TABLET ORAL DAILY
Qty: 90 TABLET | Refills: 3 | Status: SHIPPED | OUTPATIENT
Start: 2023-08-21 | End: 2023-08-21

## 2023-08-21 RX ORDER — METFORMIN HYDROCHLORIDE 500 MG/1
TABLET, EXTENDED RELEASE ORAL
Qty: 180 TABLET | Refills: 1 | Status: SHIPPED | OUTPATIENT
Start: 2023-08-21 | End: 2023-08-21

## 2023-08-21 RX ORDER — AMLODIPINE BESYLATE 5 MG/1
5 TABLET ORAL DAILY
Qty: 90 TABLET | Refills: 3 | Status: SHIPPED | OUTPATIENT
Start: 2023-08-21

## 2023-08-21 RX ORDER — DULAGLUTIDE 0.75 MG/.5ML
0.75 INJECTION, SOLUTION SUBCUTANEOUS WEEKLY
Qty: 6 ML | Refills: 3 | Status: SHIPPED | OUTPATIENT
Start: 2023-08-21

## 2023-08-21 RX ORDER — MONTELUKAST SODIUM 10 MG/1
10 TABLET ORAL
Qty: 90 TABLET | Refills: 0 | Status: SHIPPED | OUTPATIENT
Start: 2023-08-21 | End: 2023-08-21

## 2023-08-21 RX ORDER — MONTELUKAST SODIUM 10 MG/1
10 TABLET ORAL
Qty: 90 TABLET | Refills: 0 | Status: SHIPPED | OUTPATIENT
Start: 2023-08-21 | End: 2023-11-19

## 2023-08-21 RX ORDER — METOPROLOL SUCCINATE 25 MG/1
TABLET, EXTENDED RELEASE ORAL
Qty: 135 TABLET | Refills: 3 | Status: SHIPPED | OUTPATIENT
Start: 2023-08-21 | End: 2023-08-21

## 2023-08-21 RX ORDER — ATORVASTATIN CALCIUM 20 MG/1
20 TABLET, FILM COATED ORAL DAILY
Qty: 90 TABLET | Refills: 3 | Status: SHIPPED | OUTPATIENT
Start: 2023-08-21

## 2023-08-21 RX ORDER — METOPROLOL SUCCINATE 25 MG/1
TABLET, EXTENDED RELEASE ORAL
Qty: 135 TABLET | Refills: 3 | Status: SHIPPED | OUTPATIENT
Start: 2023-08-21

## 2023-08-21 RX ORDER — ATORVASTATIN CALCIUM 20 MG/1
20 TABLET, FILM COATED ORAL DAILY
Qty: 90 TABLET | Refills: 3 | Status: SHIPPED | OUTPATIENT
Start: 2023-08-21 | End: 2023-08-21

## 2023-08-21 RX ORDER — METFORMIN HYDROCHLORIDE 500 MG/1
TABLET, EXTENDED RELEASE ORAL
Qty: 180 TABLET | Refills: 1 | Status: SHIPPED | OUTPATIENT
Start: 2023-08-21

## 2023-08-21 RX ORDER — DULAGLUTIDE 0.75 MG/.5ML
0.75 INJECTION, SOLUTION SUBCUTANEOUS WEEKLY
Qty: 6 ML | Refills: 3 | Status: SHIPPED | OUTPATIENT
Start: 2023-08-21 | End: 2023-08-21

## 2023-08-21 RX ORDER — LISINOPRIL 40 MG/1
40 TABLET ORAL DAILY
Qty: 90 TABLET | Refills: 0 | Status: SHIPPED | OUTPATIENT
Start: 2023-08-21 | End: 2023-08-21

## 2023-08-21 RX ORDER — LISINOPRIL 40 MG/1
40 TABLET ORAL DAILY
Qty: 90 TABLET | Refills: 0 | Status: SHIPPED | OUTPATIENT
Start: 2023-08-21 | End: 2023-11-19

## 2023-08-21 NOTE — TELEPHONE ENCOUNTER
Attempted to call patient, line busy. Earlier today we sent in prescriptions to [facility]. Pt called back and states it is the wrong one. It was the one that was on his chart previously so we need to get contact number.

## 2023-08-22 ENCOUNTER — TELEPHONE (OUTPATIENT)
Dept: FAMILY MEDICINE CLINIC | Facility: CLINIC | Age: 63
End: 2023-08-22

## 2023-08-22 NOTE — TELEPHONE ENCOUNTER
Message on outlook. Called insurance rep provided by patient. Had to leave a message. [facility], patient stated this is not correct. So Terence resent them to the [address]. He is now saying that is incorrect. We need to speak to insurance so this can be addressed. My birth date is [birth year]. I'm calling for Sandrine and I really need her to call to get my prescriptions straightened out with my online pharmacy. I need her to call Bj at [phone]. Every time you send my prescriptions to [facility], it goes to a different [facility] in [place] and not the one in [place] or in [facility]. So need this, I need someone to call this phone number and discuss with her the five prescriptions that need to be updated. OK, thank you. If any questions, call me at [phone].

## 2023-08-22 NOTE — TELEPHONE ENCOUNTER
LUISA for Micheal to call office with contact info. Message received on outlook that we sent the meds to the wrong [facility]. Asked if he gets the VM to leave contact number of pharmacy.

## 2023-08-22 NOTE — TELEPHONE ENCOUNTER
Representative from [facility] called with a fax number to send patient's prescriptions to     Luc Steel [phone]    Fax [phone]

## 2023-08-23 NOTE — TELEPHONE ENCOUNTER
LM for rep to call again. The fax is not associated with any pharmacy in the system. We need to verify all [place] pharmacies in the system by address. Clinical needs to speak with them when they call. We need to try again too because the patient needs his meds soon.

## 2023-08-24 NOTE — TELEPHONE ENCOUNTER
Upon review of the In Basket request and the patient's chart, initial outreach has been made via fax to facility. Please see Contacts section for details.      Thank you  Yordy Doran

## 2023-08-28 NOTE — PROGRESS NOTES
Referring Physician: Kcay Henderson MD  A copy of this note was sent to the referring physician. Diagnoses and all orders for this visit:    Prostate cancer ([address])  -     PSA Total, Diagnostic; Future  -     PSA Total, Diagnostic            Assessment and plan:       1. Prostate cancer on active surveillance  -cT1c  -Date of original diagnosis: 22  -grade group 1 (Yeni 3+3=6 disease, 4 of 12 total positive cores-left medial base, left medial base, right lateral apex, right medial apex). Maximum core positivity: 10%  - pre treatment PSA:  5.0  - Multiparametric MRI: 1.2 cm lesion in the left base corresponding to site of prior biopsy, no extracapsular extension seminal vesicle invasion  -Repeat biopsy (transperineal fusion, August 2023) since 6 disease, lesion present from the left base shows BPH only, 6 total positive cores of Yeni 6 disease, maximum core positivity: 15%    At this point in time there are no clear oncologic indications to transition from active surveillance to definitive local management. This was discussed in detail that it is safe to continue to observe his prostate cancer. Jillian Cooler is comfortable with continued active surveillance at this time. We did discuss other options as well. This point we will follow primarily based on PSA. Of note the lesion documented on his MRI (1.3 cm left base posterior lesion) did proved to be a BPH nodule on his recent biopsy. I would hold off on repeat tissue sampling for at least 18 months if not 2 years pending his PSA and likely repeat imaging.     He will follow-up at the [facility] office with her advanced practitioner team in 6 months with a repeat [address]      Chief Complaint      Surveillance follow-up      History of Present Illness     Valeria Villa is a 60 y.o. male returns in follow-up for Yeni 6 prostate cancer on active surveillance    Detailed Urologic History     - please refer to HPI    Review of Systems     Review of Systems   Constitutional: Negative for activity change and fatigue. HENT: Negative for congestion. Eyes: Negative for visual disturbance. Respiratory: Negative for shortness of breath and wheezing. Cardiovascular: Negative for chest pain and leg swelling. Gastrointestinal: Negative for abdominal pain. Endocrine: Negative for polyuria. Genitourinary: Negative for dysuria, flank pain, hematuria and urgency. Musculoskeletal: Negative for back pain. Allergic/Immunologic: Negative for immunocompromised state. Neurological: Negative for dizziness and numbness. Psychiatric/Behavioral: Negative for dysphoric mood. All other systems reviewed and are negative. Allergies     No Known Allergies    Physical Exam       Physical Exam  Constitutional:       General: He is not in acute distress. Appearance: He is well-developed. HENT:      Head: Normocephalic and atraumatic. Cardiovascular:      Comments: Negative lower extremity edema  Pulmonary:      Effort: Pulmonary effort is normal.      Breath sounds: Normal breath sounds. Abdominal:      Palpations: Abdomen is soft. Musculoskeletal:         General: Normal range of motion. Cervical back: Normal range of motion. Skin:     General: Skin is warm. Neurological:      Mental Status: He is alert and oriented to person, place, and time.    Psychiatric:         Behavior: Behavior normal.           Vital Signs  Vitals:    08/30/23 1012   BP: 140/82   BP Location: Left arm   Patient Position: Sitting   Cuff Size: Adult   Weight: 94.8 kg (209 lb)   Height: 5' 9" (1.753 m)         Current Medications       Current Outpatient Medications:   •  amLODIPine (NORVASC) 5 mg tablet, Take 1 tablet (5 mg total) by mouth daily, Disp: 90 tablet, Rfl: 3  •  aspirin (ECOTRIN LOW STRENGTH) 81 mg EC tablet, Take 81 mg by mouth daily, Disp: , Rfl:   •  atorvastatin (LIPITOR) 20 mg tablet, Take 1 tablet (20 mg total) by mouth daily, Disp: 90 tablet, Rfl: 3  •  clobetasol (TEMOVATE) 0.05 % cream, Apply topically 2 (two) times a day, Disp: 30 g, Rfl: 1  •  dulaglutide (Trulicity) 3.75 ZH/9.9PM injection, Inject 0.5 mL (0.75 mg total) under the skin once a week, Disp: 6 mL, Rfl: 3  •  Easy Touch FlipLock Needles 25G X 1" MISC, USE IN THE MORNING, Disp: 100 each, Rfl: 0  •  glucose blood test strip, Test once daily or as directed, Disp: 100 each, Rfl: 2  •  Lancets (onetouch ultrasoft) lancets, Test once daily or as directed, Disp: 100 each, Rfl: 3  •  lisinopril (ZESTRIL) 40 mg tablet, Take 1 tablet (40 mg total) by mouth daily, Disp: 90 tablet, Rfl: 0  •  metFORMIN (GLUCOPHAGE-XR) 500 mg 24 hr tablet, 1 tab po bid, Disp: 180 tablet, Rfl: 1  •  metoprolol succinate (TOPROL-XL) 25 mg 24 hr tablet, TAKE 1 & 1/2 (ONE & ONE-HALF) TABLETS BY MOUTH AT BEDTIME, Disp: 135 tablet, Rfl: 3  •  montelukast (SINGULAIR) 10 mg tablet, Take 1 tablet (10 mg total) by mouth daily at bedtime, Disp: 90 tablet, Rfl: 0  •  omega-3-acid ethyl esters (LOVAZA) 1 g capsule, Take 1 capsule (1 g total) by mouth 2 (two) times a day, Disp: 180 capsule, Rfl: 0  •  sildenafil (REVATIO) 20 mg tablet, TAKE 2 TABLETS BY MOUTH ONCE DAILY 1  HOUR  BEFORE  INTERCOURSE., Disp: 30 tablet, Rfl: 0      Active Problems     Patient Active Problem List   Diagnosis   • Type 2 diabetes mellitus with other specified complication, without long-term current use of insulin (HCC)   • Asthma   • Essential hypertension   • Herpes zoster without complication   • Prostate cancer screening   • Pre-operative cardiovascular examination   • Other specified glaucoma   • Hypertensive heart disease without heart failure   • Right elbow pain   • Chest pressure   • Mixed hyperlipidemia   • Family history of early CAD   • Claudication [facility])   • Prostate cancer ([address])   • Neuropathy         Past Medical History     Past Medical History:   Diagnosis Date   • Asthma    • Benign prostatic hyperplasia    • Chronic kidney disease    • Diabetes mellitus (HCC)    • Erectile dysfunction    • Family history of prostate cancer    • Hypertension    • Kidney stone    • Mixed hyperlipidemia    • Prostate cancer [facility])          Surgical History     Past Surgical History:   Procedure Laterality Date   • CHOLECYSTECTOMY     • NV BX PROSTATE STRTCTC SATURATION SAMPLING IMG GID N/A 8/17/2023    Procedure: TRANSPERINEAL MRI FUSION BIOPSY PROSTATE;  Surgeon: Fred Mathis MD;  Location: [facility] MAIN OR;  Service: Urology   • PROSTATE BIOPSY     • ROTATOR CUFF REPAIR     • SHOULDER SURGERY  2015   • VASECTOMY  1994         Family History     Family History   Problem Relation Age of Onset   • Hypertension Mother    • Diabetes Mother    • Cancer Father    • Hypertension Father    • Prostate cancer Father    • Hypertension Brother          Social History     Social History     Social History     Tobacco Use   Smoking Status Never   • Passive exposure: Past   Smokeless Tobacco Never   Tobacco Comments    I dont smoke         Pertinent Lab Values     Lab Results   Component Value Date    CREATININE 1.11 08/12/2023       Lab Results   Component Value Date    PSA 3.1 11/30/2022    PSA 5.0 (H) 06/17/2022    PSA 3.6 11/26/2021       @RESULTRCNT(1H])@      Pertinent Imaging          FINDINGS:     PROSTATE:     Size: 4.1 x 5.1 x 6.4 cm = 69.6 cc. Post-biopsy hemorrhage:  Small amount. Central gland enlargement (BPH): Moderate with median lobe hypertrophy.     Focal lesions - localization as follows:     Lesion: 1       Size: 1.2 x 0.9 x 1.3 cm, series 5 image 12, series 3 image 15. Location: Left base posterior transition zone       T2-weighted images: Score 4: Lenticular or noncircumscribed, homogeneous, moderately hypointense, and less than 1.5 cm in greatest dimension.        Diffusion-weighted images: Score 4: Focal markedly hypointense on ADC and markedly hyperintense on high b-value DWI; less than 1.5 cm in greatest dimension. Dynamic post-contrast images: (+) Focal, earlier or contemporaneous with, enhancement of adjacent normal prostatic tissues; corresponds to a finding on T2-weighted and/or DWI. PI-RADS Assessment Category: 4, High (clinically significant cancer is likely to be present). Extra-prostatic extension (EPE): Does not abut capsule.     SEMINAL VESICLES: Unremarkable     Note: Clinically significant cancer is defined on pathology/histology as Yeni score greater than or equal to 7, and/or volume of greater than or equal to 0.5 mL, and/or extraprostatic extension.     URINARY BLADDER: Partially distended.     LYMPH NODES: No pelvic lymphadenopathy.     BONES: No suspicious osseous lesion.         IMPRESSION:     1. PI-RADSv2.1 Category 4 -High (clinically significant cancer is likely to be present). Index lesion #1 in the left base posterior transition zone measuring up to 1.3 cm.     2. No extraprostatic tumor, seminal vesicle invasion, pelvic lymphadenopathy, or pelvic osseous metastatic disease.     3. Calculated prostate volume of 69.6 cc.       Portions of the record may have been created with voice recognition software. Occasional wrong word or "sound a like" substitutions may have occurred due to the inherent limitations of voice recognition software. Read the chart carefully and recognize, using context, where substitutions have occurred.

## 2023-08-30 ENCOUNTER — OFFICE VISIT (OUTPATIENT)
Dept: UROLOGY | Facility: CLINIC | Age: 63
End: 2023-08-30
Payer: COMMERCIAL

## 2023-08-30 VITALS
DIASTOLIC BLOOD PRESSURE: 82 MMHG | BODY MASS INDEX: 30.96 KG/M2 | SYSTOLIC BLOOD PRESSURE: 140 MMHG | HEIGHT: 69 IN | WEIGHT: 209 LBS

## 2023-08-30 DIAGNOSIS — C61 PROSTATE CANCER (HCC): Primary | ICD-10-CM

## 2023-08-30 PROCEDURE — 99214 OFFICE O/P EST MOD 30 MIN: CPT | Performed by: UROLOGY

## 2023-08-30 NOTE — TELEPHONE ENCOUNTER
Upon review of the In Basket request we were able to locate, review, and update the patient chart as requested for Diabetic Eye Exam.    Any additional questions or concerns should be emailed to the Practice Liaisons via the appropriate education email address, please do not reply via In Basket.     Thank you  Wellington Hernandez

## 2023-10-25 DIAGNOSIS — E11.69 TYPE 2 DIABETES MELLITUS WITH OTHER SPECIFIED COMPLICATION, WITHOUT LONG-TERM CURRENT USE OF INSULIN (HCC): ICD-10-CM

## 2023-10-25 RX ORDER — OMEGA-3-ACID ETHYL ESTERS 1 G/1
1 CAPSULE, LIQUID FILLED ORAL 2 TIMES DAILY
Qty: 180 CAPSULE | Refills: 0 | Status: SHIPPED | OUTPATIENT
Start: 2023-10-25

## 2023-11-28 ENCOUNTER — TELEPHONE (OUTPATIENT)
Age: 63
End: 2023-11-28

## 2023-11-28 NOTE — TELEPHONE ENCOUNTER
Patient called uses [facility] pharmacy they are requesting for his progress note from last visit and future visit. In order to receive his medications. Patient provided fax no. [phone]. Thanks.

## 2023-12-14 NOTE — TELEPHONE ENCOUNTER
7 day supply sent to pharmacy, regular prescription refill forwarded to provider though [facility] for review  Reason for Disposition   [1] Caller requesting a prescription renewal (no refills left), no triage required, AND [2] triager able to renew prescription per department policy    Answer Assessment - Initial Assessment Questions  1  NAME of MEDICATION: "What medicine are you calling about?"      Metformin  2  QUESTION: "What is your question?" (e g , medication refill, side effect)      "I'm out of  Medication"  3  PRESCRIBING HCP: "Who prescribed it?" Reason: if prescribed by specialist, call should be referred to that group        PCP    Protocols used: MEDICATION QUESTION CALL-ADULT-
(0) No visual loss

## 2024-01-15 DIAGNOSIS — I10 ESSENTIAL HYPERTENSION: ICD-10-CM

## 2024-01-15 DIAGNOSIS — E11.69 TYPE 2 DIABETES MELLITUS WITH OTHER SPECIFIED COMPLICATION, WITHOUT LONG-TERM CURRENT USE OF INSULIN (HCC): ICD-10-CM

## 2024-01-15 RX ORDER — OMEGA-3-ACID ETHYL ESTERS 1 G/1
1 CAPSULE, LIQUID FILLED ORAL 2 TIMES DAILY
Qty: 180 CAPSULE | Refills: 1 | Status: SHIPPED | OUTPATIENT
Start: 2024-01-15

## 2024-01-15 RX ORDER — SILDENAFIL CITRATE 20 MG/1
TABLET ORAL
Qty: 60 TABLET | Refills: 1 | Status: SHIPPED | OUTPATIENT
Start: 2024-01-15

## 2024-01-15 NOTE — TELEPHONE ENCOUNTER
Reason for call:   [x] Refill   [] Prior Auth  [] Other:     Office:   [x] PCP/Provider -   [] Specialty/Provider -     Medication: omega 3 1g two times daily  Sildenafil 20 mg 2 tablets prn    Quantity: 90    Pharmacy: [facility] [address] [place] [address] RICARDO BLACK [phone]     Does the patient have enough for 3 days?   [x] Yes   [] No - Send as HP to POD

## 2024-02-09 DIAGNOSIS — E11.69 TYPE 2 DIABETES MELLITUS WITH OTHER SPECIFIED COMPLICATION, WITHOUT LONG-TERM CURRENT USE OF INSULIN (HCC): ICD-10-CM

## 2024-02-09 DIAGNOSIS — J45.20 MILD INTERMITTENT ASTHMA WITHOUT COMPLICATION: ICD-10-CM

## 2024-02-09 DIAGNOSIS — I10 ESSENTIAL HYPERTENSION: ICD-10-CM

## 2024-02-09 RX ORDER — LISINOPRIL 40 MG/1
40 TABLET ORAL DAILY
Qty: 90 TABLET | Refills: 0 | Status: SHIPPED | OUTPATIENT
Start: 2024-02-09 | End: 2024-05-09

## 2024-02-09 RX ORDER — MONTELUKAST SODIUM 10 MG/1
10 TABLET ORAL
Qty: 90 TABLET | Refills: 0 | Status: SHIPPED | OUTPATIENT
Start: 2024-02-09 | End: 2024-05-09

## 2024-02-09 RX ORDER — METFORMIN HYDROCHLORIDE 500 MG/1
TABLET, EXTENDED RELEASE ORAL
Qty: 180 TABLET | Refills: 1 | Status: SHIPPED | OUTPATIENT
Start: 2024-02-09

## 2024-02-21 PROBLEM — Z12.5 PROSTATE CANCER SCREENING: Status: RESOLVED | Noted: 2019-03-05 | Resolved: 2024-02-21

## 2024-02-22 LAB — PSA SERPL-MCNC: 3.2 NG/ML (ref 0–4)

## 2024-03-04 NOTE — PROGRESS NOTES
3/5/2024      Chief Complaint   Patient presents with    Follow-up     PSA     Assessment and Plan    Low risk prostate cancer   - cT1c  - grade group 1 ([place] 3+3=6 disease, 4 of 12 total positive cores-left medial base, left medial base, right lateral apex, right medial apex).  Maximum core positivity: 10%  - pre treatment PSA:  5.0  - Repeat PSA: 3.1  - Multiparametric MRI: 1.2 cm lesion in the left base corresponding to site of prior biopsy, no extracapsular extension seminal vesicle invasion  -Repeat biopsy (transperineal fusion, August 2023)- [place] 6 disease, lesion present from the left base shows BPH only, 6 total positive cores of [place] 6 disease, maximum core positivity: 15%  - PSA from 2/21/24 stable at 3.2  - Continue to monitor with repeat PSA in 6 months and ELENA. Consider repeat tissue sampling in 12-18 months pending his PSA and likely repeat imaging.     History of Present Illness  Alvaro Steel is a 63 y.o. male here for follow up evaluation of low risk prostate cancer. He is doing well and has no complaints.         Review of Systems   Constitutional:  Negative for chills and fever.   Respiratory:  Negative for shortness of breath.    Cardiovascular:  Negative for chest pain.   Gastrointestinal:  Negative for abdominal pain.   Genitourinary:  Negative for difficulty urinating, dysuria, flank pain, frequency, hematuria and urgency.   Neurological:  Negative for dizziness.                  Past Medical History  Past Medical History:   Diagnosis Date    Asthma     Benign prostatic hyperplasia     Chronic kidney disease     Diabetes mellitus (HCC)     Erectile dysfunction     Family history of prostate cancer     Hypertension     Kidney stone     Mixed hyperlipidemia     Prostate cancer (HCC)        Past Social History  Past Surgical History:   Procedure Laterality Date    CHOLECYSTECTOMY      ME BX PROSTATE STRTCTC SATURATION SAMPLING IMG GID N/A 8/17/2023    Procedure: TRANSPERINEAL MRI  FUSION BIOPSY PROSTATE;  Surgeon: Destin Ambrose MD;  Location: [facility] MAIN OR;  Service: Urology    PROSTATE BIOPSY      ROTATOR CUFF REPAIR      SHOULDER SURGERY  2015    VASECTOMY  1994     Social History     Tobacco Use   Smoking Status Never    Passive exposure: Past   Smokeless Tobacco Never   Tobacco Comments    I dont smoke       Past Family History  Family History   Problem Relation Age of Onset    Hypertension Mother     Diabetes Mother     Cancer Father     Hypertension Father     Prostate cancer Father     Hypertension Brother        Past Social history  Social History     Socioeconomic History    Marital status: /Civil Union     Spouse name: Not on file    Number of children: Not on file    Years of education: Not on file    Highest education level: Not on file   Occupational History    Not on file   Tobacco Use    Smoking status: Never     Passive exposure: Past    Smokeless tobacco: Never    Tobacco comments:     I dont smoke   Vaping Use    Vaping status: Never Used   Substance and Sexual Activity    Alcohol use: Yes     Alcohol/week: 1.0 standard drink of alcohol     Types: 1 Standard drinks or equivalent per week     Comment: sOCIAL    Drug use: Yes     Frequency: 1.0 times per week     Types: Marijuana     Comment: Vape pen    Sexual activity: Yes     Partners: Female     Birth control/protection: None   Other Topics Concern    Not on file   Social History Narrative    Not on file     Social Determinants of Health     Financial Resource Strain: Not on file   Food Insecurity: Not on file   Transportation Needs: Not on file   Physical Activity: Inactive (10/5/2020)    Exercise Vital Sign     Days of Exercise per Week: 0 days     Minutes of Exercise per Session: 0 min   Stress: No Stress Concern Present (11/1/2022)    [employer] [place] [employer] - Occupational Stress Questionnaire     Feeling of Stress : Only a little   Social Connections: Not on file   Intimate Partner  "Violence: Not on file   Housing Stability: Not on file       Current Medications  Current Outpatient Medications   Medication Sig Dispense Refill    amLODIPine (NORVASC) 5 mg tablet Take 1 tablet (5 mg total) by mouth daily 90 tablet 3    atorvastatin (LIPITOR) 20 mg tablet Take 1 tablet (20 mg total) by mouth daily 90 tablet 3    clobetasol (TEMOVATE) 0.05 % cream Apply topically 2 (two) times a day 30 g 1    dulaglutide (Trulicity) 0.75 MG/0.5ML injection Inject 0.5 mL (0.75 mg total) under the skin once a week 6 mL 3    Easy Touch FlipLock Needles 25G X 1\" MISC USE IN THE MORNING 100 each 0    glucose blood test strip Test once daily or as directed 100 each 2    Lancets (onetouch ultrasoft) lancets Test once daily or as directed 100 each 3    lisinopril (ZESTRIL) 40 mg tablet Take 1 tablet (40 mg total) by mouth daily 90 tablet 0    metFORMIN (GLUCOPHAGE-XR) 500 mg 24 hr tablet 1 tab po bid 180 tablet 1    metoprolol succinate (TOPROL-XL) 25 mg 24 hr tablet TAKE 1 & 1/2 (ONE & ONE-HALF) TABLETS BY MOUTH AT BEDTIME 135 tablet 3    omega-3-acid ethyl esters (LOVAZA) 1 g capsule Take 1 capsule (1 g total) by mouth 2 (two) times a day 180 capsule 1    sildenafil (REVATIO) 20 mg tablet TAKE 2 TABLETS BY MOUTH ONCE DAILY 1  HOUR  BEFORE  INTERCOURSE. 60 tablet 1    aspirin (ECOTRIN LOW STRENGTH) 81 mg EC tablet Take 81 mg by mouth daily      montelukast (SINGULAIR) 10 mg tablet Take 1 tablet (10 mg total) by mouth daily at bedtime 90 tablet 0     No current facility-administered medications for this visit.       Allergies  No Known Allergies      The following portions of the patient's history were reviewed and updated as appropriate: allergies, current medications, past medical history, past social history, past surgical history and problem list.      Vitals  Vitals:    03/05/24 1129   BP: 145/87   BP Location: Left arm   Patient Position: Sitting   Cuff Size: Standard   Pulse: 58   Temp: (!) 97.3 °F (36.3 °C) " "  TempSrc: Temporal   SpO2: 96%   Weight: 99.3 kg (219 lb)   Height: 5' 9\" (1.753 m)           Physical Exam  Physical Exam  Constitutional:       Appearance: Normal appearance.   HENT:      Head: Normocephalic and atraumatic.      Right Ear: External ear normal.      Left Ear: External ear normal.      Nose: Nose normal.   Eyes:      General: No scleral icterus.     Conjunctiva/sclera: Conjunctivae normal.   Cardiovascular:      Pulses: Normal pulses.   Pulmonary:      Effort: Pulmonary effort is normal.   Musculoskeletal:         General: Normal range of motion.      Cervical back: Normal range of motion.   Neurological:      General: No focal deficit present.      Mental Status: He is alert and oriented to person, place, and time.   Psychiatric:         Mood and Affect: Mood normal.         Behavior: Behavior normal.         Thought Content: Thought content normal.         Judgment: Judgment normal.           Results  No results found for this or any previous visit (from the past 1 hour(s)).]  Lab Results   Component Value Date    PSA 3.2 02/21/2024    PSA 3.1 11/30/2022    PSA 5.0 (H) 06/17/2022     Lab Results   Component Value Date    CALCIUM 8.8 03/10/2019    K 4.8 08/12/2023    CO2 23 08/12/2023     08/12/2023    BUN 22 08/12/2023    CREATININE 1.11 08/12/2023     Lab Results   Component Value Date    WBC 7.2 08/12/2023    HGB 13.8 08/12/2023    HCT 39.9 08/12/2023    MCV 91 08/12/2023     08/12/2023           Orders  No orders of the defined types were placed in this encounter.      Cynthia Moss      "

## 2024-03-05 ENCOUNTER — OFFICE VISIT (OUTPATIENT)
Dept: UROLOGY | Facility: CLINIC | Age: 64
End: 2024-03-05
Payer: COMMERCIAL

## 2024-03-05 VITALS
SYSTOLIC BLOOD PRESSURE: 145 MMHG | HEIGHT: 69 IN | BODY MASS INDEX: 32.44 KG/M2 | DIASTOLIC BLOOD PRESSURE: 87 MMHG | TEMPERATURE: 97.3 F | HEART RATE: 58 BPM | WEIGHT: 219 LBS | OXYGEN SATURATION: 96 %

## 2024-03-05 DIAGNOSIS — C61 PROSTATE CANCER (HCC): Primary | ICD-10-CM

## 2024-03-05 PROCEDURE — 99213 OFFICE O/P EST LOW 20 MIN: CPT | Performed by: PHYSICIAN ASSISTANT

## 2024-03-06 ENCOUNTER — OFFICE VISIT (OUTPATIENT)
Dept: FAMILY MEDICINE CLINIC | Facility: CLINIC | Age: 64
End: 2024-03-06
Payer: COMMERCIAL

## 2024-03-06 VITALS
TEMPERATURE: 97.9 F | OXYGEN SATURATION: 98 % | BODY MASS INDEX: 32.56 KG/M2 | HEART RATE: 61 BPM | WEIGHT: 219.8 LBS | HEIGHT: 69 IN | SYSTOLIC BLOOD PRESSURE: 148 MMHG | RESPIRATION RATE: 12 BRPM | DIASTOLIC BLOOD PRESSURE: 82 MMHG

## 2024-03-06 DIAGNOSIS — E11.69 TYPE 2 DIABETES MELLITUS WITH OTHER SPECIFIED COMPLICATION, WITHOUT LONG-TERM CURRENT USE OF INSULIN (HCC): Primary | ICD-10-CM

## 2024-03-06 DIAGNOSIS — I11.9 HYPERTENSIVE HEART DISEASE WITHOUT HEART FAILURE: ICD-10-CM

## 2024-03-06 DIAGNOSIS — I10 ESSENTIAL HYPERTENSION: ICD-10-CM

## 2024-03-06 LAB — SL AMB POCT HEMOGLOBIN AIC: 8.7 (ref ?–6.5)

## 2024-03-06 PROCEDURE — 83036 HEMOGLOBIN GLYCOSYLATED A1C: CPT | Performed by: FAMILY MEDICINE

## 2024-03-06 PROCEDURE — 99214 OFFICE O/P EST MOD 30 MIN: CPT | Performed by: FAMILY MEDICINE

## 2024-03-06 RX ORDER — LOSARTAN POTASSIUM 100 MG/1
100 TABLET ORAL DAILY
Qty: 30 TABLET | Refills: 5 | Status: SHIPPED | OUTPATIENT
Start: 2024-03-06

## 2024-03-06 NOTE — PROGRESS NOTES
Name: Alvaro Steel      : 1960      MRN: [id number]  Encounter Provider: Fidel Almanzar MD  Encounter Date: 3/6/2024   Encounter department: [facility]    Assessment & Plan     1. Type 2 diabetes mellitus with other specified complication, without long-term current use of insulin (HCC)  Assessment & Plan:    Lab Results   Component Value Date    HGBA1C 8.7 (A) 2024   I discussed with pt. Offerd to adjust treatment - pt would like to retry diet and weight loss. Will monitor home BG. Pt will f/u in 3m    Orders:  -     POCT hemoglobin A1c  -     Comprehensive metabolic panel; Future  -     Lipid panel; Future  -     CBC and differential; Future; Expected date: 2024    2. Essential hypertension  Assessment & Plan:  Suboptimal control. Change lisinopril to losartan 100mg qd. Recheck BP 3-4w    Orders:  -     losartan (COZAAR) 100 MG tablet; Take 1 tablet (100 mg total) by mouth daily  -     Comprehensive metabolic panel; Future  -     Lipid panel; Future  -     CBC and differential; Future; Expected date: 2024    3. Hypertensive heart disease without heart failure  Assessment & Plan:  Poor control. Change lisinopril to losartan 100mg qd.  Recheck BP in 3-4w             Subjective     f/u multiple med issues  - pt up to date with Uro.  Previous biopsy showed low risk [place] stage VI (3+3) prostate cancer.  He is presently under observation with urology.  He is having PSAs done to 6 months.  -Since last visit, patient fell off of his diet regarding his diabetes.  Patient checks his blood sugars at home occasionally.  Will last fasting sugar 3 days ago was 137.  A1C in office = 8.7  - pt noticed change in left vision approximately 2 weeks ago.  He knows a gray circular area in the left lower quadrant of his left eye.  He describes it as a shadow over his visual field rather than complete loss of vision.  He has a history of floaters but no documented  vitreous separation or retinal disease.  He has a appointment with his eye doctor in 2 to 3 weeks.  - Denies CP, palpitations, lightheadedness or other CV symptoms with or without exertion  - no new GI complaints due for colonoscopy later this year      Review of Systems   Constitutional: Negative.    HENT: Negative.     Eyes:  Positive for visual disturbance. Negative for pain, discharge and itching.   Respiratory: Negative.     Cardiovascular: Negative.    Gastrointestinal: Negative.    Genitourinary: Negative.    Musculoskeletal: Negative.    Neurological: Negative.    Psychiatric/Behavioral: Negative.     All other systems reviewed and are negative.      Past Medical History:   Diagnosis Date   • Asthma    • Benign prostatic hyperplasia    • Chronic kidney disease    • Diabetes mellitus (HCC)    • Erectile dysfunction    • Family history of prostate cancer    • Hypertension    • Kidney stone    • Mixed hyperlipidemia    • Prostate cancer (HCC)      Past Surgical History:   Procedure Laterality Date   • CHOLECYSTECTOMY     • NE BX PROSTATE STRTCTC SATURATION SAMPLING IMG GID N/A 8/17/2023    Procedure: TRANSPERINEAL MRI FUSION BIOPSY PROSTATE;  Surgeon: Destin Ambrose MD;  [facility]: [facility];  Service: Urology   • PROSTATE BIOPSY     • ROTATOR CUFF REPAIR     • SHOULDER SURGERY  2015   • VASECTOMY  1994     Family History   Problem Relation Age of Onset   • Hypertension Mother    • Diabetes Mother    • Cancer Father    • Hypertension Father    • Prostate cancer Father    • Hypertension Brother      Social History     Socioeconomic History   • Marital status: /Civil Union     Spouse name: Not on file   • Number of children: Not on file   • Years of education: Not on file   • Highest education level: Not on file   Occupational History   • Not on file   Tobacco Use   • Smoking status: Never     Passive exposure: Past   • Smokeless tobacco: Never   • Tobacco comments:     I dont smoke   Vaping Use  "  • Vaping status: Never Used   Substance and Sexual Activity   • Alcohol use: Yes     Alcohol/week: 1.0 standard drink of alcohol     Types: 1 Standard drinks or equivalent per week     Comment: sOCIAL   • Drug use: Yes     Frequency: 1.0 times per week     Types: Marijuana     Comment: Vape pen   • Sexual activity: Yes     Partners: Female     Birth control/protection: None   Other Topics Concern   • Not on file   Social History Narrative   • Not on file     Social Determinants of Health     Financial Resource Strain: Not on file   Food Insecurity: Not on file   Transportation Needs: Not on file   Physical Activity: Inactive (10/5/2020)    Exercise Vital Sign    • Days of Exercise per Week: 0 days    • Minutes of Exercise per Session: 0 min   Stress: No Stress Concern Present (11/1/2022)    [employer] [place] [employer] - Occupational Stress Questionnaire    • Feeling of Stress : Only a little   Social Connections: Not on file   Intimate Partner Violence: Not on file   Housing Stability: Not on file     Current Outpatient Medications on File Prior to Visit   Medication Sig   • amLODIPine (NORVASC) 5 mg tablet Take 1 tablet (5 mg total) by mouth daily   • aspirin (ECOTRIN LOW STRENGTH) 81 mg EC tablet Take 81 mg by mouth daily   • atorvastatin (LIPITOR) 20 mg tablet Take 1 tablet (20 mg total) by mouth daily   • clobetasol (TEMOVATE) 0.05 % cream Apply topically 2 (two) times a day   • dulaglutide (Trulicity) 0.75 MG/0.5ML injection Inject 0.5 mL (0.75 mg total) under the skin once a week   • Easy Touch FlipLock Needles 25G X 1\" MISC USE IN THE MORNING   • glucose blood test strip Test once daily or as directed   • Lancets (onetouch ultrasoft) lancets Test once daily or as directed   • metFORMIN (GLUCOPHAGE-XR) 500 mg 24 hr tablet 1 tab po bid   • metoprolol succinate (TOPROL-XL) 25 mg 24 hr tablet TAKE 1 & 1/2 (ONE & ONE-HALF) TABLETS BY MOUTH AT BEDTIME   • montelukast (SINGULAIR) 10 mg tablet Take 1 " "tablet (10 mg total) by mouth daily at bedtime   • omega-3-acid ethyl esters (LOVAZA) 1 g capsule Take 1 capsule (1 g total) by mouth 2 (two) times a day   • sildenafil (REVATIO) 20 mg tablet TAKE 2 TABLETS BY MOUTH ONCE DAILY 1  HOUR  BEFORE  INTERCOURSE.     No Known Allergies  Immunization History   Administered Date(s) Administered   • COVID-19 MODERNA VACC 0.5 ML IM 04/19/2021, 05/17/2021   • Tdap 10/05/2021       Objective     /82   Pulse 61   Temp 97.9 °F (36.6 °C)   Resp 12   Ht 5' 9\" (1.753 m)   Wt 99.7 kg (219 lb 12.8 oz)   SpO2 98%   BMI 32.46 kg/m²     Physical Exam  Vitals reviewed.   HENT:      Head: Normocephalic.      Right Ear: Tympanic membrane, ear canal and external ear normal.      Left Ear: Tympanic membrane, ear canal and external ear normal.      Nose: Nose normal.      Mouth/Throat:      Mouth: Mucous membranes are moist.   Eyes:      Extraocular Movements: Extraocular movements intact.      Conjunctiva/sclera: Conjunctivae normal.      Pupils: Pupils are equal, round, and reactive to light.   Neck:      Vascular: No carotid bruit.   Cardiovascular:      Rate and Rhythm: Normal rate and regular rhythm.      Pulses: Normal pulses.   Pulmonary:      Effort: Pulmonary effort is normal.   Abdominal:      General: Abdomen is flat. There is no distension.      Palpations: There is no mass.      Tenderness: There is no abdominal tenderness.   Musculoskeletal:         General: No swelling or tenderness.      Cervical back: No tenderness.      Right lower leg: No edema.      Left lower leg: No edema.   Lymphadenopathy:      Cervical: No cervical adenopathy.   Skin:     General: Skin is warm.      Capillary Refill: Capillary refill takes less than 2 seconds.   Neurological:      General: No focal deficit present.      Mental Status: He is alert and oriented to person, place, and time.   Psychiatric:         Mood and Affect: Mood normal.       Fidel Almanzar MD    "

## 2024-03-10 PROBLEM — R07.89 CHEST PRESSURE: Status: RESOLVED | Noted: 2019-10-07 | Resolved: 2024-03-10

## 2024-03-10 NOTE — ASSESSMENT & PLAN NOTE
Lab Results   Component Value Date    HGBA1C 8.7 (A) 03/06/2024   I discussed with pt. Offerd to adjust treatment - pt would like to retry diet and weight loss. Will monitor home BG. Pt will f/u in 3m

## 2024-04-11 ENCOUNTER — TELEPHONE (OUTPATIENT)
Dept: CARDIOLOGY CLINIC | Facility: CLINIC | Age: 64
End: 2024-04-11

## 2024-04-11 ENCOUNTER — VBI (OUTPATIENT)
Dept: ADMINISTRATIVE | Facility: OTHER | Age: 64
End: 2024-04-11

## 2024-04-11 LAB
LEFT EYE DIABETIC RETINOPATHY: NORMAL
RIGHT EYE DIABETIC RETINOPATHY: NORMAL

## 2024-04-11 NOTE — TELEPHONE ENCOUNTER
Deanne from [facility] called in today because the patient is having emergency surgery tomorrow and will need the papers that were sent over filled out and also requested that the patient's EKG from 7/2023 be faxed over to them as well.   Their office fax number is [phone]. Thank you! AT

## 2024-04-11 NOTE — TELEPHONE ENCOUNTER
Upon review of the In Basket request we were able to locate, review, and update the patient chart as requested for Diabetic Eye Exam.    Any additional questions or concerns should be emailed to the Practice Liaisons via the appropriate education email address, please do not reply via In Basket.    Thank you  Leigh Do

## 2024-04-12 NOTE — TELEPHONE ENCOUNTER
Pt needs clearance for surgery. The office did receive the forms that were sent over, but they are asking for an addendum or a new note stating that the patient is cleared from a cardiac standpoint.       Please advise. Deanne will fax over a clearance form as well. Please fax to [phone]    Pt is currently in the hospital waiting for surgery.

## 2024-04-12 NOTE — TELEPHONE ENCOUNTER
Spoke with Deanne at [facility].   Patient's surgery is on hold, until he physically sees a cardiologist.  Preoperative assessment needs to be completed within 30 days of surgery and patient was aware of this.  He will be given the choice to either follow up with Trina in the office or see a cardiologist at [facility] the day of surgery.   She will get back to us as soon as possible with the patient's decision.

## 2024-04-12 NOTE — TELEPHONE ENCOUNTER
Spoke to Deanne at [facility]- she states that the patients surgery IS CANCELLED at this time.     She is waiting to speak to the patient to see if this will be rescheduled or not and we will continue to be updated.

## 2024-04-15 ENCOUNTER — TELEPHONE (OUTPATIENT)
Dept: CARDIOLOGY CLINIC | Facility: CLINIC | Age: 64
End: 2024-04-15

## 2024-04-15 NOTE — TELEPHONE ENCOUNTER
Caller: Deanne     Doctor/Office: [facility]    Reason for Call: Deanne is going to be Faxing over a PRE OP form for this patient, which he is being seen Tomorrow for a pre op clearance     #: [phone]

## 2024-04-16 ENCOUNTER — OFFICE VISIT (OUTPATIENT)
Dept: CARDIOLOGY CLINIC | Facility: CLINIC | Age: 64
End: 2024-04-16
Payer: COMMERCIAL

## 2024-04-16 VITALS
WEIGHT: 209 LBS | HEIGHT: 69 IN | HEART RATE: 53 BPM | SYSTOLIC BLOOD PRESSURE: 130 MMHG | DIASTOLIC BLOOD PRESSURE: 80 MMHG | BODY MASS INDEX: 30.96 KG/M2

## 2024-04-16 DIAGNOSIS — Z01.810 PRE-OPERATIVE CARDIOVASCULAR EXAMINATION: Primary | ICD-10-CM

## 2024-04-16 DIAGNOSIS — E78.2 MIXED HYPERLIPIDEMIA: ICD-10-CM

## 2024-04-16 DIAGNOSIS — I10 ESSENTIAL HYPERTENSION: ICD-10-CM

## 2024-04-16 PROCEDURE — 99215 OFFICE O/P EST HI 40 MIN: CPT | Performed by: NURSE PRACTITIONER

## 2024-04-16 PROCEDURE — 93000 ELECTROCARDIOGRAM COMPLETE: CPT | Performed by: NURSE PRACTITIONER

## 2024-04-16 NOTE — PROGRESS NOTES
" Patient ID: Alvaro Steel is a 63 y.o. male.        Plan:      Mixed hyperlipidemia  Continue Lipitor 20 mg daily    Pre-operative cardiovascular examination  Stable to proceed with planned surgery    Essential hypertension  Blood pressure well-controlled  Continue amlodipine 5 mg daily, losartan 100 mg daily, metoprolol 25 mg daily       Follow up Plan/Summary Comments:  Alvaro is stable to proceed with his planned eye surgery.  There is no indication for additional cardiac testing at this time.  He may proceed at an acceptable risk.    Follow-up with cardiology on an as-needed basis.    HPI: Alvaro is seen in the office today for preop cardiovascular risk assessment.  He is planning for  Retinal surgery at [facility].    Alvaro is a pleasant 63-year-old male with cardiac history of hypertension, hyperlipidemia.    Alvaro is retired, but has been working on renovating a house recently.  He is up and down multiple flights of steps without any symptoms concerning for angina.  He denies any chest discomfort or exertional dyspnea.  He denies any symptoms concerning for heart failure such as orthopnea or nocturnal dyspnea or edema.  No dizziness, lightheadedness, syncope.    Review of Systems   10  point ROS  was otherwise non pertinent or negative except as per HPI or as below.   Gait: Normal      Most recent or relevant cardiac/vascular testing:    Stress echo 3/11/2019  Normal    Echo 3/11/2019  EF 55 to 65%    Holter monitor 3/14/2019  Sinus rhythm with minimal ectopic activity as detailed above.  Absence of symptoms during the study.     Results for orders placed or performed in visit on 04/16/24   POCT ECG    Impression    Sinus bradycardia, unchanged from prior           Objective:     /80   Pulse (!) 53   Ht 5' 9\" (1.753 m)   Wt 94.8 kg (209 lb)   BMI 30.86 kg/m²     PHYSICAL EXAM:    General:  Normal appearance, no acute distress  Eyes:  Anicteric.  Oral mucosa:  Moist.  Neck:  No " "JVD. Carotid upstrokes are brisk without bruits.  No masses.  Chest:  Clear to auscultation   Cardiac:  No palpable PMI.  Normal S1 and S2.  No murmur gallop or rub.  Abdomen:  Soft and nontender. No palpable organomegaly or aortic enlargement.  Extremities:  No peripheral edema.  Musculoskeletal:  Symmetric.   Vascular:  Pedal pulses are intact.  Neuro:  Grossly symmetric.  Psych:  Alert and oriented x3.    No Known Allergies    Current Outpatient Medications:     amLODIPine (NORVASC) 5 mg tablet, Take 1 tablet (5 mg total) by mouth daily, Disp: 90 tablet, Rfl: 3    aspirin (ECOTRIN LOW STRENGTH) 81 mg EC tablet, Take 81 mg by mouth daily, Disp: , Rfl:     atorvastatin (LIPITOR) 20 mg tablet, Take 1 tablet (20 mg total) by mouth daily, Disp: 90 tablet, Rfl: 3    clobetasol (TEMOVATE) 0.05 % cream, Apply topically 2 (two) times a day, Disp: 30 g, Rfl: 1    dulaglutide (Trulicity) 0.75 MG/0.5ML injection, Inject 0.5 mL (0.75 mg total) under the skin once a week, Disp: 6 mL, Rfl: 3    Easy Touch FlipLock Needles 25G X 1\" MISC, USE IN THE MORNING, Disp: 100 each, Rfl: 0    glucose blood test strip, Test once daily or as directed, Disp: 100 each, Rfl: 2    Lancets (onetouch ultrasoft) lancets, Test once daily or as directed, Disp: 100 each, Rfl: 3    losartan (COZAAR) 100 MG tablet, Take 1 tablet (100 mg total) by mouth daily, Disp: 30 tablet, Rfl: 5    metFORMIN (GLUCOPHAGE-XR) 500 mg 24 hr tablet, 1 tab po bid, Disp: 180 tablet, Rfl: 1    metoprolol succinate (TOPROL-XL) 25 mg 24 hr tablet, TAKE 1 & 1/2 (ONE & ONE-HALF) TABLETS BY MOUTH AT BEDTIME, Disp: 135 tablet, Rfl: 3    montelukast (SINGULAIR) 10 mg tablet, Take 1 tablet (10 mg total) by mouth daily at bedtime, Disp: 90 tablet, Rfl: 0    omega-3-acid ethyl esters (LOVAZA) 1 g capsule, Take 1 capsule (1 g total) by mouth 2 (two) times a day, Disp: 180 capsule, Rfl: 1    sildenafil (REVATIO) 20 mg tablet, TAKE 2 TABLETS BY MOUTH ONCE DAILY 1  HOUR  BEFORE  " INTERCOURSE., Disp: 60 tablet, Rfl: 1  Past Medical History:   Diagnosis Date    Asthma     Benign prostatic hyperplasia     Chronic kidney disease     Diabetes mellitus (HCC)     Erectile dysfunction     Family history of prostate cancer     Hypertension     Kidney stone     Mixed hyperlipidemia     Prostate cancer (HCC)      Past Surgical History:   Procedure Laterality Date    CHOLECYSTECTOMY      TX BX PROSTATE STRTCTC SATURATION SAMPLING IMG GID N/A 8/17/2023    Procedure: TRANSPERINEAL MRI FUSION BIOPSY PROSTATE;  Surgeon: Destin Ambrose MD;  Location: [facility];  Service: Urology    PROSTATE BIOPSY      ROTATOR CUFF REPAIR      SHOULDER SURGERY  2015    VASECTOMY  1994       CMP:   Lab Results   Component Value Date    K 4.8 08/12/2023     08/12/2023    CO2 23 08/12/2023    BUN 22 08/12/2023    CREATININE 1.11 08/12/2023    CREATININE 1.13 03/10/2019    EGFR 75 08/12/2023    EGFR 71 03/10/2019     Lipid Profile:    Lab Results   Component Value Date    TRIG 104 11/30/2022    HDL 32 (L) 11/30/2022         Social History     Tobacco Use   Smoking Status Never    Passive exposure: Past   Smokeless Tobacco Never   Tobacco Comments    I dont smoke

## 2024-04-16 NOTE — ASSESSMENT & PLAN NOTE
Blood pressure well-controlled  Continue amlodipine 5 mg daily, losartan 100 mg daily, metoprolol 25 mg daily

## 2024-05-03 ENCOUNTER — TELEPHONE (OUTPATIENT)
Age: 64
End: 2024-05-03

## 2024-05-03 NOTE — TELEPHONE ENCOUNTER
Call received from [facility] requesting recent office notes faxed to them  Prescription plan and patient gets medication from them at no cost      metFORMIN (GLUCOPHAGE-XR) 500 mg 24 hr tablet     montelukast (SINGULAIR) 10 mg tablet       lisinopril (ZESTRIL) 40 mg tablet       Fax to: Attn: Maryam  [phone]

## 2024-05-03 NOTE — TELEPHONE ENCOUNTER
When Maryam returns call please advise that we need a medical info release form before we can send any office notes out. She can fax to our office

## 2024-05-24 ENCOUNTER — TELEPHONE (OUTPATIENT)
Age: 64
End: 2024-05-24

## 2024-05-24 NOTE — TELEPHONE ENCOUNTER
Caller: Sandy/ [facility]     Doctor: Shante Calzada    Reason for call: Sandy called advising that zoila will be going in for another eye procedure on 6/7 and since he just saw Shante on 4/16, would like to know if patient could be cleared for procedure or needs to come in for a pre-op? Please advise and send CC to: [phone]    Call back#: [phone]

## 2024-05-25 LAB
ALBUMIN SERPL-MCNC: 4.3 G/DL (ref 3.9–4.9)
ALBUMIN/GLOB SERPL: 1.9 {RATIO} (ref 1.2–2.2)
ALP SERPL-CCNC: 67 IU/L (ref 44–121)
ALT SERPL-CCNC: 11 IU/L (ref 0–44)
AST SERPL-CCNC: 18 IU/L (ref 0–40)
BASOPHILS # BLD AUTO: 0 X10E3/UL (ref 0–0.2)
BASOPHILS NFR BLD AUTO: 0 %
BILIRUB SERPL-MCNC: 0.4 MG/DL (ref 0–1.2)
BUN SERPL-MCNC: 18 MG/DL (ref 8–27)
BUN/CREAT SERPL: 15 (ref 10–24)
CALCIUM SERPL-MCNC: 9.4 MG/DL (ref 8.6–10.2)
CHLORIDE SERPL-SCNC: 104 MMOL/L (ref 96–106)
CHOLEST SERPL-MCNC: 116 MG/DL (ref 100–199)
CO2 SERPL-SCNC: 25 MMOL/L (ref 20–29)
CREAT SERPL-MCNC: 1.2 MG/DL (ref 0.76–1.27)
EGFR: 68 ML/MIN/1.73
EOSINOPHIL # BLD AUTO: 0.6 X10E3/UL (ref 0–0.4)
EOSINOPHIL NFR BLD AUTO: 10 %
ERYTHROCYTE [DISTWIDTH] IN BLOOD BY AUTOMATED COUNT: 12.3 % (ref 11.6–15.4)
GLOBULIN SER-MCNC: 2.3 G/DL (ref 1.5–4.5)
GLUCOSE SERPL-MCNC: 125 MG/DL (ref 70–99)
HCT VFR BLD AUTO: 42.1 % (ref 37.5–51)
HDLC SERPL-MCNC: 36 MG/DL
HGB BLD-MCNC: 13.8 G/DL (ref 13–17.7)
IMM GRANULOCYTES # BLD: 0 X10E3/UL (ref 0–0.1)
IMM GRANULOCYTES NFR BLD: 0 %
LDLC SERPL CALC-MCNC: 61 MG/DL (ref 0–99)
LYMPHOCYTES # BLD AUTO: 1.9 X10E3/UL (ref 0.7–3.1)
LYMPHOCYTES NFR BLD AUTO: 30 %
MCH RBC QN AUTO: 30.2 PG (ref 26.6–33)
MCHC RBC AUTO-ENTMCNC: 32.8 G/DL (ref 31.5–35.7)
MCV RBC AUTO: 92 FL (ref 79–97)
MONOCYTES # BLD AUTO: 0.5 X10E3/UL (ref 0.1–0.9)
MONOCYTES NFR BLD AUTO: 8 %
NEUTROPHILS # BLD AUTO: 3.3 X10E3/UL (ref 1.4–7)
NEUTROPHILS NFR BLD AUTO: 52 %
PLATELET # BLD AUTO: 242 X10E3/UL (ref 150–450)
POTASSIUM SERPL-SCNC: 4.5 MMOL/L (ref 3.5–5.2)
PROT SERPL-MCNC: 6.6 G/DL (ref 6–8.5)
RBC # BLD AUTO: 4.57 X10E6/UL (ref 4.14–5.8)
SL AMB VLDL CHOLESTEROL CALC: 19 MG/DL (ref 5–40)
SODIUM SERPL-SCNC: 140 MMOL/L (ref 134–144)
TRIGL SERPL-MCNC: 97 MG/DL (ref 0–149)
WBC # BLD AUTO: 6.3 X10E3/UL (ref 3.4–10.8)

## 2024-05-31 ENCOUNTER — RA CDI HCC (OUTPATIENT)
Dept: OTHER | Facility: HOSPITAL | Age: 64
End: 2024-05-31

## 2024-05-31 NOTE — PROGRESS NOTES
HCC coding opportunities          Chart Reviewed number of suggestions sent to Provider: 3  E11.22  E11.65  E11.36     Patients Insurance        Commercial Insurance: [employer]

## 2024-06-06 RX ORDER — ERYTHROMYCIN 5 MG/G
OINTMENT OPHTHALMIC
COMMUNITY
Start: 2024-04-20

## 2024-06-06 RX ORDER — LISINOPRIL 40 MG/1
TABLET ORAL
COMMUNITY
Start: 2024-05-07 | End: 2024-06-07 | Stop reason: ALTCHOICE

## 2024-06-07 ENCOUNTER — OFFICE VISIT (OUTPATIENT)
Dept: FAMILY MEDICINE CLINIC | Facility: CLINIC | Age: 64
End: 2024-06-07
Payer: COMMERCIAL

## 2024-06-07 VITALS
WEIGHT: 204.6 LBS | DIASTOLIC BLOOD PRESSURE: 70 MMHG | BODY MASS INDEX: 30.3 KG/M2 | OXYGEN SATURATION: 98 % | HEIGHT: 69 IN | TEMPERATURE: 97.4 F | SYSTOLIC BLOOD PRESSURE: 112 MMHG | HEART RATE: 59 BPM

## 2024-06-07 DIAGNOSIS — E11.69 TYPE 2 DIABETES MELLITUS WITH OTHER SPECIFIED COMPLICATION, WITHOUT LONG-TERM CURRENT USE OF INSULIN (HCC): Primary | ICD-10-CM

## 2024-06-07 DIAGNOSIS — I10 ESSENTIAL HYPERTENSION: ICD-10-CM

## 2024-06-07 DIAGNOSIS — E78.2 MIXED HYPERLIPIDEMIA: ICD-10-CM

## 2024-06-07 DIAGNOSIS — C61 PROSTATE CANCER (HCC): ICD-10-CM

## 2024-06-07 PROCEDURE — 99214 OFFICE O/P EST MOD 30 MIN: CPT | Performed by: FAMILY MEDICINE

## 2024-06-07 NOTE — PROGRESS NOTES
"Ambulatory Visit  Name: Alvaro Steel      : 1960      MRN: [id number]  Encounter Provider: Fidel Almanzar MD  Encounter Date: 2024   Encounter department: [facility]    Assessment & Plan   1. Type 2 diabetes mellitus with other specified complication, without long-term current use of insulin (HCC)  Assessment & Plan:    Lab Results   Component Value Date    HGBA1C 6.9 (H) 2024   A1C stable on present care. Urged diet control.  Continue present care.  Recheck 6m  Orders:  -     Hemoglobin A1C; Future  2. Essential hypertension  Assessment & Plan:  Well controlled. Cont present treatment. Monitor labs. Recheck 6m    3. Mixed hyperlipidemia  Assessment & Plan:  Pt due for repeat labs. Urged diet compliance. Continue present care.  Recheck 6m  4. Prostate cancer (HCC)  Assessment & Plan:  Low risk by Uro assessment.  PSA unchanged. Pt due to f/u with Uro and repeat PSA in 6m         History of Present Illness     f/u multiple med issues  - pt is doing well  - pt continues to f/u with Uro re: prostate cA.  Uro states that his tumor has decreased in size, and that his PSA has improved. He gets his labs done every 6m, and is to see Uro in 18m  - pt has been workking on his diet. Due for repeat labs.  Home Bgs better - 110s fasting. Still gets occ high nonfasting tests when he \"snacks\"  - pt keeping active.  He denies CP, palpitations, lightheadedness or other CV symptoms with or without exertion  - pt denies any new GI complaints  - pt had retinal surgery 1m ago. Unfortunately has to have another procedure next week      Review of Systems   Constitutional: Negative.    HENT: Negative.     Eyes:  Positive for visual disturbance. Negative for redness.   Respiratory: Negative.     Cardiovascular: Negative.    Gastrointestinal: Negative.    Genitourinary: Negative.    Musculoskeletal: Negative.    Neurological: Negative.    Psychiatric/Behavioral: Negative.     All " "other systems reviewed and are negative.    Past Medical History:   Diagnosis Date   • Asthma    • Benign prostatic hyperplasia    • Chronic kidney disease    • Diabetes mellitus (HCC)    • Erectile dysfunction    • Family history of prostate cancer    • Hypertension    • Kidney stone    • Mixed hyperlipidemia    • Prostate cancer (HCC)      Past Surgical History:   Procedure Laterality Date   • CHOLECYSTECTOMY     • OR BX PROSTATE STRTCTC SATURATION SAMPLING IMG GID N/A 8/17/2023    Procedure: TRANSPERINEAL MRI FUSION BIOPSY PROSTATE;  Surgeon: Destin Ambrose MD;  Location[facility];  Service: Urology   • PROSTATE BIOPSY     • ROTATOR CUFF REPAIR     • SHOULDER SURGERY  2015   • VASECTOMY  1994     Family History   Problem Relation Age of Onset   • Hypertension Mother    • Diabetes Mother    • Cancer Father    • Hypertension Father    • Prostate cancer Father    • Hypertension Brother      Social History     Tobacco Use   • Smoking status: Never     Passive exposure: Past   • Smokeless tobacco: Never   • Tobacco comments:     I dont smoke   Vaping Use   • Vaping status: Some Days   Substance and Sexual Activity   • Alcohol use: Yes     Alcohol/week: 1.0 standard drink of alcohol     Types: 1 Standard drinks or equivalent per week     Comment: sOCIAL   • Drug use: Yes     Frequency: 1.0 times per week     Types: Marijuana     Comment: Vape pen   • Sexual activity: Yes     Partners: Female     Birth control/protection: None     Current Outpatient Medications on File Prior to Visit   Medication Sig   • amLODIPine (NORVASC) 5 mg tablet Take 1 tablet (5 mg total) by mouth daily   • aspirin (ECOTRIN LOW STRENGTH) 81 mg EC tablet Take 81 mg by mouth daily   • atorvastatin (LIPITOR) 20 mg tablet Take 1 tablet (20 mg total) by mouth daily   • dulaglutide (Trulicity) 0.75 MG/0.5ML injection Inject 0.5 mL (0.75 mg total) under the skin once a week   • Easy Touch FlipLock Needles 25G X 1\" MISC USE IN THE MORNING " "  • glucose blood test strip Test once daily or as directed   • Lancets (onetouch ultrasoft) lancets Test once daily or as directed   • losartan (COZAAR) 100 MG tablet Take 1 tablet (100 mg total) by mouth daily   • metFORMIN (GLUCOPHAGE-XR) 500 mg 24 hr tablet 1 tab po bid   • metoprolol succinate (TOPROL-XL) 25 mg 24 hr tablet TAKE 1 & 1/2 (ONE & ONE-HALF) TABLETS BY MOUTH AT BEDTIME   • montelukast (SINGULAIR) 10 mg tablet Take 1 tablet (10 mg total) by mouth daily at bedtime   • omega-3-acid ethyl esters (LOVAZA) 1 g capsule Take 1 capsule (1 g total) by mouth 2 (two) times a day   • sildenafil (REVATIO) 20 mg tablet TAKE 2 TABLETS BY MOUTH ONCE DAILY 1  HOUR  BEFORE  INTERCOURSE.   • erythromycin (ILOTYCIN) ophthalmic ointment APPLY A SMALL AMOUNT INTO LEFT EYE FOUR TIMES A DAY (Patient not taking: Reported on 6/7/2024)     No Known Allergies  Immunization History   Administered Date(s) Administered   • COVID-19 MODERNA VACC 0.5 ML IM 04/19/2021, 05/17/2021   • Tdap 10/05/2021     Objective     /70   Pulse 59   Temp (!) 97.4 °F (36.3 °C)   Ht 5' 9\" (1.753 m)   Wt 92.8 kg (204 lb 9.6 oz)   SpO2 98%   BMI 30.21 kg/m²     Physical Exam  Vitals reviewed.   HENT:      Head: Normocephalic.      Mouth/Throat:      Mouth: Mucous membranes are moist.   Eyes:      Extraocular Movements: Extraocular movements intact.      Conjunctiva/sclera: Conjunctivae normal.      Pupils: Pupils are equal, round, and reactive to light.   Cardiovascular:      Rate and Rhythm: Normal rate and regular rhythm.      Pulses: Normal pulses.   Pulmonary:      Effort: Pulmonary effort is normal.      Breath sounds: Normal breath sounds.   Abdominal:      General: There is no distension.      Palpations: There is no mass.      Tenderness: There is no abdominal tenderness.   Musculoskeletal:         General: No swelling, tenderness or deformity. Normal range of motion.      Cervical back: No tenderness.      Right lower leg: No edema. "      Left lower leg: No edema.   Lymphadenopathy:      Cervical: No cervical adenopathy.   Skin:     General: Skin is warm.      Capillary Refill: Capillary refill takes less than 2 seconds.   Neurological:      General: No focal deficit present.      Mental Status: He is alert and oriented to person, place, and time.      Cranial Nerves: No cranial nerve deficit.      Sensory: No sensory deficit.      Motor: No weakness.      Gait: Gait normal.   Psychiatric:         Mood and Affect: Mood normal.       Administrative Statements

## 2024-06-08 LAB — HBA1C MFR BLD: 6.9 % (ref 4.8–5.6)

## 2024-06-11 NOTE — ASSESSMENT & PLAN NOTE
Lab Results   Component Value Date    HGBA1C 6.9 (H) 06/07/2024   A1C stable on present care. Urged diet control.  Continue present care.  Recheck 6m

## 2024-06-13 ENCOUNTER — TELEPHONE (OUTPATIENT)
Age: 64
End: 2024-06-13

## 2024-06-13 NOTE — TELEPHONE ENCOUNTER
Leigh with [facility] is calling to have the patients last office notes faxed over Patient is in the pre-op area preparing for surgery now  Please fax to: [phone]

## 2024-07-23 ENCOUNTER — PATIENT MESSAGE (OUTPATIENT)
Dept: FAMILY MEDICINE CLINIC | Facility: CLINIC | Age: 64
End: 2024-07-23

## 2024-07-23 DIAGNOSIS — I10 ESSENTIAL HYPERTENSION: ICD-10-CM

## 2024-07-23 DIAGNOSIS — E11.69 TYPE 2 DIABETES MELLITUS WITH OTHER SPECIFIED COMPLICATION, WITHOUT LONG-TERM CURRENT USE OF INSULIN (HCC): ICD-10-CM

## 2024-07-23 DIAGNOSIS — J45.20 MILD INTERMITTENT ASTHMA WITHOUT COMPLICATION: ICD-10-CM

## 2024-07-23 RX ORDER — METFORMIN HYDROCHLORIDE 500 MG/1
TABLET, EXTENDED RELEASE ORAL
Qty: 180 TABLET | Refills: 0 | Status: SHIPPED | OUTPATIENT
Start: 2024-07-23 | End: 2024-07-24 | Stop reason: SDUPTHER

## 2024-07-23 RX ORDER — DULAGLUTIDE 0.75 MG/.5ML
0.75 INJECTION, SOLUTION SUBCUTANEOUS WEEKLY
Qty: 6 ML | Refills: 0 | Status: SHIPPED | OUTPATIENT
Start: 2024-07-23 | End: 2024-07-24 | Stop reason: DRUGHIGH

## 2024-07-23 RX ORDER — LOSARTAN POTASSIUM 100 MG/1
100 TABLET ORAL DAILY
Qty: 30 TABLET | Refills: 0 | Status: SHIPPED | OUTPATIENT
Start: 2024-07-23 | End: 2024-07-24 | Stop reason: SDUPTHER

## 2024-07-23 RX ORDER — AMLODIPINE BESYLATE 5 MG/1
5 TABLET ORAL DAILY
Qty: 90 TABLET | Refills: 0 | Status: SHIPPED | OUTPATIENT
Start: 2024-07-23 | End: 2024-07-24 | Stop reason: SDUPTHER

## 2024-07-23 RX ORDER — MONTELUKAST SODIUM 10 MG/1
10 TABLET ORAL
Qty: 90 TABLET | Refills: 0 | Status: SHIPPED | OUTPATIENT
Start: 2024-07-23 | End: 2024-07-24 | Stop reason: SDUPTHER

## 2024-07-23 RX ORDER — OMEGA-3-ACID ETHYL ESTERS 1 G/1
1 CAPSULE, LIQUID FILLED ORAL 2 TIMES DAILY
Qty: 180 CAPSULE | Refills: 0 | Status: SHIPPED | OUTPATIENT
Start: 2024-07-23 | End: 2024-07-24 | Stop reason: SDUPTHER

## 2024-07-24 ENCOUNTER — TELEPHONE (OUTPATIENT)
Age: 64
End: 2024-07-24

## 2024-07-24 DIAGNOSIS — E78.2 MIXED HYPERLIPIDEMIA: ICD-10-CM

## 2024-07-24 DIAGNOSIS — E11.69 TYPE 2 DIABETES MELLITUS WITH OTHER SPECIFIED COMPLICATION, WITHOUT LONG-TERM CURRENT USE OF INSULIN (HCC): ICD-10-CM

## 2024-07-24 DIAGNOSIS — I10 ESSENTIAL HYPERTENSION: ICD-10-CM

## 2024-07-24 DIAGNOSIS — J45.20 MILD INTERMITTENT ASTHMA WITHOUT COMPLICATION: ICD-10-CM

## 2024-07-24 RX ORDER — METOPROLOL SUCCINATE 25 MG/1
TABLET, EXTENDED RELEASE ORAL
Qty: 135 TABLET | Refills: 1 | Status: SHIPPED | OUTPATIENT
Start: 2024-07-24 | End: 2024-07-31 | Stop reason: SDUPTHER

## 2024-07-24 RX ORDER — ATORVASTATIN CALCIUM 20 MG/1
20 TABLET, FILM COATED ORAL DAILY
Qty: 100 TABLET | Refills: 1 | Status: SHIPPED | OUTPATIENT
Start: 2024-07-24 | End: 2024-07-31 | Stop reason: SDUPTHER

## 2024-07-24 RX ORDER — ATORVASTATIN CALCIUM 20 MG/1
20 TABLET, FILM COATED ORAL DAILY
Qty: 100 TABLET | Refills: 1 | Status: SHIPPED | OUTPATIENT
Start: 2024-07-24 | End: 2024-07-24 | Stop reason: SDUPTHER

## 2024-07-24 RX ORDER — AMLODIPINE BESYLATE 5 MG/1
5 TABLET ORAL DAILY
Qty: 100 TABLET | Refills: 1 | Status: SHIPPED | OUTPATIENT
Start: 2024-07-24 | End: 2024-07-31 | Stop reason: SDUPTHER

## 2024-07-24 RX ORDER — OMEGA-3-ACID ETHYL ESTERS 1 G/1
1 CAPSULE, LIQUID FILLED ORAL 2 TIMES DAILY
Qty: 200 CAPSULE | Refills: 1 | Status: SHIPPED | OUTPATIENT
Start: 2024-07-24 | End: 2024-07-31 | Stop reason: SDUPTHER

## 2024-07-24 RX ORDER — METFORMIN HYDROCHLORIDE 500 MG/1
TABLET, EXTENDED RELEASE ORAL
Qty: 200 TABLET | Refills: 1 | Status: SHIPPED | OUTPATIENT
Start: 2024-07-24 | End: 2024-07-31 | Stop reason: SDUPTHER

## 2024-07-24 RX ORDER — MONTELUKAST SODIUM 10 MG/1
10 TABLET ORAL
Qty: 100 TABLET | Refills: 1 | Status: SHIPPED | OUTPATIENT
Start: 2024-07-24 | End: 2024-07-31 | Stop reason: SDUPTHER

## 2024-07-24 RX ORDER — LOSARTAN POTASSIUM 100 MG/1
100 TABLET ORAL DAILY
Qty: 100 TABLET | Refills: 1 | Status: SHIPPED | OUTPATIENT
Start: 2024-07-24 | End: 2024-07-31 | Stop reason: SDUPTHER

## 2024-07-24 RX ORDER — ATORVASTATIN CALCIUM 20 MG/1
20 TABLET, FILM COATED ORAL DAILY
Qty: 100 TABLET | Refills: 1 | Status: SHIPPED | OUTPATIENT
Start: 2024-07-24

## 2024-07-24 NOTE — TELEPHONE ENCOUNTER
Patient called because all of his prescription should have been sent to [facility] - [place], [place] - [address] but he received a call stating it ended up being sent to the wrong one in [place] so he needs the pharmacy to be changed and refills resent. He also said he is out of the atorvastatin (LIPITOR) 20 mg tablet and the script that was sent today to the [facility] [address] [place] [address], [place] [address] can only be for 30 tablets because that is all his insurance covers when he has to  from the pharmacy. Please change quantity to 30 and resend script.Thank you.

## 2024-07-24 NOTE — TELEPHONE ENCOUNTER
Medication:     atorvastatin (LIPITOR) 20 mg tablet       Dose/Frequency: Take 1 tablet (20 mg total) by mouth daily     Quantity: 30    Pharmacy: [facility] [place] [facility] [address]     Office:   [x] PCP/Provider -   [] Speciality/Provider -     Does the patient have enough for 3 days?   [] Yes   [x] No - Send as HP to POD

## 2024-07-24 NOTE — TELEPHONE ENCOUNTER
Alvaro called in stated that all these need to be mail order to [facility] please. He will need them before his trip in a two weeks.

## 2024-07-24 NOTE — TELEPHONE ENCOUNTER
Alvaro stated that the his medication   should have been changed to a different dose and needs to be sent to the mail order service [facility]. It should be Trulicity 1.5mg as he is finishing up the previous dose.     dulaglutide (Trulicity) 0.75 MG/0.5ML injection

## 2024-07-31 DIAGNOSIS — E11.69 TYPE 2 DIABETES MELLITUS WITH OTHER SPECIFIED COMPLICATION, WITHOUT LONG-TERM CURRENT USE OF INSULIN (HCC): ICD-10-CM

## 2024-07-31 DIAGNOSIS — J45.20 MILD INTERMITTENT ASTHMA WITHOUT COMPLICATION: ICD-10-CM

## 2024-07-31 DIAGNOSIS — I10 ESSENTIAL HYPERTENSION: ICD-10-CM

## 2024-07-31 DIAGNOSIS — E78.2 MIXED HYPERLIPIDEMIA: ICD-10-CM

## 2024-07-31 RX ORDER — ATORVASTATIN CALCIUM 20 MG/1
20 TABLET, FILM COATED ORAL DAILY
Qty: 100 TABLET | Refills: 1 | Status: SHIPPED | OUTPATIENT
Start: 2024-07-31

## 2024-07-31 RX ORDER — METFORMIN HYDROCHLORIDE 500 MG/1
TABLET, EXTENDED RELEASE ORAL
Qty: 200 TABLET | Refills: 1 | Status: SHIPPED | OUTPATIENT
Start: 2024-07-31

## 2024-07-31 RX ORDER — METOPROLOL SUCCINATE 25 MG/1
TABLET, EXTENDED RELEASE ORAL
Qty: 135 TABLET | Refills: 1 | Status: SHIPPED | OUTPATIENT
Start: 2024-07-31

## 2024-07-31 RX ORDER — AMLODIPINE BESYLATE 5 MG/1
5 TABLET ORAL DAILY
Qty: 100 TABLET | Refills: 1 | Status: SHIPPED | OUTPATIENT
Start: 2024-07-31

## 2024-07-31 RX ORDER — LOSARTAN POTASSIUM 100 MG/1
100 TABLET ORAL DAILY
Qty: 100 TABLET | Refills: 1 | Status: SHIPPED | OUTPATIENT
Start: 2024-07-31

## 2024-07-31 RX ORDER — OMEGA-3-ACID ETHYL ESTERS 1 G/1
1 CAPSULE, LIQUID FILLED ORAL 2 TIMES DAILY
Qty: 200 CAPSULE | Refills: 1 | Status: SHIPPED | OUTPATIENT
Start: 2024-07-31

## 2024-07-31 RX ORDER — MONTELUKAST SODIUM 10 MG/1
10 TABLET ORAL
Qty: 100 TABLET | Refills: 1 | Status: SHIPPED | OUTPATIENT
Start: 2024-07-31 | End: 2025-02-16

## 2024-07-31 NOTE — TELEPHONE ENCOUNTER
:::NOT A DUPLICATE REQUEST:::    Patient calling stating that all of his medications, EXCLUDING his Trulicity, were all sent to the wrong [place] pharmacy.  All of his medications need to go to [facility] [address].    (Please take [place] [facility] out of patients chart)    Please resend below medications to correct pharmacy today as patient is almost out.  If received today his home delivery will be on time.    Amlodipine 5mg  Atorvastatin 20mg  Losartan 100mg  Metformin 500mg  Metoprolol succinate 25mg 24 hr tab  Montelukast 10mg tablet  Omega 3 acid ethyl esters 1g

## 2024-08-23 ENCOUNTER — TELEPHONE (OUTPATIENT)
Dept: UROLOGY | Facility: CLINIC | Age: 64
End: 2024-08-23

## 2024-08-23 NOTE — TELEPHONE ENCOUNTER
Called and spoke to the PT. PSA is needed PTV. PT verbalized understanding and will get done on Monday PTV and also confirmed appt.

## 2024-08-28 ENCOUNTER — TELEPHONE (OUTPATIENT)
Age: 64
End: 2024-08-28

## 2024-08-28 LAB — PSA SERPL-MCNC: 3.8 NG/ML (ref 0–4)

## 2024-08-28 NOTE — TELEPHONE ENCOUNTER
Mabel from [facility] called to inform that they need the latest note from the patient, please fax to [phone]

## 2024-08-29 ENCOUNTER — VBI (OUTPATIENT)
Dept: ADMINISTRATIVE | Facility: OTHER | Age: 64
End: 2024-08-29

## 2024-08-29 LAB
LEFT EYE DIABETIC RETINOPATHY: NORMAL
RIGHT EYE DIABETIC RETINOPATHY: NORMAL

## 2024-08-29 NOTE — TELEPHONE ENCOUNTER
08/29/24 11:19 AM     Chart reviewed for Diabetic Eye Exam was/were not submitted to the patient's insurance.     Betsy Rizvi MA   [facility]

## 2024-08-30 ENCOUNTER — OFFICE VISIT (OUTPATIENT)
Dept: UROLOGY | Facility: CLINIC | Age: 64
End: 2024-08-30
Payer: COMMERCIAL

## 2024-08-30 VITALS
TEMPERATURE: 97.7 F | DIASTOLIC BLOOD PRESSURE: 84 MMHG | HEIGHT: 69 IN | SYSTOLIC BLOOD PRESSURE: 128 MMHG | OXYGEN SATURATION: 98 % | BODY MASS INDEX: 30.66 KG/M2 | WEIGHT: 207 LBS | HEART RATE: 78 BPM

## 2024-08-30 DIAGNOSIS — R97.20 ELEVATED PSA: Primary | ICD-10-CM

## 2024-08-30 PROCEDURE — 99213 OFFICE O/P EST LOW 20 MIN: CPT | Performed by: PHYSICIAN ASSISTANT

## 2024-08-30 RX ORDER — PREDNISOLONE ACETATE 10 MG/ML
SUSPENSION/ DROPS OPHTHALMIC
COMMUNITY
Start: 2024-06-14

## 2024-08-30 RX ORDER — TOBRAMYCIN AND DEXAMETHASONE 3; 1 MG/ML; MG/ML
SUSPENSION/ DROPS OPHTHALMIC
COMMUNITY
Start: 2024-07-11

## 2024-08-30 NOTE — PROGRESS NOTES
8/30/2024      Chief Complaint   Patient presents with    Follow-up         Assessment and Plan    Low risk prostate cancer   - cT1c  - grade group 1 (Yeni 3+3=6 disease, 4 of 12 total positive cores-left medial base, left medial base, right lateral apex, right medial apex).  Maximum core positivity: 10%  - pre treatment PSA:  5.0  - Repeat PSA: 3.1  - Multiparametric MRI: 1.2 cm lesion in the left base corresponding to site of prior biopsy, no extracapsular extension seminal vesicle invasion  -Repeat biopsy (transperineal fusion, August 2023)- Dallas 6 disease, lesion present from the left base shows BPH only, 6 total positive cores of Dallas 6 disease, maximum core positivity: 15%  - PSA from 8/27/24 was 3.8   - ELENA today negative  - Follow up in 6 months with prostate MRI prior and repeat PSA       History of Present Illness  Alvaro Steel is a 63 y.o. male here for follow up evaluation of low risk prostate cancer managed on active surveillance.       Review of Systems   Constitutional:  Negative for chills and fever.   Respiratory:  Negative for shortness of breath.    Cardiovascular:  Negative for chest pain.   Gastrointestinal:  Negative for abdominal pain.   Genitourinary:  Negative for difficulty urinating, dysuria, flank pain, frequency, hematuria and urgency.   Neurological:  Negative for dizziness.                  Past Medical History  Past Medical History:   Diagnosis Date    Asthma     Benign prostatic hyperplasia     Chronic kidney disease     Diabetes mellitus (HCC)     Erectile dysfunction     Family history of prostate cancer     Hypertension     Kidney stone     Mixed hyperlipidemia     Prostate cancer (HCC)        Past Social History  Past Surgical History:   Procedure Laterality Date    CHOLECYSTECTOMY      EYE SURGERY Left     Detached retna    [place] BX PROSTATE STRTCTC SATURATION SAMPLING IMG GID N/A 08/17/2023    Procedure: TRANSPERINEAL MRI FUSION BIOPSY PROSTATE;  Surgeon: Destin  MD Halie;  Location: [facility] MAIN OR;  Service: Urology    PROSTATE BIOPSY      ROTATOR CUFF REPAIR      SHOULDER SURGERY  2015    VASECTOMY  1994     Social History     Tobacco Use   Smoking Status Never    Passive exposure: Past   Smokeless Tobacco Never   Tobacco Comments    I dont smoke       Past Family History  Family History   Problem Relation Age of Onset    Hypertension Mother     Diabetes Mother     Cancer Father     Hypertension Father     Prostate cancer Father     Hypertension Brother        Past Social history  Social History     Socioeconomic History    Marital status: /Civil Union     Spouse name: Not on file    Number of children: Not on file    Years of education: Not on file    Highest education level: Not on file   Occupational History    Not on file   Tobacco Use    Smoking status: Never     Passive exposure: Past    Smokeless tobacco: Never    Tobacco comments:     I dont smoke   Vaping Use    Vaping status: Some Days   Substance and Sexual Activity    Alcohol use: Yes     Alcohol/week: 1.0 standard drink of alcohol     Types: 1 Standard drinks or equivalent per week     Comment: sOCIAL    Drug use: Yes     Frequency: 1.0 times per week     Types: Marijuana     Comment: Vape pen    Sexual activity: Yes     Partners: Female     Birth control/protection: None   Other Topics Concern    Not on file   Social History Narrative    Not on file     Social Determinants of Health     Financial Resource Strain: Not on file   Food Insecurity: Not on file   Transportation Needs: Not on file   Physical Activity: Inactive (10/5/2020)    Exercise Vital Sign     Days of Exercise per Week: 0 days     Minutes of Exercise per Session: 0 min   Stress: No Stress Concern Present (11/1/2022)    [employer] - Occupational Stress Questionnaire     Feeling of Stress : Only a little   Social Connections: Not on file   Intimate Partner Violence: Not on file   Housing Stability: Not on  "file       Current Medications  Current Outpatient Medications   Medication Sig Dispense Refill    amLODIPine (NORVASC) 5 mg tablet Take 1 tablet (5 mg total) by mouth daily 100 tablet 1    aspirin (ECOTRIN LOW STRENGTH) 81 mg EC tablet Take 81 mg by mouth daily      atorvastatin (LIPITOR) 20 mg tablet Take 1 tablet (20 mg total) by mouth daily 100 tablet 1    atorvastatin (LIPITOR) 20 mg tablet Take 1 tablet (20 mg total) by mouth daily 100 tablet 1    dulaglutide (Trulicity) 1.5 MG/0.5ML injection Inject 0.5 mL (1.5 mg total) under the skin every 7 days 6 mL 1    Easy Touch FlipLock Needles 25G X 1\" MISC USE IN THE MORNING 100 each 0    glucose blood test strip Test once daily or as directed 100 each 2    Lancets (onetouch ultrasoft) lancets Test once daily or as directed 100 each 3    losartan (COZAAR) 100 MG tablet Take 1 tablet (100 mg total) by mouth daily 100 tablet 1    metFORMIN (GLUCOPHAGE-XR) 500 mg 24 hr tablet 1 tab po bid 200 tablet 1    metoprolol succinate (TOPROL-XL) 25 mg 24 hr tablet TAKE 1 & 1/2 (ONE & ONE-HALF) TABLETS BY MOUTH AT BEDTIME 135 tablet 1    montelukast (SINGULAIR) 10 mg tablet Take 1 tablet (10 mg total) by mouth daily at bedtime 100 tablet 1    omega-3-acid ethyl esters (LOVAZA) 1 g capsule Take 1 capsule (1 g total) by mouth 2 (two) times a day 200 capsule 1    prednisoLONE acetate (PRED FORTE) 1 % ophthalmic suspension INSTILL 1 DROP IN THE LEFT EYE FOUR TIMES DAILY      sildenafil (REVATIO) 20 mg tablet TAKE 2 TABLETS BY MOUTH ONCE DAILY 1  HOUR  BEFORE  INTERCOURSE. 60 tablet 1    tobramycin-dexamethasone (TOBRADEX) ophthalmic suspension INSTILL 1 DROP INTO LEFT EYE 4 TIMES DAILY      erythromycin (ILOTYCIN) ophthalmic ointment APPLY A SMALL AMOUNT INTO LEFT EYE FOUR TIMES A DAY (Patient not taking: Reported on 8/30/2024)       No current facility-administered medications for this visit.       Allergies  No Known Allergies      The following portions of the patient's history " "were reviewed and updated as appropriate: allergies, current medications, past medical history, past social history, past surgical history and problem list.      Vitals  Vitals:    08/30/24 1114   BP: 128/84   Pulse: 78   Temp: 97.7 °F (36.5 °C)   TempSrc: Temporal   SpO2: 98%   Weight: 93.9 kg (207 lb)   Height: 5' 9\" (1.753 m)           Physical Exam  Physical Exam  Constitutional:       Appearance: Normal appearance.   HENT:      Head: Normocephalic and atraumatic.      Right Ear: External ear normal.      Left Ear: External ear normal.      Nose: Nose normal.   Eyes:      General: No scleral icterus.     Conjunctiva/sclera: Conjunctivae normal.   Cardiovascular:      Pulses: Normal pulses.   Pulmonary:      Effort: Pulmonary effort is normal.   Genitourinary:     Prostate: Not tender and no nodules present.   Musculoskeletal:         General: Normal range of motion.      Cervical back: Normal range of motion.   Skin:     General: Skin is warm and dry.   Neurological:      General: No focal deficit present.      Mental Status: He is alert and oriented to person, place, and time.   Psychiatric:         Mood and Affect: Mood normal.         Behavior: Behavior normal.         Thought Content: Thought content normal.         Judgment: Judgment normal.           Results  No results found for this or any previous visit (from the past 1 hour(s)).]  Lab Results   Component Value Date    PSA 3.8 08/27/2024    PSA 3.2 02/21/2024    PSA 3.1 11/30/2022     Lab Results   Component Value Date    CALCIUM 8.8 03/10/2019    K 4.5 05/24/2024    CO2 25 05/24/2024     05/24/2024    BUN 18 05/24/2024    CREATININE 1.20 05/24/2024     Lab Results   Component Value Date    WBC 6.3 05/24/2024    HGB 13.8 05/24/2024    HCT 42.1 05/24/2024    MCV 92 05/24/2024     05/24/2024           Orders  Orders Placed This Encounter   Procedures    MRI prostate multiparametric wo w contrast     Standing Status:   Future     Standing " Expiration Date:   8/30/2028     Scheduling Instructions:      There is no preparation for this test. Please leave your jewelry and valuables at home, wedding rings are the exception. All patients will be required to change into a hospital gown and pants.  Street clothes are not permitted in the MRI.  Magnetic nail polish must be removed prior to arrival for your test. Please bring your insurance cards, a form of photo ID and a list of your medications with you. Arrive 15 minutes prior to your appointment time in order to register. Please bring any prior CT or MRI studies of this area that were not performed at a [facility].            To schedule this appointment, please contact Central Scheduling at [phone].            Prior to your appointment, please make sure you complete the MRI Screening Form when you e-Check in for your appointment. This will be available starting 7 days before your appointment in [facility]. You may receive an e-mail with an activation code if you do not have a [facility] account. If you do not have access to a device, we will complete your screening at your appointment.     Order Specific Question:   Reason for Exam     Answer:   prostate cancer     Order Specific Question:   What is the patient's sedation requirement?     Answer:   No Sedation     Order Specific Question:   Does the patient need medication for Claustrophobia? If yes, order medication at this point.     Answer:   No     Order Specific Question:   Does the patient wear a life vest, have an implanted cardiac device, a stimulation device, a sleep apnea stimulator, or a breast tissue expansion device?     Answer:   No     Order Specific Question:   Please evaluate if any patient has any of the following risk factors that require renal function labs prior to the MRI appointment.     Answer:   None of the Above     Order Specific Question:   Release to patient through [facility]     Answer:   Immediate    Basic metabolic  panel     This is a patient instruction: Patient fasting for 8 hours or longer recommended.     Standing Status:   Future     Number of Occurrences:   1     Standing Expiration Date:   8/30/2025    PSA Total, Diagnostic     Standing Status:   Future     Number of Occurrences:   1     Standing Expiration Date:   8/30/2025       Cynthia Moss

## 2024-10-22 ENCOUNTER — OFFICE VISIT (OUTPATIENT)
Dept: CARDIOLOGY CLINIC | Facility: CLINIC | Age: 64
End: 2024-10-22
Payer: COMMERCIAL

## 2024-10-22 VITALS
DIASTOLIC BLOOD PRESSURE: 88 MMHG | HEART RATE: 59 BPM | HEIGHT: 69 IN | SYSTOLIC BLOOD PRESSURE: 140 MMHG | OXYGEN SATURATION: 97 % | WEIGHT: 215.6 LBS | BODY MASS INDEX: 31.93 KG/M2 | TEMPERATURE: 97.4 F

## 2024-10-22 DIAGNOSIS — E66.811 CLASS 1 OBESITY DUE TO EXCESS CALORIES WITHOUT SERIOUS COMORBIDITY WITH BODY MASS INDEX (BMI) OF 31.0 TO 31.9 IN ADULT: ICD-10-CM

## 2024-10-22 DIAGNOSIS — Z01.810 PRE-OPERATIVE CARDIOVASCULAR EXAMINATION: Primary | ICD-10-CM

## 2024-10-22 DIAGNOSIS — E11.69 TYPE 2 DIABETES MELLITUS WITH OTHER SPECIFIED COMPLICATION, WITHOUT LONG-TERM CURRENT USE OF INSULIN (HCC): ICD-10-CM

## 2024-10-22 DIAGNOSIS — E66.09 CLASS 1 OBESITY DUE TO EXCESS CALORIES WITHOUT SERIOUS COMORBIDITY WITH BODY MASS INDEX (BMI) OF 31.0 TO 31.9 IN ADULT: ICD-10-CM

## 2024-10-22 DIAGNOSIS — R01.1 CARDIAC MURMUR: ICD-10-CM

## 2024-10-22 PROCEDURE — 93000 ELECTROCARDIOGRAM COMPLETE: CPT | Performed by: INTERNAL MEDICINE

## 2024-10-22 PROCEDURE — 99214 OFFICE O/P EST MOD 30 MIN: CPT | Performed by: INTERNAL MEDICINE

## 2024-10-22 NOTE — PROGRESS NOTES
[facility] [place]  [address] [address]  [phone]                                              Cardiology Office Consult  Alvaro Steel, 63 y.o. male  YOB: 1960  MRN: [id number] Encounter: [id number]      PCP - Fidel Almanzar MD  Referring Provider - No ref. provider found    No chief complaint on file.      Assessment  Preoperative cardiovascular examination  Hypertension  Cardiac murmur   obesity, Body mass index is 31.84 kg/m².     Plan  Assessment & Plan  Pre-operative cardiovascular examination  Planned surgery: eye surgery for detached retina ([facility])  Active cardiac complaints: None  Cardiac co-morbidities: None  Functional status: active, does yard work, able to walk up 2 flights of stairs   No known h/o CAD, heart failure  Vitals - reviewed and stable  ECG - Sinus bradycardia, otherwise normal ECG  Stress echo 2019 - 8:00 min, 93% MPHR, no chest pain, no diagnostic RWMA, no ischemia  Okay to proceed with planned procedure as low-moderate cardiac risk for a low risk surgery, without any further cardiac testing  Chelly-operative cardiac medication management  Anticoagulation/anti-platelets  Aspirin --> okay to hold for upto 7 days pre-operatively  Continue beta-blockers chelly-operatively    Cardiac murmur  Mild early systolic murmur, suggestive of likely aortic sclerosis/mild stenosis  Would benefit from a follow-up echocardiogram at some point --> this does not need to be completed before his upcoming eye surgery  He remains free of any cardiac or exertional symptoms otherwise  Class 1 obesity due to excess calories without serious comorbidity with body mass index (BMI) of 31.0 to 31.9 in adult    Type 2 diabetes mellitus with other specified complication, without long-term current use of insulin (HCC)    Lab Results   Component Value Date    HGBA1C 6.9 (H) 06/07/2024   A1c was up to 8.7% earlier this  year but is now improving and down to 6.9%  Follow-up with PCP      Results for orders placed or performed in visit on 10/22/24   POCT ECG    Impression    Sinus bradycardia  Otherwise normal ECG       Orders Placed This Encounter   Procedures    POCT ECG     Return if symptoms worsen or fail to improve.      History of Present Illness   63 y.o. male comes in for preoperative cardiovascular consultation prior to upcoming eye surgery.  He normally follows annually with Trina Bermeo at  [facility] since 2019, and is seeing me today for the first time    He reports no active complaints of chest pain, shortness of breath, palpitations or dizziness.  No known prior history of coronary artery disease or heart failure.  He is active and does a lot of yard work and work around the house and has not had any limitations with it.  He underwent retinal detachment surgery earlier in the year, which was without any perioperative cardiac complications.    Historical Information   Past Medical History:   Diagnosis Date    Asthma     Benign prostatic hyperplasia     Chronic kidney disease     Diabetes mellitus (HCC)     Erectile dysfunction     Family history of prostate cancer     Hypertension     Kidney stone     Mixed hyperlipidemia     Prostate cancer (HCC)      Past Surgical History:   Procedure Laterality Date    CHOLECYSTECTOMY      EYE SURGERY Left     Detached retna    MT BX PROSTATE STRTCTC SATURATION SAMPLING IMG GID N/A 08/17/2023    Procedure: TRANSPERINEAL MRI FUSION BIOPSY PROSTATE;  Surgeon: Destin Ambrose MD;  Location: [facility];  Service: Urology    PROSTATE BIOPSY      ROTATOR CUFF REPAIR      SHOULDER SURGERY  2015    VASECTOMY  1994     Family History   Problem Relation Age of Onset    Hypertension Mother     Diabetes Mother     Cancer Father     Hypertension Father     Prostate cancer Father     Hypertension Brother      Current Outpatient Medications   Medication Instructions    amLODIPine  "(NORVASC) 5 mg, Oral, Daily    aspirin (ECOTRIN LOW STRENGTH) 81 mg, Oral, Daily    atorvastatin (LIPITOR) 20 mg, Oral, Daily    atorvastatin (LIPITOR) 20 mg, Oral, Daily    dulaglutide (TRULICITY) 1.5 mg, Subcutaneous, Every 7 days    Easy Touch FlipLock Needles 25G X 1\" MISC USE IN THE MORNING    erythromycin (ILOTYCIN) ophthalmic ointment APPLY A SMALL AMOUNT INTO LEFT EYE FOUR TIMES A DAY    glucose blood test strip Test once daily or as directed    Lancets (onetouch ultrasoft) lancets Test once daily or as directed    losartan (COZAAR) 100 mg, Oral, Daily    metFORMIN (GLUCOPHAGE-XR) 500 mg 24 hr tablet 1 tab po bid    metoprolol succinate (TOPROL-XL) 25 mg 24 hr tablet TAKE 1 & 1/2 (ONE & ONE-HALF) TABLETS BY MOUTH AT BEDTIME    montelukast (SINGULAIR) 10 mg, Oral, Daily at bedtime    omega-3-acid ethyl esters (LOVAZA) 1 g, Oral, 2 times daily    prednisoLONE acetate (PRED FORTE) 1 % ophthalmic suspension INSTILL 1 DROP IN THE LEFT EYE FOUR TIMES DAILY    sildenafil (REVATIO) 20 mg tablet TAKE 2 TABLETS BY MOUTH ONCE DAILY 1  HOUR  BEFORE  INTERCOURSE.    tobramycin-dexamethasone (TOBRADEX) ophthalmic suspension INSTILL 1 DROP INTO LEFT EYE 4 TIMES DAILY      No Known Allergies  Social History     Socioeconomic History    Marital status: /Civil Union     Spouse name: None    Number of children: None    Years of education: None    Highest education level: None   Occupational History    None   Tobacco Use    Smoking status: Never     Passive exposure: Past    Smokeless tobacco: Never    Tobacco comments:     I dont smoke   Vaping Use    Vaping status: Some Days   Substance and Sexual Activity    Alcohol use: Yes     Alcohol/week: 1.0 standard drink of alcohol     Types: 1 Standard drinks or equivalent per week     Comment: sOCIAL    Drug use: Yes     Frequency: 1.0 times per week     Types: Marijuana     Comment: Vape pen    Sexual activity: Yes     Partners: Female     Birth control/protection: None " "  Other Topics Concern    None   Social History Narrative    None     Social Determinants of Health     Financial Resource Strain: Not on file   Food Insecurity: Not on file   Transportation Needs: Not on file   Physical Activity: Inactive (10/5/2020)    Exercise Vital Sign     Days of Exercise per Week: 0 days     Minutes of Exercise per Session: 0 min   Stress: No Stress Concern Present (11/1/2022)    [employer] - Occupational Stress Questionnaire     Feeling of Stress : Only a little   Social Connections: Not on file   Intimate Partner Violence: Not on file   Housing Stability: Not on file        Review of Systems   All other systems reviewed and are negative.        Vitals:  Vitals:    10/22/24 1433   BP: 140/88   BP Location: Left arm   Patient Position: Sitting   Cuff Size: Adult   Pulse: 59   Temp: (!) 97.4 °F (36.3 °C)   TempSrc: Tympanic   SpO2: 97%   Weight: 97.8 kg (215 lb 9.6 oz)   Height: 5' 9\" (1.753 m)     BMI - Body mass index is 31.84 kg/m².  Wt Readings from Last 7 Encounters:   10/22/24 97.8 kg (215 lb 9.6 oz)   08/30/24 93.9 kg (207 lb)   06/07/24 92.8 kg (204 lb 9.6 oz)   04/16/24 94.8 kg (209 lb)   03/06/24 99.7 kg (219 lb 12.8 oz)   03/05/24 99.3 kg (219 lb)   08/30/23 94.8 kg (209 lb)       Physical Exam  Vitals and nursing note reviewed.   Constitutional:       General: He is not in acute distress.     Appearance: He is well-developed. He is obese. He is not ill-appearing or diaphoretic.   HENT:      Head: Normocephalic and atraumatic.      Nose: No congestion.   Eyes:      General: No scleral icterus.     Conjunctiva/sclera: Conjunctivae normal.   Neck:      Vascular: No carotid bruit or JVD.   Cardiovascular:      Rate and Rhythm: Normal rate and regular rhythm.      Heart sounds: Murmur heard.      Crescendo decrescendo systolic murmur is present with a grade of 2/6.      No friction rub. No gallop.   Pulmonary:      Effort: Pulmonary effort is normal. No " "respiratory distress.      Breath sounds: Normal breath sounds. No wheezing or rales.   Chest:      Chest wall: No tenderness.   Abdominal:      General: There is no distension.      Palpations: Abdomen is soft.      Tenderness: There is no abdominal tenderness.   Musculoskeletal:         General: No swelling, tenderness or deformity.      Cervical back: Neck supple. No muscular tenderness.      Right lower leg: No edema.      Left lower leg: No edema.   Skin:     General: Skin is warm.   Neurological:      General: No focal deficit present.      Mental Status: He is alert and oriented to person, place, and time. Mental status is at baseline.   Psychiatric:         Mood and Affect: Mood normal.         Behavior: Behavior normal.         Thought Content: Thought content normal.         Labs:  CBC:   Lab Results   Component Value Date    WBC 6.3 05/24/2024    RBC 4.57 05/24/2024    HGB 13.8 05/24/2024    HCT 42.1 05/24/2024    MCV 92 05/24/2024     05/24/2024    RDW 12.3 05/24/2024       CMP:   Lab Results   Component Value Date    K 4.5 05/24/2024     05/24/2024    CO2 25 05/24/2024    BUN 18 05/24/2024    CREATININE 1.20 05/24/2024    EGFR 68 05/24/2024    CALCIUM 8.8 03/10/2019    AST 18 05/24/2024    ALT 11 05/24/2024    ALKPHOS 54 03/09/2019       Magnesium:  Lab Results   Component Value Date    MG 1.7 03/10/2019       Lipid Profile:   Lab Results   Component Value Date    HDL 36 (L) 05/24/2024    TRIG 97 05/24/2024    LDLCALC 61 05/24/2024       Thyroid Studies: No results found for: \"XQR4ICDENDOA\", \"T3FREE\", \"FREET4\", \"N3ZDYST\", \"D1FTIAX\"    A1c:  No components found for: \"HGA1C\"    INR:  No results found for: \"INR\"5    Cardiac testing:   Results for orders placed during the hospital encounter of 03/09/19    Echo complete with contrast if indicated    Narrative  [address] [address] [phone]    Transthoracic Echocardiogram  2D, " M-mode, Doppler, and Color Doppler    Study date:  11-Mar-2019    Patient: RIKKI HURST  MR number: [id number]  Account number: [id number]  : 1960  Age: 58 years  Gender: Male  Status: Outpatient  Location: Bedside  Height: 69 in  Weight: 224 lb  BP: 138/ 86 mmHg    Indications: CAD, Assess left ventricular function.    Diagnoses: I25.10 - Atherosclerotic heart disease of native coronary artery without angina pectoris    Sonographer:  Jessica Pleitez RDCS  Referring Physician:  MILADY Maynard  Group:  [facility]  Interpreting Physician:  Howie Carrillo MD    SUMMARY    LEFT VENTRICLE:  Systolic function was normal. Ejection fraction was estimated in the range of 55 % to 65 %.  There were no regional wall motion abnormalities.  There was mild concentric hypertrophy.    MITRAL VALVE:  There was trace regurgitation.    TRICUSPID VALVE:  There was trace regurgitation.    HISTORY: Symptoms: chest pain. PRIOR HISTORY: Risk factors: hypertension and diabetes.    PROCEDURE: The procedure was performed at the bedside. This was a routine study. The transthoracic approach was used. The study included complete 2D imaging, M-mode, complete spectral Doppler, and color Doppler. The heart rate was 71 bpm,  at the start of the study. Images were obtained from the parasternal, apical, subcostal, and suprasternal notch acoustic windows. Echocardiographic views were limited due to poor acoustic window availability, decreased penetration, and  lung interference. This was a technically difficult study.    LEFT VENTRICLE: Size was normal. Systolic function was normal. Ejection fraction was estimated in the range of 55 % to 65 %. There were no regional wall motion abnormalities. There was mild concentric hypertrophy. DOPPLER: Left ventricular  diastolic function parameters were normal.    RIGHT VENTRICLE: The size was normal. Systolic function was normal. Wall thickness was normal.    LEFT ATRIUM:  Size was normal.    RIGHT ATRIUM: Size was normal.    MITRAL VALVE: Valve structure was normal. There was normal leaflet separation. DOPPLER: The transmitral velocity was within the normal range. There was no evidence for stenosis. There was trace regurgitation.    AORTIC VALVE: The valve was probably trileaflet. Leaflets exhibited normal thickness and normal cuspal separation. The valve was not well visualized. DOPPLER: Transaortic velocity was within the normal range. There was no evidence for  stenosis. There was no regurgitation.    TRICUSPID VALVE: The valve structure was normal. There was normal leaflet separation. DOPPLER: The transtricuspid velocity was within the normal range. There was no evidence for stenosis. There was trace regurgitation.    PULMONIC VALVE: Leaflets exhibited normal thickness, no calcification, and normal cuspal separation. DOPPLER: The transpulmonic velocity was within the normal range. There was no regurgitation.    PERICARDIUM: There was no pericardial effusion. The pericardium was normal in appearance.    AORTA: The root exhibited normal size.    SYSTEMIC VEINS: IVC: The inferior vena cava was normal in size and course. Respirophasic changes were normal.    SYSTEM MEASUREMENT TABLES    2D mode  AoR Diam (2D): 33 mm  AoR Diam (2D): 35 mm  AoR Diam; Mean (2D): 34 mm  LA Dimension (2D): 26 mm  LA Dimension (2D): 28 mm  LA Dimension; Mean (2D): 27 mm  LA/Ao (2D): 0.8  EDV (2D-Cubed): 60159 mm3  EF (2D-Cubed): 74.6 %  ESV (2D-Cubed): 00804 mm3  FS (2D-Cubed): 36.6 %  FS (2D-Teich): 36.6 %  IVS/LVPW (2D): 0.9  IVSd (2D): 10.2 mm  IVSd (2D): 13 mm  IVSd (2D): 6.5 mm  IVSd; Mean chosen (2D): 9.9 mm  LVIDd (2D): 44.6 mm  LVIDd (2D): 46.1 mm  LVIDd; Mean (2D): 45.3 mm  LVIDs (2D): 29.4 mm  LVIDs (2D): 28 mm  LVIDs; Mean (2D): 28.7 mm  LVPWd (2D): 11 mm  LVPWd (2D): 11.8 mm  LVPWd; Mean (2D): 11.4 mm  Left Ventricular Ejection Fraction; Juan Jose; 2D mode;: 66.6 %  Left Ventricular End  Diastolic Volume; Teichholz; 2D mode;: 97691 mm3  Left Ventricular End Systolic Volume; Teichholz; 2D mode;: 31489 mm3  SV (2D-Cubed): 28511 mm3  Stroke Volume; Teichholz; 2D mode;: 75605 mm3  RVIDd (2D): 39.1 mm  RVIDd; Mean (2D): 39.1 mm  Right and Left Ventricular End Diastolic Diameter Ratio; 2D mode;: 0.9    Apical four chamber  EF (A4C): 61.6 %  LV MOD Diam; Recent value; End Diastole (A4C): 24.6 mm  LV MOD Diam; Recent value; End Diastole (A4C): 46.4 mm  LV MOD Diam; Recent value; End Diastole (A4C): 49.8 mm  LV MOD Diam; Recent value; End Diastole (A4C): 51.9 mm  LV MOD Diam; Recent value; End Diastole (A4C): 51.7 mm  LV MOD Diam; Recent value; End Diastole (A4C): 50.3 mm  LV MOD Diam; Recent value; End Diastole (A4C): 49.3 mm  LV MOD Diam; Recent value; End Diastole (A4C): 18.9 mm  LV MOD Diam; Recent value; End Diastole (A4C): 27 mm  LV MOD Diam; Recent value; End Diastole (A4C): 33 mm  LV MOD Diam; Recent value; End Diastole (A4C): 37.5 mm  LV MOD Diam; Recent value; End Diastole (A4C): 40.6 mm  LV MOD Diam; Recent value; End Diastole (A4C): 43.6 mm  LV MOD Diam; Recent value; End Diastole (A4C): 46.2 mm  LV MOD Diam; Recent value; End Diastole (A4C): 48 mm  LV MOD Diam; Recent value; End Diastole (A4C): 49 mm  LV MOD Diam; Recent value; End Diastole (A4C): 49.8 mm  LV MOD Diam; Recent value; End Diastole (A4C): 51.1 mm  LV MOD Diam; Recent value; End Diastole (A4C): 51.9 mm  LV MOD Diam; Recent value; End Diastole (A4C): 51.1 mm  LV MOD Diam; Recent value; End Systole (A4C): 18.2 mm  LV MOD Diam; Recent value; End Systole (A4C): 37.8 mm  LV MOD Diam; Recent value; End Systole (A4C): 36.2 mm  LV MOD Diam; Recent value; End Systole (A4C): 33.1 mm  LV MOD Diam; Recent value; End Systole (A4C): 9.9 mm  LV MOD Diam; Recent value; End Systole (A4C): 14.6 mm  LV MOD Diam; Recent value; End Systole (A4C): 18.2 mm  LV MOD Diam; Recent value; End Systole (A4C): 21.6 mm  LV MOD Diam; Recent value; End Systole  (A4C): 24.2 mm  LV MOD Diam; Recent value; End Systole (A4C): 26.5 mm  LV MOD Diam; Recent value; End Systole (A4C): 28.1 mm  LV MOD Diam; Recent value; End Systole (A4C): 29.7 mm  LV MOD Diam; Recent value; End Systole (A4C): 30.5 mm  LV MOD Diam; Recent value; End Systole (A4C): 31.2 mm  LV MOD Diam; Recent value; End Systole (A4C): 31.5 mm  LV MOD Diam; Recent value; End Systole (A4C): 32.5 mm  LV MOD Diam; Recent value; End Systole (A4C): 34.9 mm  LV MOD Diam; Recent value; End Systole (A4C): 37 mm  LV MOD Diam; Recent value; End Systole (A4C): 37.5 mm  LV MOD Diam; Recent value; End Systole (A4C): 37.2 mm  Left Ventricle diastolic major axis; Most recent value chosen; Method of Disks, Single Plane; 2D mode; Apical four chamber;: 83.4 mm  Left Ventricle systolic major axis; Most recent value chosen; Method of Disks, Single Plane; 2D mode; Apical four chamber;: 72.9 mm  Left Ventricular Diastolic Area; Most recent value chosen; Method of Disks, Single Plane; 2D mode; Apical four chamber;: 3650 mm2  Left Ventricular End Diastolic Volume; Most recent value chosen; Method of Disks, Single Plane; 2D mode; Apical four chamber;: 585243 mm3  Left Ventricular End Systolic Volume; Most recent value chosen; Method of Disks, Single Plane; 2D mode; Apical four chamber;: 74793 mm3  Left Ventricular Systolic Area; Most recent value chosen; Method of Disks, Single Plane; 2D mode; Apical four chamber;: 2090 mm2  SV (A4C): 50283 mm3    M mode  Inferior Vena Cava Diameter; During Expiration; M mode;: 15.8 mm  Inferior Vena Cava Diameter; During Inspiration; M mode;: 5.1 mm  Inferior Vena Cava Diameter; Mean; Mean value chosen; During Expiration; M mode;: 15.8 mm  Inferior Vena Cava Diameter; Mean; Mean value chosen; During Inspiration; M mode;: 5.1 mm  Tricuspid Annular Plane Systolic Excursion; Mean; Mean value chosen; Tricuspid Annulus; M mode;: 23 mm  Tricuspid Annular Plane Systolic Excursion; Tricuspid Annulus; M mode;: 23  mm    Tissue Doppler Imaging  LV Peak Early Padilla Tissue Harpreet; Mean; Medial MA (TDI): 92.5 mm/s  LV Peak Early Padilla Tissue Harpreet; Medial MA (TDI): 92.5 mm/s    Unspecified Scan Mode  Peak Grad; Mean; Antegrade Flow: 13 mm[Hg]  Vmax; Antegrade Flow: 1820 mm/s  Vmax; Mean; Antegrade Flow: 1820 mm/s  MV Peak Harpreet/LV Peak Tissue Harpreet E-Wave; Medial MA: 9.7  DT; Antegrade Flow: 437 ms  DT; Antegrade Flow: 468 ms  DT; Mean; Antegrade Flow: 453 ms  Dec McLennan; Antegrade Flow: 1970 mm/s2  Dec McLennan; Antegrade Flow: 1970 mm/s2  Dec McLennan; Mean; Antegrade Flow: 1970 mm/s2  MV E/A: 1.2  MV Peak A Harpreet: 771 mm/s  MV Peak A Harpreet; Mean: 771 mm/s  MV Peak E Harpreet; Antegrade Flow: 862 mm/s  MV Peak E Harpreet; Antegrade Flow: 924 mm/s  MV Peak E Harpreet; Mean; Antegrade Flow: 893 mm/s  MVA (PHT): 165 mm2  PHT: 128 ms  PHT: 137 ms  PHT; Mean: 133 ms    [facility] Accredited Echocardiography Laboratory    Prepared and electronically signed by    Howie Carrillo MD  Signed 11-Mar-2019 17:57:42    No results found for this or any previous visit.    No results found for this or any previous visit.    No results found for this or any previous visit.      MRI prostate multiparametric wo w contrast  Narrative: MULTIPARAMETRIC MRI OF THE PROSTATE WITH AND WITHOUT CONTRAST-WITH 3-D POSTPROCESSING.    INDICATION:   Yeni 6 prostate cancer on active surveillance.      COMPARISON: None    PSA LEVEL: 3.1 ng/mL on 11/30/2022, previously 5.0 ng/mL on 6/17/2022.  PRIOR BIOPSY DATE: 9/29/2022.  BIOPSY RESULTS: Acinar adenocarcinoma, Yeni score 6 (3+3) in left lateral base, left base, right lateral apex, and right apex.    TECHNIQUE: The following pulse sequences were obtained:  Small field-of-view axial T1-weighted and multiplanar T2-weighted images; DWI axial and ADC map; large field of view axial T2 weighted images; T1w in-phase and opposed-phase axials of entire pelvis   and dynamic 3D T1w axial before and during IV contrast injection.  Imaging  performed on 3.0T MRI     CONTRAST:  Gadobutrol (Gadavist) 10 mL of Gadobutrol injection (SINGLE-DOSE)     TECHNICAL LIMITATIONS: None.    FINDINGS:    PROSTATE:     Size: 4.1 x 5.1 x 6.4 cm = 69.6 cc.     Post-biopsy hemorrhage:  Small amount.    Central gland enlargement (BPH): Moderate with median lobe hypertrophy.    Focal lesions - localization as follows:    Lesion: 1       Size: 1.2 x 0.9 x 1.3 cm, series 5 image 12, series 3 image 15.       Location: Left base posterior transition zone       T2-weighted images: Score 4: Lenticular or noncircumscribed, homogeneous, moderately hypointense, and less than 1.5 cm in greatest dimension.       Diffusion-weighted images: Score 4: Focal markedly hypointense on ADC and markedly hyperintense on high b-value DWI; less than 1.5 cm in greatest dimension.       Dynamic post-contrast images: (+) Focal, earlier or contemporaneous with, enhancement of adjacent normal prostatic tissues; corresponds to a finding on T2-weighted and/or DWI.  PI-RADS Assessment Category: 4, High (clinically significant cancer is likely to be present).  Extra-prostatic extension (EPE): Does not abut capsule.    SEMINAL VESICLES: Unremarkable    Note: Clinically significant cancer is defined on pathology/histology as Amboy score greater than or equal to 7, and/or volume of greater than or equal to 0.5 mL, and/or extraprostatic extension.    URINARY BLADDER: Partially distended.    LYMPH NODES: No pelvic lymphadenopathy.    BONES: No suspicious osseous lesion.   Impression: 1. PI-RADSv2.1 Category 4 -High (clinically significant cancer is likely to be present). Index lesion #1 in the left base posterior transition zone measuring up to 1.3 cm.    2. No extraprostatic tumor, seminal vesicle invasion, pelvic lymphadenopathy, or pelvic osseous metastatic disease.    3. Calculated prostate volume of 69.6 cc.    The study was marked in EPIC for significant notification.    Prostate gland boundaries and  areas of concern for significant prostate cancer were segmented using 3D advanced post-processing on an independent [employer] system workstation with active physician participation.  The segmentation was performed should   MR-ultrasound fusion biopsy be required.    Workstation performed: [id number]

## 2024-10-22 NOTE — ASSESSMENT & PLAN NOTE
Lab Results   Component Value Date    HGBA1C 6.9 (H) 06/07/2024   A1c was up to 8.7% earlier this year but is now improving and down to 6.9%  Follow-up with PCP

## 2024-10-22 NOTE — ASSESSMENT & PLAN NOTE
Planned surgery: eye surgery for detached retina ([facility])  Active cardiac complaints: None  Cardiac co-morbidities: None  Functional status: active, does yard work, able to walk up 2 flights of stairs   No known h/o CAD, heart failure  Vitals - reviewed and stable  ECG - Sinus bradycardia, otherwise normal ECG  Stress echo 2019 - 8:00 min, 93% MPHR, no chest pain, no diagnostic RWMA, no ischemia  Okay to proceed with planned procedure as low-moderate cardiac risk for a low risk surgery, without any further cardiac testing  Shy-operative cardiac medication management  Anticoagulation/anti-platelets  Aspirin --> okay to hold for upto 7 days pre-operatively  Continue beta-blockers shy-operatively

## 2024-10-22 NOTE — LETTER
Cardiology Pre Operative Clearance      PRE OPERATIVE CARDIAC RISK ASSESSMENT    10/22/24    Alvaro GORDILLO [facility]  1960  [id number]    Date of Surgery: TBD    Type of Surgery: Eye surgery (for retinal detachment)    Surgeon: [facility]    No Cardiac Contraindication for Planned Surgical Procedures    Anticoagulation: yes - aspirin 81. Okay to hold aspirin for upto 7 days pre-operatively.    Physician Comment: Okay to proceed with planned surgery as low-moderate cardiac risk.    Electronically Signed: Homero Covington MD

## 2024-10-28 ENCOUNTER — TELEPHONE (OUTPATIENT)
Age: 64
End: 2024-10-28

## 2024-10-28 NOTE — TELEPHONE ENCOUNTER
Caller needs patients last office visit notes and recent EKG.   Patient is having Retina surgery on 11/21/24    Notes needed to be sent to anesthesia      Please fax   [phone]

## 2024-10-31 ENCOUNTER — TELEPHONE (OUTPATIENT)
Age: 64
End: 2024-10-31

## 2024-10-31 NOTE — TELEPHONE ENCOUNTER
Patient called if labs were needed for his 6 month follow up on 11/14/2024. AVS from 6/2024 mention A1C recheck. If so he uses [facility] as his preferred lab.

## 2024-11-01 DIAGNOSIS — E11.69 TYPE 2 DIABETES MELLITUS WITH OTHER SPECIFIED COMPLICATION, WITHOUT LONG-TERM CURRENT USE OF INSULIN (HCC): Primary | ICD-10-CM

## 2024-11-07 ENCOUNTER — RA CDI HCC (OUTPATIENT)
Dept: OTHER | Facility: HOSPITAL | Age: 64
End: 2024-11-07

## 2024-11-07 DIAGNOSIS — E11.51 TYPE II DIABETES MELLITUS WITH PERIPHERAL CIRCULATORY DISORDER (HCC): Primary | ICD-10-CM

## 2024-11-07 PROBLEM — J45.20 MILD INTERMITTENT ASTHMA: Status: ACTIVE | Noted: 2024-11-07

## 2024-11-07 PROBLEM — J45.20 MILD INTERMITTENT ASTHMA: Status: ACTIVE | Noted: 2019-03-05

## 2024-11-09 LAB
ALBUMIN SERPL-MCNC: 4.4 G/DL (ref 3.9–4.9)
ALP SERPL-CCNC: 76 IU/L (ref 44–121)
ALT SERPL-CCNC: 25 IU/L (ref 0–44)
AST SERPL-CCNC: 19 IU/L (ref 0–40)
BASOPHILS # BLD AUTO: 0.1 X10E3/UL (ref 0–0.2)
BASOPHILS NFR BLD AUTO: 1 %
BILIRUB SERPL-MCNC: 0.3 MG/DL (ref 0–1.2)
BUN SERPL-MCNC: 13 MG/DL (ref 8–27)
BUN/CREAT SERPL: 10 (ref 10–24)
CALCIUM SERPL-MCNC: 10 MG/DL (ref 8.6–10.2)
CHLORIDE SERPL-SCNC: 105 MMOL/L (ref 96–106)
CHOLEST SERPL-MCNC: 107 MG/DL (ref 100–199)
CHOLEST/HDLC SERPL: 3.1 RATIO (ref 0–5)
CO2 SERPL-SCNC: 23 MMOL/L (ref 20–29)
CREAT SERPL-MCNC: 1.24 MG/DL (ref 0.76–1.27)
EGFR: 65 ML/MIN/1.73
EOSINOPHIL # BLD AUTO: 0.5 X10E3/UL (ref 0–0.4)
EOSINOPHIL NFR BLD AUTO: 7 %
ERYTHROCYTE [DISTWIDTH] IN BLOOD BY AUTOMATED COUNT: 12.3 % (ref 11.6–15.4)
EST. AVERAGE GLUCOSE BLD GHB EST-MCNC: 154 MG/DL
GLOBULIN SER-MCNC: 2.4 G/DL (ref 1.5–4.5)
GLUCOSE SERPL-MCNC: 131 MG/DL (ref 70–99)
HBA1C MFR BLD: 7 % (ref 4.8–5.6)
HCT VFR BLD AUTO: 44.7 % (ref 37.5–51)
HDLC SERPL-MCNC: 35 MG/DL
HGB BLD-MCNC: 14.8 G/DL (ref 13–17.7)
IMM GRANULOCYTES # BLD: 0 X10E3/UL (ref 0–0.1)
IMM GRANULOCYTES NFR BLD: 0 %
LDLC SERPL CALC-MCNC: 53 MG/DL (ref 0–99)
LYMPHOCYTES # BLD AUTO: 2.2 X10E3/UL (ref 0.7–3.1)
LYMPHOCYTES NFR BLD AUTO: 33 %
MCH RBC QN AUTO: 30.6 PG (ref 26.6–33)
MCHC RBC AUTO-ENTMCNC: 33.1 G/DL (ref 31.5–35.7)
MCV RBC AUTO: 92 FL (ref 79–97)
MONOCYTES # BLD AUTO: 0.5 X10E3/UL (ref 0.1–0.9)
MONOCYTES NFR BLD AUTO: 8 %
NEUTROPHILS # BLD AUTO: 3.3 X10E3/UL (ref 1.4–7)
NEUTROPHILS NFR BLD AUTO: 51 %
PLATELET # BLD AUTO: 287 X10E3/UL (ref 150–450)
POTASSIUM SERPL-SCNC: 4.7 MMOL/L (ref 3.5–5.2)
PROT SERPL-MCNC: 6.8 G/DL (ref 6–8.5)
RBC # BLD AUTO: 4.84 X10E6/UL (ref 4.14–5.8)
SL AMB VLDL CHOLESTEROL CALC: 19 MG/DL (ref 5–40)
SODIUM SERPL-SCNC: 144 MMOL/L (ref 134–144)
TRIGL SERPL-MCNC: 100 MG/DL (ref 0–149)
TSH SERPL DL<=0.005 MIU/L-ACNC: 1.83 UIU/ML (ref 0.45–4.5)
WBC # BLD AUTO: 6.6 X10E3/UL (ref 3.4–10.8)

## 2024-11-14 ENCOUNTER — OFFICE VISIT (OUTPATIENT)
Dept: FAMILY MEDICINE CLINIC | Facility: CLINIC | Age: 64
End: 2024-11-14
Payer: COMMERCIAL

## 2024-11-14 VITALS
WEIGHT: 214 LBS | HEIGHT: 69 IN | OXYGEN SATURATION: 97 % | SYSTOLIC BLOOD PRESSURE: 130 MMHG | HEART RATE: 68 BPM | BODY MASS INDEX: 31.7 KG/M2 | DIASTOLIC BLOOD PRESSURE: 82 MMHG

## 2024-11-14 DIAGNOSIS — C61 PROSTATE CANCER (HCC): ICD-10-CM

## 2024-11-14 DIAGNOSIS — E78.2 MIXED HYPERLIPIDEMIA: ICD-10-CM

## 2024-11-14 DIAGNOSIS — L72.3 SEBACEOUS CYST: ICD-10-CM

## 2024-11-14 DIAGNOSIS — I10 ESSENTIAL HYPERTENSION: ICD-10-CM

## 2024-11-14 DIAGNOSIS — E11.51 TYPE II DIABETES MELLITUS WITH PERIPHERAL CIRCULATORY DISORDER (HCC): Primary | ICD-10-CM

## 2024-11-14 PROCEDURE — 99214 OFFICE O/P EST MOD 30 MIN: CPT | Performed by: FAMILY MEDICINE

## 2024-11-14 NOTE — PROGRESS NOTES
Name: Alvaro Steel      : 1960      MRN: [id number]  Encounter Provider: Fidel Almanzar MD  Encounter Date: 2024   Encounter department: [facility]    Assessment & Plan  Type II diabetes mellitus with peripheral circulatory disorder (HCC)    Lab Results   Component Value Date    HGBA1C 7.0 (H) 2024   I reviewed with pt.  A1C is borderline. Urged diet compliance.  Continue present care.  Recheck 6m         Essential hypertension  Well controlled. Cont present treatment. Monitor labs. Recheck 6m         Mixed hyperlipidemia  Recent LDL = 53 on atorvastatin 20mg qd.  Continue present care.  Recheck 6m       Sebaceous cyst  Pt would like it removed.  Refer to general surgery  Orders:    Ambulatory Referral to General Surgery; Future    Prostate cancer (HCC)  Under observation.  F/u with Uro. Monitor PSA.  Recheck 6m            History of Present Illness     f/u multiple med issues  - pt is doing well  - pt still struggling status post left retinal repair.  He is to have procedure done on  to have will removed and a gas bubble placed.  Ophthalmology/retinal specialist has been happy with the repair so far.  - pt states that he has been struggling at times with his diet, particularly when he is stressed about his left eye.  A1c done recently was 7.0.  LDL is at goal.  Creatinine stable as well.  Patient states he is compliant with all of his medications  - pt is up-to-date with urology.  Interestingly, both his prostatic size, PSA and the size of his prostate lesion (prostate cancer) have all decreased without treatment.  Patient continues to follow-up for monitoring.  - pt denies CP, palpitations, lightheadedness or other CV symptoms with or without exertion  - pt has a small lump that he would like checked on his back. No pain or drainage  - pt denies any new GI complaints        Review of Systems   Constitutional: Negative.    HENT: Negative.     Eyes:   "Positive for visual disturbance.   Respiratory: Negative.     Cardiovascular: Negative.    Gastrointestinal: Negative.    Genitourinary: Negative.    Musculoskeletal: Negative.    Skin: Negative.         Back \"lump\"   Neurological: Negative.    Psychiatric/Behavioral: Negative.       Past Medical History:   Diagnosis Date    Asthma     Benign prostatic hyperplasia     Chronic kidney disease     Diabetes mellitus (HCC)     Erectile dysfunction     Family history of prostate cancer     Hypertension     Kidney stone     Mixed hyperlipidemia     Prostate cancer (HCC)      Past Surgical History:   Procedure Laterality Date    CHOLECYSTECTOMY      EYE SURGERY Left     Detached retna    [place] BX PROSTATE STRTCTC SATURATION SAMPLING IMG GID N/A 08/17/2023    Procedure: TRANSPERINEAL MRI FUSION BIOPSY PROSTATE;  Surgeon: Destin Ambrose MD;  Location: [facility];  Service: Urology    PROSTATE BIOPSY      ROTATOR CUFF REPAIR      SHOULDER SURGERY  2015    VASECTOMY  1994     Family History   Problem Relation Age of Onset    Hypertension Mother     Diabetes Mother     Cancer Father     Hypertension Father     Prostate cancer Father     Hypertension Brother      Social History     Tobacco Use    Smoking status: Never     Passive exposure: Past    Smokeless tobacco: Never    Tobacco comments:     I dont smoke   Vaping Use    Vaping status: Some Days   Substance and Sexual Activity    Alcohol use: Yes     Alcohol/week: 1.0 standard drink of alcohol     Types: 1 Standard drinks or equivalent per week     Comment: sOCIAL    Drug use: Yes     Frequency: 1.0 times per week     Types: Marijuana     Comment: Vape pen    Sexual activity: Yes     Partners: Female     Birth control/protection: None     Current Outpatient Medications on File Prior to Visit   Medication Sig    amLODIPine (NORVASC) 5 mg tablet Take 1 tablet (5 mg total) by mouth daily    aspirin (ECOTRIN LOW STRENGTH) 81 mg EC tablet Take 81 mg by mouth daily    " "atorvastatin (LIPITOR) 20 mg tablet Take 1 tablet (20 mg total) by mouth daily    dulaglutide (Trulicity) 1.5 MG/0.5ML injection Inject 0.5 mL (1.5 mg total) under the skin every 7 days    glucose blood test strip Test once daily or as directed    Lancets (onetouch ultrasoft) lancets Test once daily or as directed    losartan (COZAAR) 100 MG tablet Take 1 tablet (100 mg total) by mouth daily    metFORMIN (GLUCOPHAGE-XR) 500 mg 24 hr tablet 1 tab po bid    metoprolol succinate (TOPROL-XL) 25 mg 24 hr tablet TAKE 1 & 1/2 (ONE & ONE-HALF) TABLETS BY MOUTH AT BEDTIME    montelukast (SINGULAIR) 10 mg tablet Take 1 tablet (10 mg total) by mouth daily at bedtime    omega-3-acid ethyl esters (LOVAZA) 1 g capsule Take 1 capsule (1 g total) by mouth 2 (two) times a day    sildenafil (REVATIO) 20 mg tablet TAKE 2 TABLETS BY MOUTH ONCE DAILY 1  HOUR  BEFORE  INTERCOURSE.     No Known Allergies  Immunization History   Administered Date(s) Administered    COVID-19 MODERNA VACC 0.5 ML IM 04/19/2021, 05/17/2021    Tdap 10/05/2021     Objective   /82   Pulse 68   Ht 5' 9\" (1.753 m)   Wt 97.1 kg (214 lb)   SpO2 97%   BMI 31.60 kg/m²     Physical Exam  Vitals reviewed.   HENT:      Head: Normocephalic.      Right Ear: Tympanic membrane, ear canal and external ear normal.      Left Ear: Tympanic membrane, ear canal and external ear normal.      Nose: Nose normal.      Mouth/Throat:      Mouth: Mucous membranes are moist.   Eyes:      Extraocular Movements: Extraocular movements intact.      Conjunctiva/sclera: Conjunctivae normal.      Pupils: Pupils are equal, round, and reactive to light.   Cardiovascular:      Rate and Rhythm: Normal rate and regular rhythm.      Pulses: Normal pulses. no weak pulses.           Dorsalis pedis pulses are 2+ on the right side and 2+ on the left side.   Pulmonary:      Effort: Pulmonary effort is normal.      Breath sounds: Normal breath sounds.   Abdominal:      General: There is no " distension.      Palpations: There is no mass.      Tenderness: There is no abdominal tenderness.   Musculoskeletal:         General: No swelling, tenderness or deformity. Normal range of motion.      Cervical back: No tenderness.      Right lower leg: No edema.      Left lower leg: No edema.   Feet:      Right foot:      Skin integrity: No ulcer, skin breakdown, erythema, warmth, callus or dry skin.      Left foot:      Skin integrity: No ulcer, skin breakdown, erythema, warmth, callus or dry skin.   Lymphadenopathy:      Cervical: No cervical adenopathy.   Skin:     General: Skin is warm.      Capillary Refill: Capillary refill takes less than 2 seconds.      Findings: Lesion (upper back with noninflammed dermal cyst) present.   Neurological:      General: No focal deficit present.      Mental Status: He is alert and oriented to person, place, and time.   Psychiatric:         Mood and Affect: Mood normal.         Behavior: Behavior normal.         Thought Content: Thought content normal.         Judgment: Judgment normal.           Patient's shoes and socks removed.    Right Foot/Ankle   Right Foot Inspection  Skin Exam: skin normal and skin intact. No dry skin, no warmth, no callus, no erythema, no maceration, no abnormal color, no pre-ulcer, no ulcer and no callus.     Toe Exam: ROM and strength within normal limits.     Sensory   Vibration: intact  Monofilament testing: intact    Vascular  Capillary refills: < 3 seconds  The right DP pulse is 2+.     Left Foot/Ankle  Left Foot Inspection  Skin Exam: skin normal and skin intact. No dry skin, no warmth, no erythema, no maceration, normal color, no pre-ulcer, no ulcer and no callus.     Toe Exam: ROM and strength within normal limits.     Sensory   Vibration: intact  Monofilament testing: intact    Vascular  Capillary refills: < 3 seconds  The left DP pulse is 2+.     Assign Risk Category  No deformity present  No loss of protective sensation  No weak  pulses  Risk: 0

## 2024-11-14 NOTE — ASSESSMENT & PLAN NOTE
Lab Results   Component Value Date    HGBA1C 7.0 (H) 11/08/2024   I reviewed with pt.  A1C is borderline. Urged diet compliance.  Continue present care.  Recheck 6m

## 2025-01-08 ENCOUNTER — CONSULT (OUTPATIENT)
Dept: SURGERY | Facility: CLINIC | Age: 65
End: 2025-01-08
Payer: MEDICARE

## 2025-01-08 VITALS
RESPIRATION RATE: 18 BRPM | HEIGHT: 69 IN | OXYGEN SATURATION: 96 % | HEART RATE: 67 BPM | BODY MASS INDEX: 30.96 KG/M2 | SYSTOLIC BLOOD PRESSURE: 126 MMHG | WEIGHT: 209 LBS | DIASTOLIC BLOOD PRESSURE: 70 MMHG | TEMPERATURE: 97.8 F

## 2025-01-08 DIAGNOSIS — L91.8 SKIN TAG: ICD-10-CM

## 2025-01-08 DIAGNOSIS — L72.3 SEBACEOUS CYST: Primary | ICD-10-CM

## 2025-01-08 DIAGNOSIS — L98.9 SKIN LESION OF BACK: ICD-10-CM

## 2025-01-08 PROCEDURE — 99203 OFFICE O/P NEW LOW 30 MIN: CPT | Performed by: STUDENT IN AN ORGANIZED HEALTH CARE EDUCATION/TRAINING PROGRAM

## 2025-01-08 RX ORDER — CHLORHEXIDINE GLUCONATE 40 MG/ML
SOLUTION TOPICAL DAILY PRN
OUTPATIENT
Start: 2025-01-08

## 2025-01-08 NOTE — PROGRESS NOTES
HCC coding opportunities          Chart Reviewed number of suggestions sent to Provider: 3     Patients Insurance        Commercial Insurance: [email or web address]     E11.36  E11.51  J45.20     Sent it for her to [facility] in [place]

## 2025-01-08 NOTE — ASSESSMENT & PLAN NOTE
64-year-old male with skin lesion of back x 2  -Notes areas of itching on his upper back and small bumps in the area  -He denies any infection or drainage  -2 small skin lesions next to each other roughly 3 mm x 3 mm each on the back, dilated pore of Sharon also present  -CBC and CMP from 11/8/2024 reviewed  -Hemoglobin A1c from 11/8/2024 reviewed, noted to be 7  -Primary care physician note from 11/14/2024 reviewed  -Plan for excision of masses of back under local anesthesia    All risks, benefits, alternatives of the procedure were discussed at length with the patient.  Risks include bleeding, infection, damage to surrounding structures, recurrence.  All questions were answered to satisfaction.  The patient voiced understanding and signed consent.    Orders:    Case request operating room: EXCISION  BIOPSY LESION/MASS BACK x 3, EXCISION BIOPSY LESION /MASS FACIAL/NECK - Posterior Skin Tag, EXCISION BIOPSY TISSUE LESION/MASS UPPER EXTREMITY - B/L Axilla Skin Tags; Standing

## 2025-01-08 NOTE — ASSESSMENT & PLAN NOTE
64-year-old male with sebaceous cyst of back  -Patient notes a mass on the middle of his back  -Has history of prior sebaceous cyst excision  -Denies any infection or drainage  -Sebaceous cyst middle of back roughly 1 x 1 cm  -CBC and CMP from 11/8/2024 reviewed  -Hemoglobin A1c from 11/8/2024 reviewed, noted to be 7  -Primary care physician note from 11/14/2024 reviewed  -Plan for excision of sebaceous cyst of back under local anesthesia    All risks, benefits, alternatives of the procedure were discussed at length with the patient.  Risks include bleeding, infection, damage to surrounding structures, recurrence.  All questions were answered to satisfaction.  The patient voiced understanding and signed consent.    Orders:    Ambulatory Referral to General Surgery    Case request operating room: EXCISION  BIOPSY LESION/MASS BACK x 3, EXCISION BIOPSY LESION /MASS FACIAL/NECK - Posterior Skin Tag, EXCISION BIOPSY TISSUE LESION/MASS UPPER EXTREMITY - B/L Axilla Skin Tags; Standing

## 2025-01-08 NOTE — ASSESSMENT & PLAN NOTE
64-year-old male with skin tags of posterior neck, bilateral axilla  -Notes multiple skin tags on the bilateral axilla and 1 on the posterior neck  -Causes intermittent irritation and sometimes gets pulled  -Skin tags bilateral axilla and posterior neck  -CBC and CMP from 11/8/2024 reviewed  -Hemoglobin A1c from 11/8/2024 reviewed, noted to be 7  -Primary care physician note from 11/14/2024 reviewed  -Plan for excision of bilateral axillary skin tags and posterior neck skin tag under local anesthesia    All risks, benefits, alternatives of the procedure were discussed at length with the patient.  Risks include bleeding, infection, damage to surrounding structures, recurrence.  All questions were answered to satisfaction.  The patient voiced understanding and signed consent.    Orders:    Case request operating room: EXCISION  BIOPSY LESION/MASS BACK x 3, EXCISION BIOPSY LESION /MASS FACIAL/NECK - Posterior Skin Tag, EXCISION BIOPSY TISSUE LESION/MASS UPPER EXTREMITY - B/L Axilla Skin Tags; Standing

## 2025-01-08 NOTE — PROGRESS NOTES
Name: Alvaro Steel      : 1960      MRN: [id number]  Encounter Provider: Randolph Santoro DO  Encounter Date: 2025   Encounter department: [facility]  :  Assessment & Plan  Sebaceous cyst  64-year-old male with sebaceous cyst of back  -Patient notes a mass on the middle of his back  -Has history of prior sebaceous cyst excision  -Denies any infection or drainage  -Sebaceous cyst middle of back roughly 1 x 1 cm  -CBC and CMP from 2024 reviewed  -Hemoglobin A1c from 2024 reviewed, noted to be 7  -Primary care physician note from 2024 reviewed  -Plan for excision of sebaceous cyst of back under local anesthesia    All risks, benefits, alternatives of the procedure were discussed at length with the patient.  Risks include bleeding, infection, damage to surrounding structures, recurrence.  All questions were answered to satisfaction.  The patient voiced understanding and signed consent.    Orders:    Ambulatory Referral to General Surgery    Case request operating room: EXCISION  BIOPSY LESION/MASS BACK x 3, EXCISION BIOPSY LESION /MASS FACIAL/NECK - Posterior Skin Tag, EXCISION BIOPSY TISSUE LESION/MASS UPPER EXTREMITY - B/L Axilla Skin Tags; Standing    Skin lesion of back  64-year-old male with skin lesion of back x 2  -Notes areas of itching on his upper back and small bumps in the area  -He denies any infection or drainage  -2 small skin lesions next to each other roughly 3 mm x 3 mm each on the back, dilated pore of Sharon also present  -CBC and CMP from 2024 reviewed  -Hemoglobin A1c from 2024 reviewed, noted to be 7  -Primary care physician note from 2024 reviewed  -Plan for excision of masses of back under local anesthesia    All risks, benefits, alternatives of the procedure were discussed at length with the patient.  Risks include bleeding, infection, damage to surrounding structures, recurrence.  All questions were answered to  satisfaction.  The patient voiced understanding and signed consent.    Orders:    Case request operating room: EXCISION  BIOPSY LESION/MASS BACK x 3, EXCISION BIOPSY LESION /MASS FACIAL/NECK - Posterior Skin Tag, EXCISION BIOPSY TISSUE LESION/MASS UPPER EXTREMITY - B/L Axilla Skin Tags; Standing    Skin tag  64-year-old male with skin tags of posterior neck, bilateral axilla  -Notes multiple skin tags on the bilateral axilla and 1 on the posterior neck  -Causes intermittent irritation and sometimes gets pulled  -Skin tags bilateral axilla and posterior neck  -CBC and CMP from 11/8/2024 reviewed  -Hemoglobin A1c from 11/8/2024 reviewed, noted to be 7  -Primary care physician note from 11/14/2024 reviewed  -Plan for excision of bilateral axillary skin tags and posterior neck skin tag under local anesthesia    All risks, benefits, alternatives of the procedure were discussed at length with the patient.  Risks include bleeding, infection, damage to surrounding structures, recurrence.  All questions were answered to satisfaction.  The patient voiced understanding and signed consent.    Orders:    Case request operating room: EXCISION  BIOPSY LESION/MASS BACK x 3, EXCISION BIOPSY LESION /MASS FACIAL/NECK - Posterior Skin Tag, EXCISION BIOPSY TISSUE LESION/MASS UPPER EXTREMITY - B/L Axilla Skin Tags; Standing        History of Present Illness   HPI  Alvaro Steel is a 64 y.o. male who presents for evaluation of multiple skin lesions.  He notes a sebaceous cyst of his back along with other small bumps that become itchy.  Denies any infection or drainage.  He also notes multiple skin tags in his bilateral axilla and 1 on his posterior neck.  These cause him intermittent irritation and pulling.  History obtained from: patient    Review of Systems   Constitutional:  Negative for chills, fatigue and fever.   HENT:  Negative for congestion, hearing loss, rhinorrhea and sore throat.    Eyes:  Negative for pain and discharge.    Respiratory:  Negative for cough, chest tightness and shortness of breath.    Cardiovascular:  Negative for chest pain and palpitations.   Gastrointestinal:  Negative for abdominal pain, constipation, diarrhea, nausea and vomiting.   Endocrine: Negative for cold intolerance and heat intolerance.   Genitourinary:  Negative for difficulty urinating and dysuria.   Musculoskeletal:  Negative for back pain and neck pain.   Skin:  Negative for color change and rash.        Positive sebaceous cyst of back, skin lesion of back, skin tags   Allergic/Immunologic: Negative for environmental allergies and food allergies.   Neurological:  Negative for seizures and headaches.   Hematological:  Does not bruise/bleed easily.   Psychiatric/Behavioral:  Negative for confusion and hallucinations.      Medical History Reviewed by provider this encounter:  Tobacco  Allergies  Meds  Problems  Med Hx  Surg Hx  Fam Hx     .  Past Medical History   Past Medical History:   Diagnosis Date    Asthma     Benign prostatic hyperplasia     Chronic kidney disease     Diabetes mellitus (HCC)     Erectile dysfunction     Family history of prostate cancer     Hypertension     Kidney stone     Mixed hyperlipidemia     Prostate cancer (HCC)      Past Surgical History:   Procedure Laterality Date    CHOLECYSTECTOMY      COLONOSCOPY      EYE SURGERY Left     Detached retna    [place] BX PROSTATE STRTCTC SATURATION SAMPLING IMG GID N/A 08/17/2023    Procedure: TRANSPERINEAL MRI FUSION BIOPSY PROSTATE;  Surgeon: Destin Ambrose MD;  Location: [facility] MAIN OR;  Service: Urology    PROSTATE BIOPSY      ROTATOR CUFF REPAIR Bilateral     SHOULDER SURGERY Bilateral 2015    VASECTOMY  1994     Family History   Problem Relation Age of Onset    Hypertension Mother     Diabetes Mother     Cancer Father     Hypertension Father     Prostate cancer Father     Hypertension Brother       reports that he has never smoked. He has been exposed to tobacco smoke. He  has never used smokeless tobacco. He reports current alcohol use of about 1.0 standard drink of alcohol per week. He reports current drug use. Frequency: 1.00 time per week. Drug: Marijuana.  Current Outpatient Medications on File Prior to Visit   Medication Sig Dispense Refill    amLODIPine (NORVASC) 5 mg tablet Take 1 tablet (5 mg total) by mouth daily 100 tablet 1    aspirin (ECOTRIN LOW STRENGTH) 81 mg EC tablet Take 81 mg by mouth daily      atorvastatin (LIPITOR) 20 mg tablet Take 1 tablet (20 mg total) by mouth daily 100 tablet 1    dulaglutide (Trulicity) 1.5 MG/0.5ML injection Inject 0.5 mL (1.5 mg total) under the skin every 7 days 6 mL 1    glucose blood test strip Test once daily or as directed 100 each 2    Lancets (onetouch ultrasoft) lancets Test once daily or as directed 100 each 3    losartan (COZAAR) 100 MG tablet Take 1 tablet (100 mg total) by mouth daily 100 tablet 1    metFORMIN (GLUCOPHAGE-XR) 500 mg 24 hr tablet 1 tab po bid 200 tablet 1    metoprolol succinate (TOPROL-XL) 25 mg 24 hr tablet TAKE 1 & 1/2 (ONE & ONE-HALF) TABLETS BY MOUTH AT BEDTIME 135 tablet 1    montelukast (SINGULAIR) 10 mg tablet Take 1 tablet (10 mg total) by mouth daily at bedtime 100 tablet 1    omega-3-acid ethyl esters (LOVAZA) 1 g capsule Take 1 capsule (1 g total) by mouth 2 (two) times a day 200 capsule 1    sildenafil (REVATIO) 20 mg tablet TAKE 2 TABLETS BY MOUTH ONCE DAILY 1  HOUR  BEFORE  INTERCOURSE. 60 tablet 1     No current facility-administered medications on file prior to visit.   No Known Allergies   Current Outpatient Medications on File Prior to Visit   Medication Sig Dispense Refill    amLODIPine (NORVASC) 5 mg tablet Take 1 tablet (5 mg total) by mouth daily 100 tablet 1    aspirin (ECOTRIN LOW STRENGTH) 81 mg EC tablet Take 81 mg by mouth daily      atorvastatin (LIPITOR) 20 mg tablet Take 1 tablet (20 mg total) by mouth daily 100 tablet 1    dulaglutide (Trulicity) 1.5 MG/0.5ML injection Inject  "0.5 mL (1.5 mg total) under the skin every 7 days 6 mL 1    glucose blood test strip Test once daily or as directed 100 each 2    Lancets (onetouch ultrasoft) lancets Test once daily or as directed 100 each 3    losartan (COZAAR) 100 MG tablet Take 1 tablet (100 mg total) by mouth daily 100 tablet 1    metFORMIN (GLUCOPHAGE-XR) 500 mg 24 hr tablet 1 tab po bid 200 tablet 1    metoprolol succinate (TOPROL-XL) 25 mg 24 hr tablet TAKE 1 & 1/2 (ONE & ONE-HALF) TABLETS BY MOUTH AT BEDTIME 135 tablet 1    montelukast (SINGULAIR) 10 mg tablet Take 1 tablet (10 mg total) by mouth daily at bedtime 100 tablet 1    omega-3-acid ethyl esters (LOVAZA) 1 g capsule Take 1 capsule (1 g total) by mouth 2 (two) times a day 200 capsule 1    sildenafil (REVATIO) 20 mg tablet TAKE 2 TABLETS BY MOUTH ONCE DAILY 1  HOUR  BEFORE  INTERCOURSE. 60 tablet 1     No current facility-administered medications on file prior to visit.      Social History     Tobacco Use    Smoking status: Never     Passive exposure: Past    Smokeless tobacco: Never    Tobacco comments:     I dont smoke   Vaping Use    Vaping status: Some Days    Substances: THC   Substance and Sexual Activity    Alcohol use: Yes     Alcohol/week: 1.0 standard drink of alcohol     Types: 1 Standard drinks or equivalent per week     Comment: occa    Drug use: Yes     Frequency: 1.0 times per week     Types: Marijuana     Comment: Vape pen    Sexual activity: Yes     Partners: Female     Birth control/protection: None        Objective   /70 (BP Location: Left arm, Patient Position: Sitting, Cuff Size: Standard)   Pulse 67   Temp 97.8 °F (36.6 °C)   Resp 18   Ht 5' 9\" (1.753 m)   Wt 94.8 kg (209 lb)   SpO2 96%   BMI 30.86 kg/m²      Physical Exam  Constitutional:       Appearance: Normal appearance.   HENT:      Head: Normocephalic and atraumatic.      Nose: Nose normal.   Eyes:      General: No scleral icterus.     Conjunctiva/sclera: Conjunctivae normal. "   Cardiovascular:      Rate and Rhythm: Normal rate and regular rhythm.      Heart sounds: Normal heart sounds.   Pulmonary:      Effort: Pulmonary effort is normal.      Breath sounds: Normal breath sounds.   Abdominal:      General: There is no distension.   Musculoskeletal:         General: No signs of injury.   Skin:     General: Skin is warm.      Coloration: Skin is not jaundiced.      Comments: Skin tags bilateral axilla and posterior neck, sebaceous cyst middle of back roughly 1 x 1 cm, 2 small skin lesions next to each other roughly 3 mm x 3 mm each on the back, dilated pore of Sharon also present on the back   Neurological:      General: No focal deficit present.      Mental Status: He is alert and oriented to person, place, and time.   Psychiatric:         Mood and Affect: Mood normal.         Behavior: Behavior normal.

## 2025-01-13 DIAGNOSIS — I10 ESSENTIAL HYPERTENSION: ICD-10-CM

## 2025-01-13 DIAGNOSIS — E78.2 MIXED HYPERLIPIDEMIA: ICD-10-CM

## 2025-01-13 DIAGNOSIS — J45.20 MILD INTERMITTENT ASTHMA WITHOUT COMPLICATION: ICD-10-CM

## 2025-01-13 DIAGNOSIS — E11.69 TYPE 2 DIABETES MELLITUS WITH OTHER SPECIFIED COMPLICATION, WITHOUT LONG-TERM CURRENT USE OF INSULIN (HCC): ICD-10-CM

## 2025-01-13 NOTE — TELEPHONE ENCOUNTER
*PHARMACY CHANGE*  Pt is requesting a 90 day supply be sent for all Rx's    Reason for call:   [x] Refill   [] Prior Auth  [] Other:     Office:   [x] PCP/Provider - [facility]   [] Specialty/Provider -     Medication:   ~ omega-3-acid ethyl esters (LOVAZA) 1 g capsule - Take 1 capsule (1 g total) by mouth 2 (two) times a day   ~ atorvastatin (LIPITOR) 20 mg tablet - Take 1 tablet (20 mg total) by mouth daily   ~ amLODIPine (NORVASC) 5 mg tablet - Take 1 tablet (5 mg total) by mouth daily   ~ montelukast (SINGULAIR) 10 mg tablet - Take 1 tablet (10 mg total) by mouth daily at bedtime   ~ metoprolol succinate (TOPROL-XL) 25 mg 24 hr tablet - TAKE 1 & 1/2 (ONE & ONE-HALF) TABLETS BY MOUTH AT BEDTIME   ~ metFORMIN (GLUCOPHAGE-XR) 500 mg 24 hr tablet - 1 tab po bid   ~ losartan (COZAAR) 100 MG tablet - Take 1 tablet (100 mg total) by mouth daily   ~ dulaglutide (Trulicity) 1.5 MG/0.5ML injection - Inject 0.5 mL (1.5 mg total) under the skin every 7 days     Pharmacy:   [facility] [address] [place] [address] [place] - [facility]ARD[facility]     Does the patient have enough for 3 days?   [x] Yes   [] No - Send as HP to POD

## 2025-01-14 RX ORDER — ATORVASTATIN CALCIUM 20 MG/1
20 TABLET, FILM COATED ORAL DAILY
Qty: 90 TABLET | Refills: 1 | Status: SHIPPED | OUTPATIENT
Start: 2025-01-14

## 2025-01-14 RX ORDER — LOSARTAN POTASSIUM 100 MG/1
100 TABLET ORAL DAILY
Qty: 90 TABLET | Refills: 1 | Status: SHIPPED | OUTPATIENT
Start: 2025-01-14

## 2025-01-14 RX ORDER — METOPROLOL SUCCINATE 25 MG/1
TABLET, EXTENDED RELEASE ORAL
Qty: 135 TABLET | Refills: 1 | Status: SHIPPED | OUTPATIENT
Start: 2025-01-14

## 2025-01-14 RX ORDER — MONTELUKAST SODIUM 10 MG/1
10 TABLET ORAL
Qty: 90 TABLET | Refills: 1 | Status: SHIPPED | OUTPATIENT
Start: 2025-01-14 | End: 2025-07-13

## 2025-01-14 RX ORDER — AMLODIPINE BESYLATE 5 MG/1
5 TABLET ORAL DAILY
Qty: 90 TABLET | Refills: 1 | Status: SHIPPED | OUTPATIENT
Start: 2025-01-14

## 2025-01-14 RX ORDER — METFORMIN HYDROCHLORIDE 500 MG/1
TABLET, EXTENDED RELEASE ORAL
Qty: 180 TABLET | Refills: 1 | Status: SHIPPED | OUTPATIENT
Start: 2025-01-14

## 2025-01-14 RX ORDER — OMEGA-3-ACID ETHYL ESTERS 1 G/1
1 CAPSULE, LIQUID FILLED ORAL 2 TIMES DAILY
Qty: 180 CAPSULE | Refills: 1 | Status: SHIPPED | OUTPATIENT
Start: 2025-01-14

## 2025-01-29 LAB
BUN SERPL-MCNC: 12 MG/DL (ref 8–27)
BUN/CREAT SERPL: 10 (ref 10–24)
CALCIUM SERPL-MCNC: 9.9 MG/DL (ref 8.6–10.2)
CHLORIDE SERPL-SCNC: 103 MMOL/L (ref 96–106)
CO2 SERPL-SCNC: 24 MMOL/L (ref 20–29)
CREAT SERPL-MCNC: 1.15 MG/DL (ref 0.76–1.27)
EGFR: 71 ML/MIN/1.73
GLUCOSE SERPL-MCNC: 113 MG/DL (ref 70–99)
POTASSIUM SERPL-SCNC: 4.9 MMOL/L (ref 3.5–5.2)
PSA SERPL-MCNC: 3.8 NG/ML (ref 0–4)
SODIUM SERPL-SCNC: 143 MMOL/L (ref 134–144)

## 2025-02-07 ENCOUNTER — HOSPITAL ENCOUNTER (OUTPATIENT)
Facility: HOSPITAL | Age: 65
Setting detail: OUTPATIENT SURGERY
Discharge: HOME | End: 2025-02-07
Attending: STUDENT IN AN ORGANIZED HEALTH CARE EDUCATION/TRAINING PROGRAM | Admitting: STUDENT IN AN ORGANIZED HEALTH CARE EDUCATION/TRAINING PROGRAM
Payer: MEDICARE

## 2025-02-07 VITALS
OXYGEN SATURATION: 98 % | TEMPERATURE: 97.5 F | SYSTOLIC BLOOD PRESSURE: 163 MMHG | HEIGHT: 69 IN | HEART RATE: 63 BPM | DIASTOLIC BLOOD PRESSURE: 81 MMHG | BODY MASS INDEX: 31.09 KG/M2 | RESPIRATION RATE: 16 BRPM | WEIGHT: 209.88 LBS

## 2025-02-07 DIAGNOSIS — L91.8 SKIN TAG: ICD-10-CM

## 2025-02-07 DIAGNOSIS — L72.3 SEBACEOUS CYST: ICD-10-CM

## 2025-02-07 DIAGNOSIS — L98.9 SKIN LESION OF BACK: ICD-10-CM

## 2025-02-07 LAB — GLUCOSE SERPL-MCNC: 123 MG/DL (ref 65–140)

## 2025-02-07 PROCEDURE — 11200 RMVL SKIN TAGS UP TO&INC 15: CPT | Performed by: STUDENT IN AN ORGANIZED HEALTH CARE EDUCATION/TRAINING PROGRAM

## 2025-02-07 PROCEDURE — 11403 EXC TR-EXT B9+MARG 2.1-3CM: CPT | Performed by: STUDENT IN AN ORGANIZED HEALTH CARE EDUCATION/TRAINING PROGRAM

## 2025-02-07 PROCEDURE — 82948 REAGENT STRIP/BLOOD GLUCOSE: CPT

## 2025-02-07 PROCEDURE — 12034 INTMD RPR S/TR/EXT 7.6-12.5: CPT | Performed by: STUDENT IN AN ORGANIZED HEALTH CARE EDUCATION/TRAINING PROGRAM

## 2025-02-07 PROCEDURE — 88304 TISSUE EXAM BY PATHOLOGIST: CPT | Performed by: PATHOLOGY

## 2025-02-07 PROCEDURE — 88305 TISSUE EXAM BY PATHOLOGIST: CPT | Performed by: PATHOLOGY

## 2025-02-07 PROCEDURE — 11402 EXC TR-EXT B9+MARG 1.1-2 CM: CPT | Performed by: STUDENT IN AN ORGANIZED HEALTH CARE EDUCATION/TRAINING PROGRAM

## 2025-02-07 PROCEDURE — NC001 PR NO CHARGE: Performed by: STUDENT IN AN ORGANIZED HEALTH CARE EDUCATION/TRAINING PROGRAM

## 2025-02-07 PROCEDURE — 11401 EXC TR-EXT B9+MARG 0.6-1 CM: CPT | Performed by: STUDENT IN AN ORGANIZED HEALTH CARE EDUCATION/TRAINING PROGRAM

## 2025-02-07 RX ORDER — CEFAZOLIN SODIUM 2 G/50ML
2000 SOLUTION INTRAVENOUS ONCE
Status: COMPLETED | OUTPATIENT
Start: 2025-02-07 | End: 2025-02-07

## 2025-02-07 RX ORDER — GINSENG 100 MG
CAPSULE ORAL AS NEEDED
Status: DISCONTINUED | OUTPATIENT
Start: 2025-02-07 | End: 2025-02-07 | Stop reason: HOSPADM

## 2025-02-07 RX ORDER — CHLORHEXIDINE GLUCONATE 40 MG/ML
SOLUTION TOPICAL DAILY PRN
Status: DISCONTINUED | OUTPATIENT
Start: 2025-02-07 | End: 2025-02-07 | Stop reason: HOSPADM

## 2025-02-07 RX ORDER — ACETAMINOPHEN 325 MG/1
650 TABLET ORAL EVERY 6 HOURS PRN
Status: DISCONTINUED | OUTPATIENT
Start: 2025-02-07 | End: 2025-02-07 | Stop reason: HOSPADM

## 2025-02-07 RX ORDER — SODIUM CHLORIDE, SODIUM LACTATE, POTASSIUM CHLORIDE, CALCIUM CHLORIDE 600; 310; 30; 20 MG/100ML; MG/100ML; MG/100ML; MG/100ML
125 INJECTION, SOLUTION INTRAVENOUS CONTINUOUS
Status: DISCONTINUED | OUTPATIENT
Start: 2025-02-07 | End: 2025-02-07 | Stop reason: HOSPADM

## 2025-02-07 RX ADMIN — CEFAZOLIN SODIUM 2000 MG: 2 SOLUTION INTRAVENOUS at 07:54

## 2025-02-07 RX ADMIN — SODIUM CHLORIDE, SODIUM LACTATE, POTASSIUM CHLORIDE, AND CALCIUM CHLORIDE 125 ML/HR: .6; .31; .03; .02 INJECTION, SOLUTION INTRAVENOUS at 07:40

## 2025-02-07 NOTE — OP NOTE
OPERATIVE REPORT  PATIENT NAME: Alvaro Steel    :  1960  MRN: [id number]  Pt Location: [facility] OR ROOM 02    SURGERY DATE: 2025    Surgeons and Role:     * Randolph Santoro,  - Primary    Preop Diagnosis:  Sebaceous cyst [L72.3]  Skin lesion of back [L98.9]  Skin tag [L91.8]    Post-Op Diagnosis Codes:     * Sebaceous cyst [L72.3]     * Skin lesion of back [L98.9]     * Skin tag [L91.8]    Procedure(s):  EXCISION  BIOPSY LESION/MASS BACK x 3  EXCISION BIOPSY LESION /MASS FACIAL/NECK - Posterior Skin Tag  Bilateral - EXCISION BIOPSY TISSUE LESION/MASS UPPER EXTREMITY - B/L Axilla Skin Tags    Specimen(s):  ID Type Source Tests Collected by Time Destination   1 : LOWER BACK MASS Tissue Soft Tissue, Other TISSUE EXAM Randolph Santoro, DO 2025 0821    2 : MIDDLE BACK MASS Tissue Soft Tissue, Other TISSUE EXAM Randolph Santoro, DO 2025 0825    3 : RIGHT BACK MASS Tissue Soft Tissue, Other TISSUE EXAM Randolph Santoro, DO 2025 0831    4 : POSTERIOR NECK SKIN TAG Tissue Skin, Cyst/Tag/Debridement TISSUE EXAM Randolph Santoro, DO 2025 0833    5 : RIGHT AXILLA SKIN TAGS Tissue Skin, Cyst/Tag/Debridement TISSUE EXAM Randolph Santoro, DO 2025 0847    6 : LEFT AXILLA SKIN TAGS Tissue Skin, Cyst/Tag/Debridement TISSUE EXAM Randolph Santoro, DO 2025 0848        Estimated Blood Loss:   Minimal    Drains:  * No LDAs found *    Anesthesia Type:   Local    Operative Indications:  Sebaceous cyst [L72.3]  Skin lesion of back [L98.9]  Skin tag [L91.8]    Operative Findings:  --Lower back mass consistent with sebaceous cyst specimen size 2.5 x 1 x 1.5 cm, incision closure 3 cm  Specimen was excised with grossly negative margins, no measurable margins were taken  Dissection was carried down in the subcutaneous tissue and do not go any deeper  --Middle back mass consists of 2 small skin lesions (both small skin lesions are 3 mm x 3 mm) with total size of excised skin 3 x 1 x  1 cm, incision closure 3.5 cm  Specimens were excised with smallest margins of 5 mm  Dissection was carried down into the subcutaneous tissue and did not go any deeper  --Right back mass specimen size 1 x 0.5 x 0.5 cm, incision closure 1.5 cm  Specimen was excised with grossly negative margins, no measurable margins were taken  Dissection was carried down to the subcutaneous tissue and did not go any deeper  --All skin tags were less than 3 mm in size and were removed with electrocautery, dissection was carried to the level of the subcutaneous tissue but did not go into it  Posterior neck skin tag x 1  Right axilla skin tag x 6  Left axilla skin tag x 2    Complications:   None    Procedure and Technique:  The patient was seen again in Preoperative Holding.  All the risks, benefits, complications, treatment options, and expected outcomes were discussed with the patient and family at length. All questions were answered to satisfaction. There was concurrence with the proposed plan and informed consent was obtained. The site of surgery was properly noted/marked. The patient was taken to Operating Room, identified, and the procedure verified as excision of back mass x 3, excision of skin tag of posterior neck and bilateral axilla.    The patient was placed in the left lateral decubitus position on the stretcher.  The patient had received preoperative antibiotics and SCDs placed on the bilateral lower extremities.    The back was then prepped and draped in the usual sterile fashion using ChloraPrep.  A Time-Out was then performed with all involved present confirming the correct patient, procedure, antibiotics, and any additional concerns.     The 3 areas on the back were anesthetized using 1% lidocaine with epinephrine.  Elliptical areas were marked around the 3 lesions.  Each lesion was excised as followed.  Using #15 blade an elliptical incision was made around the lesion.  Dissection was then carried down to the  subcutaneous tissue.  The lesions were excised in their entirety.  The cavity was inspected and hemostasis was ensured electrocautery.  The cavity was then irrigated.  Interrupted 3-0 Vicryl was used to approximate the deep dermal tissue.  Interrupted 2-0 nylon was used to close the skin.  The incisions were cleansed and dried and bacitracin, Telfa, gauze, Tegaderm was placed as dressing.  The posterior neck skin tag was anesthetized using 1% lidocaine with epinephrine.  Using electrocautery the skin tag was excised at its base.  Hemostasis was noted.  Bacitracin and a Band-Aid was placed over the wound.    The patient was then placed in supine position on the stretcher.  The bilateral axilla were then prepped in the usual sterile fashion with ChloraPrep.  The areas underneath the skin tags were anesthetized using 1% lidocaine with epinephrine.  All the skin tags were then excised using electrocautery at its base.  Hemostasis was noted.  Bacitracin was placed over the wounds.    All instrument, sponge, and needle counts were noted to be correct at the conclusion of the case.     I was present for the entire procedure. and A qualified resident physician was not available.    Patient Disposition:  APU and hemodynamically stable             SIGNATURE: Randolph Santoro,   DATE: February 7, 2025  TIME: 9:06 AM

## 2025-02-07 NOTE — H&P
H&P Exam - General Surgery   Alvaro Steel 64 y.o. male MRN: [id number]  Unit/Bed#: OR POOL Encounter: [id number]    Assessment & Plan     Alvaro Steel is a 64 y.o. male    Sebaceous cyst  64-year-old male with sebaceous cyst of back  -Patient denies any changes  -Denies any pain or drainage  -Plan for excision of back sebaceous cyst under local anesthesia     Skin lesion of back  64-year-old male with skin lesion of back x 2  -Patient denies any changes  -Denies any pain  -Plan for excision of skin lesion x 2 of upper back     Skin tag  64-year-old male with skin tags of posterior neck, bilateral axilla  -Patient with skin tags of posterior neck and bilateral axilla  -Denies any changes  -Plan for removal of posterior neck skin tag along with bilateral axilla skin tags    History of Present Illness     HPI:  Alvaro Steel is a 64 y.o. male who presents for excision of sebaceous cyst of back, skin lesion of back, multiple skin tags.  He is overall feeling well.  He denies any changes.    Review of Systems  Constitutional:  Negative for chills, fatigue and fever.   HENT:  Negative for congestion, hearing loss, rhinorrhea and sore throat.    Eyes:  Negative for pain and discharge.   Respiratory:  Negative for cough, chest tightness and shortness of breath.    Cardiovascular:  Negative for chest pain and palpitations.   Gastrointestinal:  Negative for abdominal pain, constipation, diarrhea, nausea and vomiting.   Endocrine: Negative for cold intolerance and heat intolerance.   Genitourinary:  Negative for difficulty urinating and dysuria.   Musculoskeletal:  Negative for back pain and neck pain.   Skin:  Negative for color change and rash.        Positive sebaceous cyst of back, skin lesion of back, skin tags   Allergic/Immunologic: Negative for environmental allergies and food allergies.   Neurological:  Negative for seizures and headaches.   Hematological:  Does not bruise/bleed easily.  "  Psychiatric/Behavioral:  Negative for confusion and hallucinations.    Historical Information   Past Medical History:   Diagnosis Date    Asthma     Benign prostatic hyperplasia     Chronic kidney disease     Diabetes mellitus (HCC)     Erectile dysfunction     Family history of prostate cancer     Hypertension     Kidney stone     Mixed hyperlipidemia     Prostate cancer (HCC)      Past Surgical History:   Procedure Laterality Date    CHOLECYSTECTOMY      COLONOSCOPY      EYE SURGERY Left     Detached retna    CT BX PROSTATE STRTCTC SATURATION SAMPLING IMG GID N/A 08/17/2023    Procedure: TRANSPERINEAL MRI FUSION BIOPSY PROSTATE;  Surgeon: Destin Ambrose MD;  Location: [facility] MAIN OR;  Service: Urology    PROSTATE BIOPSY      ROTATOR CUFF REPAIR Bilateral     SHOULDER SURGERY Bilateral 2015    VASECTOMY  1994     Social History   Social History     Substance and Sexual Activity   Alcohol Use Yes    Alcohol/week: 1.0 standard drink of alcohol    Types: 1 Standard drinks or equivalent per week    Comment: occa     Social History     Substance and Sexual Activity   Drug Use Not Currently    Frequency: 1.0 times per week    Types: Marijuana    Comment: Vape pen     Social History     Tobacco Use   Smoking Status Never    Passive exposure: Past   Smokeless Tobacco Never   Tobacco Comments    I dont smoke     Family History: Family history non-contributory    Meds/Allergies   all medications and allergies reviewed  No Known Allergies    Objective   First Vitals:   Blood Pressure: (!) 182/85 (02/07/25 0719)  Pulse: 67 (02/07/25 0719)  Temperature: 98.2 °F (36.8 °C) (02/07/25 0719)  Temp Source: Temporal (02/07/25 0719)  Respirations: 22 (02/07/25 0719)  Height: 5' 9\" (175.3 cm) (02/07/25 0719)  Weight - Scale: 95.2 kg (209 lb 14.1 oz) (02/07/25 0719)  SpO2: 96 % (02/07/25 0719)    Current Vitals:   Blood Pressure: (!) 182/85 (02/07/25 0719)  Pulse: 67 (02/07/25 0719)  Temperature: 98.2 °F (36.8 °C) (02/07/25 " "0719)  Temp Source: Temporal (02/07/25 0719)  Respirations: 22 (02/07/25 0719)  Height: 5' 9\" (175.3 cm) (02/07/25 0719)  Weight - Scale: 95.2 kg (209 lb 14.1 oz) (02/07/25 0719)  SpO2: 96 % (02/07/25 0719)    No intake or output data in the 24 hours ending 02/07/25 0735    Invasive Devices       Peripheral Intravenous Line  Duration             Peripheral IV 02/07/25 Left Hand <1 day                    Physical Exam  Constitutional:       Appearance: Normal appearance.   HENT:      Head: Normocephalic and atraumatic.      Nose: Nose normal.   Eyes:      General: No scleral icterus.     Conjunctiva/sclera: Conjunctivae normal.   Cardiovascular:      Rate and Rhythm: Normal rate  Pulmonary:      Effort: Pulmonary effort is normal.   Abdominal:      General: There is no distension.   Musculoskeletal:         General: No signs of injury.   Skin:     General: Skin is warm.      Coloration: Skin is not jaundiced.      Comments: Skin tags bilateral axilla and posterior neck, sebaceous cyst middle of back roughly 1 x 1 cm, 2 small skin lesions next to each other roughly 3 mm x 3 mm each on the back  Neurological:      General: No focal deficit present.      Mental Status: He is alert and oriented to person, place, and time.   Psychiatric:         Mood and Affect: Mood normal.         Behavior: Behavior normal.     Lab Results: I have personally reviewed pertinent lab results.    Recent Results (from the past 36 hours)   Fingerstick Glucose (POCT)    Collection Time: 02/07/25  7:28 AM   Result Value Ref Range    POC Glucose 123 65 - 140 mg/dl     "

## 2025-02-07 NOTE — DISCHARGE INSTR - AVS FIRST PAGE
Your incisions are covered with stitches, 4 x 4 gauze, Tegaderm.  In 2 days you may remove your dressing, the stitches will remain on your skin but the 4 x 4 gauze and Tegaderm can be removed.  The stitches will be removed in the office.  After your dressings are removed you may shower.  Do not soak your incisions including tub baths or swimming.  You may shower and let water and soap wash over incisions. Do not scrub your incisions.  You may resume your normal diet as tolerated.  You may take Tylenol over-the-counter as needed for pain, follow instructions on the bottle.  You may take Ibuprofen over-the-counter as needed for pain, follow instructions on the bottle.  You may alternate Tylenol and Ibuprofen if needed, but do not take at the same time.  Follow-up with your Surgeon in 2 weeks, call the office for an appointment.  You will receive a survey via Email in regards to your same day surgery experience. Please fill out the survey to let us know how we did with your care.

## 2025-02-11 PROCEDURE — 88305 TISSUE EXAM BY PATHOLOGIST: CPT | Performed by: PATHOLOGY

## 2025-02-11 PROCEDURE — 88304 TISSUE EXAM BY PATHOLOGIST: CPT | Performed by: PATHOLOGY

## 2025-02-15 ENCOUNTER — HOSPITAL ENCOUNTER (OUTPATIENT)
Facility: MEDICAL CENTER | Age: 65
Discharge: HOME | End: 2025-02-15
Payer: MEDICARE

## 2025-02-15 DIAGNOSIS — R97.20 ELEVATED PSA: ICD-10-CM

## 2025-02-15 PROCEDURE — 72197 MRI PELVIS W/O & W/DYE: CPT

## 2025-02-15 PROCEDURE — A9585 GADOBUTROL INJECTION: HCPCS | Performed by: PHYSICIAN ASSISTANT

## 2025-02-15 PROCEDURE — 76377 3D RENDER W/INTRP POSTPROCES: CPT

## 2025-02-15 RX ORDER — GADOBUTROL 604.72 MG/ML
9 INJECTION INTRAVENOUS
Status: COMPLETED | OUTPATIENT
Start: 2025-02-15 | End: 2025-02-15

## 2025-02-15 RX ADMIN — GADOBUTROL 9 ML: 604.72 INJECTION INTRAVENOUS at 14:28

## 2025-02-18 ENCOUNTER — OFFICE VISIT (OUTPATIENT)
Dept: SURGERY | Facility: CLINIC | Age: 65
End: 2025-02-18

## 2025-02-18 VITALS
BODY MASS INDEX: 31.4 KG/M2 | SYSTOLIC BLOOD PRESSURE: 124 MMHG | DIASTOLIC BLOOD PRESSURE: 86 MMHG | TEMPERATURE: 97.7 F | HEART RATE: 60 BPM | OXYGEN SATURATION: 98 % | WEIGHT: 212 LBS | HEIGHT: 69 IN

## 2025-02-18 DIAGNOSIS — L72.3 SEBACEOUS CYST: Primary | ICD-10-CM

## 2025-02-18 PROCEDURE — 99024 POSTOP FOLLOW-UP VISIT: CPT | Performed by: STUDENT IN AN ORGANIZED HEALTH CARE EDUCATION/TRAINING PROGRAM

## 2025-02-18 RX ORDER — POLYMYXIN B SULFATE AND TRIMETHOPRIM 1; 10000 MG/ML; [USP'U]/ML
SOLUTION OPHTHALMIC
COMMUNITY
Start: 2024-12-06

## 2025-02-18 RX ORDER — PREDNISOLONE ACETATE 10 MG/ML
SUSPENSION/ DROPS OPHTHALMIC
COMMUNITY
Start: 2024-12-06

## 2025-02-18 NOTE — ASSESSMENT & PLAN NOTE
63-year-old male status post excision of sebaceous cyst of back, skin lesion of back, multiple skin tags on 2/7/2025  -Patient denies any pain  -Notes the stitches are becoming irritating  -Back incision x 3 healing well without erythema induration or drainage, stitches intact; areas of skin tag excision healing well  -Stitches removed  -Pathology report reviewed  -Follow-up in office as needed    Final Diagnosis   A. Skin, lower back:   EPIDERMOID CYST        B. Skin, middle back:   INTRADERMAL MELANOCYTIC NEVUS, IRRITATED     Note: There is a zone of uninvolved tissue on all margins, and the lesion appears completely excised.        C. Skin, right back:   DILATED PORE OF TALI     D. Skin, posterior neck:   FIBROEPITHELIAL POLYP (ACROCHORDON), INFLAMED        E. Skin, right axilla:   FIBROEPITHELIAL POLYPS (ACROCHORDONS)        F. Skin, left axilla:   FIBROEPITHELIAL POLYP (ACROCHORDON), INFLAMED     Note: The two pieces of tissue show similar histologic changes.     I reviewed the pathology report and discussed the findings with the patient and their accompanying family or caregiver, if present. All questions were answered to satisfaction and the patient along with family or caregiver, if present, voiced understanding.

## 2025-02-18 NOTE — PROGRESS NOTES
Name: Alvaro Steel      : 1960      MRN: [id number]  Encounter Provider: Randolph Santoro DO  Encounter Date: 2025   Encounter department: [facility]  :  Assessment & Plan  Sebaceous cyst  63-year-old male status post excision of sebaceous cyst of back, skin lesion of back, multiple skin tags on 2025  -Patient denies any pain  -Notes the stitches are becoming irritating  -Back incision x 3 healing well without erythema induration or drainage, stitches intact; areas of skin tag excision healing well  -Stitches removed  -Pathology report reviewed  -Follow-up in office as needed    Final Diagnosis   A. Skin, lower back:   EPIDERMOID CYST        B. Skin, middle back:   INTRADERMAL MELANOCYTIC NEVUS, IRRITATED     Note: There is a zone of uninvolved tissue on all margins, and the lesion appears completely excised.        C. Skin, right back:   DILATED PORE OF TALI     D. Skin, posterior neck:   FIBROEPITHELIAL POLYP (ACROCHORDON), INFLAMED        E. Skin, right axilla:   FIBROEPITHELIAL POLYPS (ACROCHORDONS)        F. Skin, left axilla:   FIBROEPITHELIAL POLYP (ACROCHORDON), INFLAMED     Note: The two pieces of tissue show similar histologic changes.     I reviewed the pathology report and discussed the findings with the patient and their accompanying family or caregiver, if present. All questions were answered to satisfaction and the patient along with family or caregiver, if present, voiced understanding.             History of Present Illness   HPI  Alvaro Steel is a 64 y.o. male who presents for evaluation status post excision of sebaceous cyst of back, skin lesion of back, multiple skin tags.  He denies any pain.  He notes that the stitches are irritating.  History obtained from: patient    Review of Systems   Constitutional:  Negative for chills, fatigue and fever.   HENT:  Negative for congestion, hearing loss, rhinorrhea and sore throat.    Eyes:  Negative  for pain and discharge.   Respiratory:  Negative for cough, chest tightness and shortness of breath.    Cardiovascular:  Negative for chest pain and palpitations.   Gastrointestinal:  Negative for abdominal pain, constipation, diarrhea, nausea and vomiting.   Endocrine: Negative for cold intolerance and heat intolerance.   Genitourinary:  Negative for difficulty urinating and dysuria.   Musculoskeletal:  Negative for back pain and neck pain.   Skin:  Negative for color change and rash.   Allergic/Immunologic: Negative for environmental allergies and food allergies.   Neurological:  Negative for seizures and headaches.   Hematological:  Does not bruise/bleed easily.   Psychiatric/Behavioral:  Negative for confusion and hallucinations.      Medical History Reviewed by provider this encounter:  Tobacco  Allergies  Meds  Problems  Med Hx  Surg Hx  Fam Hx     .  Past Medical History   Past Medical History:   Diagnosis Date    Asthma     Benign prostatic hyperplasia     Chronic kidney disease     Diabetes mellitus (HCC)     Erectile dysfunction     Family history of prostate cancer     Hypertension     Kidney stone     Mixed hyperlipidemia     Prostate cancer (HCC)      Past Surgical History:   Procedure Laterality Date    CHOLECYSTECTOMY      COLONOSCOPY      EYE SURGERY Left     Detached retna    GA BX PROSTATE STRTCTC SATURATION SAMPLING IMG GID N/A 08/17/2023    Procedure: TRANSPERINEAL MRI FUSION BIOPSY PROSTATE;  Surgeon: Destin Ambrose MD;  [facility] MA[facility];  Service: Urology    GA EXC TUMOR SOFT TISSUE NECK/ANT THORAX SUBQ <3CM N/A 2/7/2025    Procedure: EXCISION BIOPSY LESION /MASS FACIAL/NECK - Posterior Skin Tag;  Surgeon: Randolph Santoro DO;  [facility] MAIN OR;  Service: General    GA EXCISION HIDRADENITIS AXILLARY SMPL/INTRM RPR Bilateral 2/7/2025    Procedure: EXCISION BIOPSY TISSUE LESION/MASS UPPER EXTREMITY - B/L Axilla Skin Tags;  Surgeon: Randolph Santoro DO;   [facility];  Service: General    MO EXCISION TUMOR SOFT TISSUE BACK/FLANK SUBQ <3CM N/A 2/7/2025    Procedure: EXCISION  BIOPSY LESION/MASS BACK x 3;  Surgeon: Randolph Santoro DO;  Location[facility]IN [facility];  Service: General    PROSTATE BIOPSY      ROTATOR CUFF REPAIR Bilateral     SHOULDER SURGERY Bilateral 2015    VASECTOMY  1994     Family History   Problem Relation Age of Onset    Hypertension Mother     Diabetes Mother     Cancer Father     Hypertension Father     Prostate cancer Father     Hypertension Brother       reports that he has never smoked. He has been exposed to tobacco smoke. He has never used smokeless tobacco. He reports current alcohol use of about 1.0 standard drink of alcohol per week. He reports that he does not currently use drugs after having used the following drugs: Marijuana. Frequency: 1.00 time per week.  Current Outpatient Medications   Medication Instructions    amLODIPine (NORVASC) 5 mg, Oral, Daily    atorvastatin (LIPITOR) 20 mg, Oral, Daily    Dulaglutide 1.5 mg, Subcutaneous, Every 7 days    glucose blood test strip Test once daily or as directed    Lancets (onetouch ultrasoft) lancets Test once daily or as directed    losartan (COZAAR) 100 mg, Oral, Daily    metFORMIN (GLUCOPHAGE-XR) 500 mg 24 hr tablet 1 tab po bid    metoprolol succinate (TOPROL-XL) 25 mg 24 hr tablet TAKE 1 & 1/2 (ONE & ONE-HALF) TABLETS BY MOUTH AT BEDTIME    montelukast (SINGULAIR) 10 mg, Oral, Daily at bedtime    omega-3-acid ethyl esters (LOVAZA) 1 g, Oral, 2 times daily    polymyxin b-trimethoprim (POLYTRIM) ophthalmic solution instill 1 drop into left eye 4 times daily    prednisoLONE acetate (PRED FORTE) 1 % ophthalmic suspension instill 1 drop into left eye 4 times daily    sildenafil (REVATIO) 20 mg tablet TAKE 2 TABLETS BY MOUTH ONCE DAILY 1  HOUR  BEFORE  INTERCOURSE.   No Known Allergies   Current Outpatient Medications on File Prior to Visit   Medication Sig Dispense Refill     amLODIPine (NORVASC) 5 mg tablet Take 1 tablet (5 mg total) by mouth daily 90 tablet 1    atorvastatin (LIPITOR) 20 mg tablet Take 1 tablet (20 mg total) by mouth daily 90 tablet 1    Dulaglutide 1.5 MG/0.5ML SOAJ Inject 1.5 mg under the skin every 7 days 6 mL 1    glucose blood test strip Test once daily or as directed 100 each 2    Lancets (onetouch ultrasoft) lancets Test once daily or as directed 100 each 3    losartan (COZAAR) 100 MG tablet Take 1 tablet (100 mg total) by mouth daily 90 tablet 1    metFORMIN (GLUCOPHAGE-XR) 500 mg 24 hr tablet 1 tab po bid 180 tablet 1    metoprolol succinate (TOPROL-XL) 25 mg 24 hr tablet TAKE 1 & 1/2 (ONE & ONE-HALF) TABLETS BY MOUTH AT BEDTIME 135 tablet 1    montelukast (SINGULAIR) 10 mg tablet Take 1 tablet (10 mg total) by mouth daily at bedtime 90 tablet 1    omega-3-acid ethyl esters (LOVAZA) 1 g capsule Take 1 capsule (1 g total) by mouth 2 (two) times a day 180 capsule 1    polymyxin b-trimethoprim (POLYTRIM) ophthalmic solution instill 1 drop into left eye 4 times daily      prednisoLONE acetate (PRED FORTE) 1 % ophthalmic suspension instill 1 drop into left eye 4 times daily      sildenafil (REVATIO) 20 mg tablet TAKE 2 TABLETS BY MOUTH ONCE DAILY 1  HOUR  BEFORE  INTERCOURSE. 60 tablet 1     No current facility-administered medications on file prior to visit.      Social History     Tobacco Use    Smoking status: Never     Passive exposure: Past    Smokeless tobacco: Never    Tobacco comments:     I dont smoke   Vaping Use    Vaping status: Some Days    Substances: THC   Substance and Sexual Activity    Alcohol use: Yes     Alcohol/week: 1.0 standard drink of alcohol     Types: 1 Standard drinks or equivalent per week     Comment: occa    Drug use: Not Currently     Frequency: 1.0 times per week     Types: Marijuana     Comment: Vape pen    Sexual activity: Yes     Partners: Female     Birth control/protection: None        Objective   /86 (Patient Position:  "Sitting, Cuff Size: Standard)   Pulse 60   Temp 97.7 °F (36.5 °C) (Temporal)   Ht 5' 9\" (1.753 m)   Wt 96.2 kg (212 lb)   SpO2 98%   BMI 31.31 kg/m²      Physical Exam  Constitutional:       Appearance: Normal appearance.   HENT:      Head: Normocephalic and atraumatic.      Nose: Nose normal.   Eyes:      General: No scleral icterus.     Conjunctiva/sclera: Conjunctivae normal.   Cardiovascular:      Rate and Rhythm: Normal rate.   Pulmonary:      Effort: Pulmonary effort is normal.   Musculoskeletal:         General: No signs of injury.   Skin:     General: Skin is warm.      Coloration: Skin is not jaundiced.      Comments: Back incision x 3 healing well without erythema induration or drainage, stitches intact; areas of skin tag excision healing well   Neurological:      General: No focal deficit present.      Mental Status: He is alert and oriented to person, place, and time.   Psychiatric:         Mood and Affect: Mood normal.         Behavior: Behavior normal.           "

## 2025-02-27 NOTE — PROGRESS NOTES
Name: Alvaro Steel      : 1960      MRN: [id number]  Encounter Provider: Cynthia Moss PA-C  Encounter Date: 2025   Encounter department: [facility] [place]  :  Assessment & Plan  Prostate cancer (HCC)  - cT1c  - grade group 1 ([place] 3+3=6 disease, 4 of 12 total positive cores-left medial base, left medial base, right lateral apex, right medial apex).  Maximum core positivity: 10%  - pre treatment PSA:  5.0  - Multiparametric MRI: 1.2 cm lesion in the left base corresponding to site of prior biopsy, no extracapsular extension seminal vesicle invasion  -Repeat biopsy (transperineal fusion, 2023)- Y[place] 6 disease, lesion present from the left base shows BPH only, 6 total positive cores of [place] 6 disease, maximum core positivity: 15%  - Repeat prostate MRI from 2/15/25 - PIRADS 2   - Most recent PSA from 25 was 3.8  - ELENA negative at last visit.   - Discussed active surveillance protocol and recommendations for repeat prostate biopsy generally every 2 years. Will plan for repeat TRUS prostate biopsy in August-2025. Repeat PSA prior to this visit as well.     Orders:    PSA Total, Diagnostic; Future    Type II diabetes mellitus with peripheral circulatory disorder (HCC)    Lab Results   Component Value Date    HGBA1C 7.0 (H) 2024                History of Present Illness   Alvaro Steel is a 64 y.o. male who presents for follow up of low risk prostate cancer on active surveillance. He denies any new urinary complaints or issues.     Review of Systems   Constitutional:  Negative for chills and fever.   Respiratory:  Negative for shortness of breath.    Cardiovascular:  Negative for chest pain.   Gastrointestinal:  Negative for abdominal pain.   Genitourinary:  Negative for difficulty urinating, dysuria, flank pain, frequency, hematuria and urgency.   Neurological:  Negative for dizziness.          Objective   There were no vitals taken for  this visit.    Physical Exam  Constitutional:       Appearance: Normal appearance.   HENT:      Head: Normocephalic and atraumatic.      Right Ear: External ear normal.      Left Ear: External ear normal.      Nose: Nose normal.   Eyes:      General: No scleral icterus.     Conjunctiva/sclera: Conjunctivae normal.   Cardiovascular:      Pulses: Normal pulses.   Pulmonary:      Effort: Pulmonary effort is normal.   Musculoskeletal:         General: Normal range of motion.      Cervical back: Normal range of motion.   Neurological:      General: No focal deficit present.      Mental Status: He is alert and oriented to person, place, and time.   Psychiatric:         Mood and Affect: Mood normal.         Behavior: Behavior normal.         Thought Content: Thought content normal.         Judgment: Judgment normal.          Results   Lab Results   Component Value Date    PSA 3.8 01/28/2025    PSA 3.8 08/27/2024    PSA 3.2 02/21/2024     Lab Results   Component Value Date    CALCIUM 8.8 03/10/2019    K 4.9 01/28/2025    CO2 24 01/28/2025     01/28/2025    BUN 12 01/28/2025    CREATININE 1.15 01/28/2025     Lab Results   Component Value Date    WBC 6.6 11/08/2024    HGB 14.8 11/08/2024    HCT 44.7 11/08/2024    MCV 92 11/08/2024     11/08/2024       Office Urine Dip  No results found for this or any previous visit (from the past hour).

## 2025-02-27 NOTE — ASSESSMENT & PLAN NOTE
- cT1c  - grade group 1 (Yeni 3+3=6 disease, 4 of 12 total positive cores-left medial base, left medial base, right lateral apex, right medial apex).  Maximum core positivity: 10%  - pre treatment PSA:  5.0  - Multiparametric MRI: 1.2 cm lesion in the left base corresponding to site of prior biopsy, no extracapsular extension seminal vesicle invasion  -Repeat biopsy (transperineal fusion, August 2023)- [place] 6 disease, lesion present from the left base shows BPH only, 6 total positive cores of [place] 6 disease, maximum core positivity: 15%  - Repeat prostate MRI from 2/15/25 - PIRADS 2   - Most recent PSA from 1/28/25 was 3.8  - ELENA negative at last visit.   - Discussed active surveillance protocol and recommendations for repeat prostate biopsy generally every 2 years. Will plan for repeat TRUS prostate biopsy in August-September 2025. Repeat PSA prior to this visit as well.     Orders:    PSA Total, Diagnostic; Future

## 2025-02-28 ENCOUNTER — OFFICE VISIT (OUTPATIENT)
Dept: UROLOGY | Facility: CLINIC | Age: 65
End: 2025-02-28
Payer: MEDICARE

## 2025-02-28 ENCOUNTER — TELEPHONE (OUTPATIENT)
Dept: UROLOGY | Facility: CLINIC | Age: 65
End: 2025-02-28

## 2025-02-28 VITALS
OXYGEN SATURATION: 97 % | RESPIRATION RATE: 18 BRPM | HEIGHT: 69 IN | HEART RATE: 75 BPM | WEIGHT: 207.6 LBS | TEMPERATURE: 97.5 F | SYSTOLIC BLOOD PRESSURE: 138 MMHG | BODY MASS INDEX: 30.75 KG/M2 | DIASTOLIC BLOOD PRESSURE: 74 MMHG

## 2025-02-28 DIAGNOSIS — E11.51 TYPE II DIABETES MELLITUS WITH PERIPHERAL CIRCULATORY DISORDER (HCC): ICD-10-CM

## 2025-02-28 DIAGNOSIS — C61 PROSTATE CANCER (HCC): Primary | ICD-10-CM

## 2025-02-28 PROCEDURE — 99214 OFFICE O/P EST MOD 30 MIN: CPT | Performed by: PHYSICIAN ASSISTANT

## 2025-02-28 NOTE — TELEPHONE ENCOUNTER
Return in about 6 months (around 8/28/2025) for TRUS prostate biopsy [facility] Dr. Ambrose, PSA ptv.    Patient has been scheduled for Sept 29th   (his preference as he is on vacation for two weeks prior ) However the template is not open for October to schedule the results visit   Will monitor for schedule to open in October to schedule bx result visit with Halie

## 2025-03-04 ENCOUNTER — TELEPHONE (OUTPATIENT)
Age: 65
End: 2025-03-04

## 2025-03-04 NOTE — TELEPHONE ENCOUNTER
Patient called stating he is having cataract surgery next week and [facility] gave him a medical form that needs to be signed by Dr Freire. Patient wanted to know if Dr Freire would be willing to complete the form for him, or will he need to schedule an appt with him? Please call patient back.

## 2025-03-10 ENCOUNTER — CONSULT (OUTPATIENT)
Dept: FAMILY MEDICINE CLINIC | Facility: CLINIC | Age: 65
End: 2025-03-10
Payer: MEDICARE

## 2025-03-10 VITALS
SYSTOLIC BLOOD PRESSURE: 110 MMHG | OXYGEN SATURATION: 97 % | DIASTOLIC BLOOD PRESSURE: 80 MMHG | TEMPERATURE: 97.6 F | HEART RATE: 64 BPM | HEIGHT: 69 IN | WEIGHT: 211.4 LBS | BODY MASS INDEX: 31.31 KG/M2

## 2025-03-10 DIAGNOSIS — Z01.818 PREOP EXAMINATION: Primary | ICD-10-CM

## 2025-03-10 DIAGNOSIS — I10 ESSENTIAL HYPERTENSION: ICD-10-CM

## 2025-03-10 DIAGNOSIS — H26.9 CATARACT OF LEFT EYE, UNSPECIFIED CATARACT TYPE: ICD-10-CM

## 2025-03-10 DIAGNOSIS — E11.69 TYPE 2 DIABETES MELLITUS WITH OTHER SPECIFIED COMPLICATION, WITHOUT LONG-TERM CURRENT USE OF INSULIN (HCC): ICD-10-CM

## 2025-03-10 DIAGNOSIS — Z12.11 SCREEN FOR COLON CANCER: ICD-10-CM

## 2025-03-10 DIAGNOSIS — E78.2 MIXED HYPERLIPIDEMIA: ICD-10-CM

## 2025-03-10 LAB — SL AMB POCT HEMOGLOBIN AIC: 7 (ref ?–6.5)

## 2025-03-10 PROCEDURE — 99214 OFFICE O/P EST MOD 30 MIN: CPT | Performed by: FAMILY MEDICINE

## 2025-03-10 PROCEDURE — G2211 COMPLEX E/M VISIT ADD ON: HCPCS | Performed by: FAMILY MEDICINE

## 2025-03-10 PROCEDURE — 83036 HEMOGLOBIN GLYCOSYLATED A1C: CPT | Performed by: FAMILY MEDICINE

## 2025-03-10 RX ORDER — DIFLUPREDNATE OPHTHALMIC 0.5 MG/ML
EMULSION OPHTHALMIC
COMMUNITY
Start: 2025-03-02

## 2025-03-10 RX ORDER — OFLOXACIN 3 MG/ML
SOLUTION/ DROPS OPHTHALMIC
COMMUNITY
Start: 2025-02-26

## 2025-03-10 NOTE — PROGRESS NOTES
Patient ID: Alvaro Steel is a 64 y.o. male.    HPI: 64 y.o.male is being seen for a preoperative visit L cataract (Dr Thorne)n    Surgical Risk Assessment:    Prior anesthesia:     Adverse Reaction to : Epidural n    General n   Spinal n  Family history of adverse reactions to anesthesia?    Pertinent Past Medical History:    DMII,  HTN, RAD, prostate CA, hyperlipidemia    Exercise Capacity:     Able to walk 4 blocks w/o Sx       y       Able to walk 2 flights of steps w/o Sx   y    Lifestyle Factors:  Tobacco Use:  no        Pack years  Alcohol Use:  social  Illicit Drug Use:  no  No transfusions : [employer]   - no     RADHIKA Risk Factors:  snoring    Personal history of venous thromboembolic disease:    History of Steroid use for >2 weeks within last year?      Family History   Problem Relation Age of Onset   • Hypertension Mother    • Diabetes Mother    • Cancer Father    • Hypertension Father    • Prostate cancer Father    • Hypertension Brother      Social History     Socioeconomic History   • Marital status: /Civil Union     Spouse name: Not on file   • Number of children: Not on file   • Years of education: Not on file   • Highest education level: Not on file   Occupational History   • Not on file   Tobacco Use   • Smoking status: Never     Passive exposure: Past   • Smokeless tobacco: Never   • Tobacco comments:     I dont smoke   Vaping Use   • Vaping status: Some Days   • Substances: THC   Substance and Sexual Activity   • Alcohol use: Yes     Alcohol/week: 1.0 standard drink of alcohol     Types: 1 Standard drinks or equivalent per week     Comment: occa   • Drug use: Not Currently     Frequency: 1.0 times per week     Types: Marijuana     Comment: Vape pen   • Sexual activity: Yes     Partners: Female     Birth control/protection: None   Other Topics Concern   • Not on file   Social History Narrative   • Not on file     Social Drivers of Health     Financial Resource Strain: Not on file   Food  Insecurity: Not on file   Transportation Needs: Not on file   Physical Activity: Inactive (10/5/2020)    Exercise Vital Sign    • Days of Exercise per Week: 0 days    • Minutes of Exercise per Session: 0 min   Stress: No Stress Concern Present (11/1/2022)    [employer] - Occupational Stress Questionnaire    • Feeling of Stress : Only a little   Social Connections: Not on file   Intimate Partner Violence: Not on file   Housing Stability: Not on file     Past Medical History:   Diagnosis Date   • Asthma    • Benign prostatic hyperplasia    • Chronic kidney disease    • Diabetes mellitus (HCC)    • Erectile dysfunction    • Family history of prostate cancer    • Hypertension    • Kidney stone    • Mixed hyperlipidemia    • Prostate cancer (HCC)      Past Surgical History:   Procedure Laterality Date   • CHOLECYSTECTOMY     • COLONOSCOPY     • EYE SURGERY Left     Detached retna   • CA BX PROSTATE STRTCTC SATURATION SAMPLING IMG GID N/A 08/17/2023    Procedure: TRANSPERINEAL MRI FUSION BIOPSY PROSTATE;  Surgeon: Destin Ambrose MD;  [facility];  Service: Urology   • CA EXC TUMOR SOFT TISSUE NECK/ANT THORAX SUBQ <3CM N/A 2/7/2025    Procedure: EXCISION BIOPSY LESION /MASS FACIAL/NECK - Posterior Skin Tag;  Surgeon: Randolph Santoro DO;  Location[facility];  Service: General   • CA EXCISION HIDRADENITIS AXILLARY SMPL/INTRM RPR Bilateral 2/7/2025    Procedure: EXCISION BIOPSY TISSUE LESION/MASS UPPER EXTREMITY - B/L Axilla Skin Tags;  Surgeon: Randolph Santoro DO;  [facility];  Service: General   • CA EXCISION TUMOR SOFT TISSUE BACK/FLANK SUBQ <3CM N/A 2/7/2025    Procedure: EXCISION  BIOPSY LESION/MASS BACK x 3;  Surgeon: Randolph Santoro DO;  Location[facility];  Service: General   • PROSTATE BIOPSY     • ROTATOR CUFF REPAIR Bilateral    • SHOULDER SURGERY Bilateral 2015   • VASECTOMY  1994     No Known Allergies    Current Outpatient  Medications:   •  amLODIPine (NORVASC) 5 mg tablet, Take 1 tablet (5 mg total) by mouth daily, Disp: 90 tablet, Rfl: 1  •  atorvastatin (LIPITOR) 20 mg tablet, Take 1 tablet (20 mg total) by mouth daily, Disp: 90 tablet, Rfl: 1  •  Difluprednate 0.05 % EMUL, INSTILL 1 DROP 4 TIMES DAILY INTO LEFT EYE (STARTING NOW , CONTINUING AFTER SURGERY)., Disp: , Rfl:   •  Dulaglutide 1.5 MG/0.5ML SOAJ, Inject 1.5 mg under the skin every 7 days, Disp: 6 mL, Rfl: 1  •  glucose blood test strip, Test once daily or as directed, Disp: 100 each, Rfl: 2  •  Lancets (onetouch ultrasoft) lancets, Test once daily or as directed, Disp: 100 each, Rfl: 3  •  losartan (COZAAR) 100 MG tablet, Take 1 tablet (100 mg total) by mouth daily, Disp: 90 tablet, Rfl: 1  •  metFORMIN (GLUCOPHAGE-XR) 500 mg 24 hr tablet, 1 tab po bid, Disp: 180 tablet, Rfl: 1  •  metoprolol succinate (TOPROL-XL) 25 mg 24 hr tablet, TAKE 1 & 1/2 (ONE & ONE-HALF) TABLETS BY MOUTH AT BEDTIME, Disp: 135 tablet, Rfl: 1  •  montelukast (SINGULAIR) 10 mg tablet, Take 1 tablet (10 mg total) by mouth daily at bedtime, Disp: 90 tablet, Rfl: 1  •  ofloxacin (OCUFLOX) 0.3 % ophthalmic solution, INSTILL 1 DROP 4 TIMES DAILY INTO LEFT EYE (STARTING AFTER SURGERY)., Disp: , Rfl:   •  omega-3-acid ethyl esters (LOVAZA) 1 g capsule, Take 1 capsule (1 g total) by mouth 2 (two) times a day, Disp: 180 capsule, Rfl: 1  •  sildenafil (REVATIO) 20 mg tablet, TAKE 2 TABLETS BY MOUTH ONCE DAILY 1  HOUR  BEFORE  INTERCOURSE., Disp: 60 tablet, Rfl: 1  •  polymyxin b-trimethoprim (POLYTRIM) ophthalmic solution, instill 1 drop into left eye 4 times daily (Patient not taking: Reported on 3/10/2025), Disp: , Rfl:   •  prednisoLONE acetate (PRED FORTE) 1 % ophthalmic suspension, instill 1 drop into left eye 4 times daily (Patient not taking: Reported on 3/10/2025), Disp: , Rfl:      Review of Systems    Consitutional:  Denies, chills, fatigue, and fever   ENT:  Positive for and blurry vision Denies,  "tinnitus, epistaxis, nasal discharge, nasal congestion, oral lesion (s), post nasal drip, and itchy/watery eyes  Pulmonary:  No cough, shortness of breath, dyspnea on exertion    Cardiovascular:  Denies chest pain/pressure   Abdomen:   Denies abdominal pain, nausea, vomiting, diarrhea, constipation    Genitourinary:  no urinary symptoms   Hematology/Lymphatics:   Denies, blood clots, bruising, jaundice   Musculoskeletal:  No gait disturbance, myalgia,  arthalgia or muscle weakness    Integumentary:  Denies ecchymosis, petechiae, rash or lesions   Neurological:  Denies headaches, dizziness, confusion, loss of consciousness or behavioral changes  Psychological:  Denies anxiety, depression or sleep disturbances      OBJECTIVE    /80 (BP Location: Left arm, Patient Position: Sitting, Cuff Size: Large)   Pulse 64   Temp 97.6 °F (36.4 °C) (Temporal)   Ht 5' 9\" (1.753 m)   Wt 95.9 kg (211 lb 6.4 oz)   SpO2 97%   BMI 31.22 kg/m²     Constitutional:  Well appearing and in no acute distress  ENT:  ENT exam normal, no neck nodes or sinus tenderness   Pulmonary:  clear to auscultation bilaterally  Cardiovascular:  S1S2, regular rate and rhythm  Gastrointestinal:  abdomen is soft without significant tenderness  no masses  no organmegaly  Lymphatic:  no lymphadenopathy   Musculoskeletal:  no joint tenderness, deformity or swelling, no muscular tenderness noted, full range of motion without pain  Skin:  not examined and warm, no rashes, no ecchymosis  Neurologic:  Alert and oriented x 4, No motor deficits, CN I-XII intact, Normal Reflexes, and Normal Sensation      DATA:  Laboratory Results: .   Lab Results   Component Value Date    ALT 25 11/08/2024    AST 19 11/08/2024    BUN 12 01/28/2025    CALCIUM 8.8 03/10/2019     01/28/2025    CO2 24 01/28/2025    CREATININE 1.15 01/28/2025    HDL 35 (L) 11/08/2024    HCT 44.7 11/08/2024    HGB 14.8 11/08/2024    HGBA1C 7.0 (A) 03/10/2025    MG 1.7 03/10/2019    PHOS 3.5 " 03/10/2019     11/08/2024    K 4.9 01/28/2025    PSA 3.8 01/28/2025    TRIG 100 11/08/2024    WBC 6.6 11/08/2024     ECG: n/a    Patient Clearance:Risk Estimation: per the Revised Cardiac Risk Index (Circ. 100:1043, 1999): the patient's risk factors for cardiac complications include:   RCI Risk Class: 1    Current medications which may produce withdrawal symptoms if withheld perioperatively:    Pre-op Evaluation Plan  1. Further preoperative work-up as follows:  2. Medication Management/Recommendations:  3. Prophylaxis for cardiac events with perioperative beta-blockers:     Clearance:  Patient is CLEARED for surgery without any additional cardiac testing      Assessment/Plan:  Diagnoses and all orders for this visit:    Preop examination    Cataract of left eye, unspecified cataract type    Type 2 diabetes mellitus with other specified complication, without long-term current use of insulin (HCC)  -     POCT hemoglobin A1c  -     CBC and differential; Future  -     Comprehensive metabolic panel; Future  -     Lipid panel; Future  -     TSH, 3rd generation with Free T4 reflex; Future  -     CBC and differential  -     Comprehensive metabolic panel  -     Lipid panel  -     TSH, 3rd generation with Free T4 reflex    Essential hypertension    Mixed hyperlipidemia    Screen for colon cancer  -     Cologuard    Other orders  -     ofloxacin (OCUFLOX) 0.3 % ophthalmic solution; INSTILL 1 DROP 4 TIMES DAILY INTO LEFT EYE (STARTING AFTER SURGERY).  -     Difluprednate 0.05 % EMUL; INSTILL 1 DROP 4 TIMES DAILY INTO LEFT EYE (STARTING NOW , CONTINUING AFTER SURGERY).      Pt medically cleared for L cataract surgery

## 2025-04-08 LAB — COLOGUARD RESULT REPORTABLE: NEGATIVE

## 2025-04-09 ENCOUNTER — RESULTS FOLLOW-UP (OUTPATIENT)
Dept: FAMILY MEDICINE CLINIC | Facility: CLINIC | Age: 65
End: 2025-04-09

## 2025-05-28 DIAGNOSIS — I10 ESSENTIAL HYPERTENSION: ICD-10-CM

## 2025-05-29 ENCOUNTER — TELEPHONE (OUTPATIENT)
Age: 65
End: 2025-05-29

## 2025-05-29 RX ORDER — SILDENAFIL CITRATE 20 MG/1
TABLET ORAL
Qty: 60 TABLET | Refills: 0 | Status: SHIPPED | OUTPATIENT
Start: 2025-05-29

## 2025-05-30 NOTE — TELEPHONE ENCOUNTER
Called patient and advised    
E-Prescribing Status: Receipt confirmed by pharmacy (5/29/2025  9:49 AM EDT)     [id number] [address]  [address] [facility] [place] [address]  Phone: [phone]  Fax: [phone]     IF PATIENT WOULD LIKE TO PURCHASE TABLETS THEY NEED TO CONTACT THE PHARMACY. PHARMACY ALREADY HAS PRESCRIPTION.  
PA for SILDENAFIL 20MG DENIED    Reason:(Screenshot if applicable)    The information received from your physician does not support approval of this drug under your Medicare Part D benefit because the Food and Drug Administration (FDA) has not approved the use of the requested medication for the diagnosis provided by your prescriber. Your plan's formulary has a restriction on this drug. This restriction has been approved by the Centers for Medicare and Medicaid Services (CMS). Coverage of the requested medication is provided when the Food and Drug Administration (FDA) has approved use of the requested medication for the diagnosis provided by your physician. The FDA approved use for this drug is pulmonary arterial hypertension (PAH) World Health Organization (WHO) Group 1. This drug must be prescribed by a physician who specializes in this type of treatment (cardiologist or pulmonologist) or your physician must have consulted with one of these experts before the drug will be covered. Coverage is provided only if your physician confirms you have had a right-heart catheterization to make sure the diagnosis is correct and if this drug will not be used concurrently with a guanylate cyclase stimulator. If the requested drug is sildenafil injection (Revatio or generic), coverage is provided only if you are unable to take a medication in this same therapy class by mouth    Message sent to office clinical pool Yes    Denial letter scanned into Media Yes    We can gladly do an appeal but the process can take about 30-60 days to provide determination. If an appeal is truly warranted please have Provider send clinical documentation to the PA department to support the appeal.     **Please follow up with your patient regarding denial and next steps**  
PA for SILDENAFIL 20MG SUBMITTED to [employer]    via      [x][employer] ID #     [x]PA sent as URGENT    All office notes, labs and other pertaining documents and studies sent. Clinical questions answered. Awaiting determination from insurance company.     Turnaround time for your insurance to make a decision on your Prior Authorization can take 7-21 business days.               
Patient is requesting the script for sildenafil 20 mg to be released to the pharmacy. He wants to pay for a few tablets until the PA determination is received.    
Statement Selected

## 2025-06-16 ENCOUNTER — OFFICE VISIT (OUTPATIENT)
Dept: FAMILY MEDICINE CLINIC | Facility: CLINIC | Age: 65
End: 2025-06-16
Payer: MEDICARE

## 2025-06-16 VITALS
SYSTOLIC BLOOD PRESSURE: 124 MMHG | HEIGHT: 69 IN | BODY MASS INDEX: 31.77 KG/M2 | WEIGHT: 214.5 LBS | HEART RATE: 74 BPM | DIASTOLIC BLOOD PRESSURE: 84 MMHG | OXYGEN SATURATION: 96 % | TEMPERATURE: 97.2 F

## 2025-06-16 DIAGNOSIS — E11.69 TYPE 2 DIABETES MELLITUS WITH OTHER SPECIFIED COMPLICATION, WITHOUT LONG-TERM CURRENT USE OF INSULIN (HCC): ICD-10-CM

## 2025-06-16 DIAGNOSIS — E11.51 TYPE II DIABETES MELLITUS WITH PERIPHERAL CIRCULATORY DISORDER (HCC): ICD-10-CM

## 2025-06-16 DIAGNOSIS — E78.2 MIXED HYPERLIPIDEMIA: ICD-10-CM

## 2025-06-16 DIAGNOSIS — I10 ESSENTIAL HYPERTENSION: ICD-10-CM

## 2025-06-16 DIAGNOSIS — Z00.00 WELCOME TO MEDICARE PREVENTIVE VISIT: Primary | ICD-10-CM

## 2025-06-16 DIAGNOSIS — J45.20 MILD INTERMITTENT ASTHMA, UNSPECIFIED WHETHER COMPLICATED: ICD-10-CM

## 2025-06-16 DIAGNOSIS — C61 PROSTATE CANCER (HCC): ICD-10-CM

## 2025-06-16 LAB
LEFT EYE DIABETIC RETINOPATHY: NORMAL
RIGHT EYE DIABETIC RETINOPATHY: NORMAL
SL AMB POCT HEMOGLOBIN AIC: 6.9 (ref ?–6.5)

## 2025-06-16 PROCEDURE — 99214 OFFICE O/P EST MOD 30 MIN: CPT | Performed by: FAMILY MEDICINE

## 2025-06-16 PROCEDURE — G0403 EKG FOR INITIAL PREVENT EXAM: HCPCS | Performed by: FAMILY MEDICINE

## 2025-06-16 PROCEDURE — G2211 COMPLEX E/M VISIT ADD ON: HCPCS | Performed by: FAMILY MEDICINE

## 2025-06-16 PROCEDURE — G0402 INITIAL PREVENTIVE EXAM: HCPCS | Performed by: FAMILY MEDICINE

## 2025-06-16 PROCEDURE — 83036 HEMOGLOBIN GLYCOSYLATED A1C: CPT | Performed by: FAMILY MEDICINE

## 2025-06-16 NOTE — PROGRESS NOTES
Name: Alvaro Steel      : 1960      MRN: [id number]  Encounter Provider: Fidel Almanzar MD  Encounter Date: 2025   Encounter department: [facility]  :  Assessment & Plan  Welcome to Medicare preventive visit  Orders:  •  POCT ECG    Type 2 diabetes mellitus with other specified complication, without long-term current use of insulin (HCC)  A1C borderline but is improving.  Continue present care. Monitor diet. Recheck 6m    Lab Results   Component Value Date    HGBA1C 6.9 (A) 2025   Orders:  •  POCT hemoglobin A1c  •  Albumin / creatinine urine ratio; Future    Essential hypertension  Well controlled. Cont present treatment. Monitor labs. Recheck 6m       Type II diabetes mellitus with peripheral circulatory disorder (HCC)  No claudication.  Continue present care.   Lab Results   Component Value Date    HGBA1C 6.9 (A) 2025        Mild intermittent asthma, unspecified whether complicated  Breathing stable. Continue present care       Prostate cancer (HCC)  PSA = 5.0 at time of dx, and since has decreased.  Now 3.8 which is unchanged from  PSA.  MRI in January was without focal lesion. Uro wanted him to have a repeat prostate Bx - pt unclear why and is reluctant.  On pt request, I will reach out to Uro to discuss.        Mixed hyperlipidemia  Due for repeat lipids. Continue atorvastatin.  Adjust dose if not at goal.           Preventive health issues were discussed with patient, and age appropriate screening tests were ordered as noted in patient's After Visit Summary. Personalized health advice and appropriate referrals for health education or preventive services given if needed, as noted in patient's After Visit Summary.    History of Present Illness     f/u multiple med issues and Welcome to Medicare  - pt is doing well  - still struggling with L vision. Up to date with ophth.  Had cornea and cataract surgery done. Still on steroid drops, however  vision remains poor.  Also suffering from low IOP  - pt doing Ok with diet. A1C in the office = 6.9 today.  Trying to lose weight  - pt continues to f/u with Uro. PSA unchnaged.  Interestingly, recent MRI did not show the lesion noted 2 years ago.  At that time, biopsy did show 2 small areas of Yeni (3+3 equal 6) prostate cancer.  Urology protocol is to repeat biopsy every 2 years.  Patient is not sure he would like to go through with this at this time.  - pt denies CP, palpitations, lightheadedness or other CV symptoms with or without exertion  - pt denies any new GI complaints  - pt was seen by ophth earlier today and had dilated exam.   - Welcome to Medicare         Patient Care Team:  Fidel Almanzar MD as PCP - General (Family Medicine)  Sil Krishnamurthy RD (Dietician)  Destin Ambrose MD (Urology)  Afshan Lilly MD (Radiation Oncology)    Review of Systems   Constitutional: Negative.    HENT: Negative.     Eyes:  Positive for visual disturbance.   Respiratory: Negative.     Cardiovascular: Negative.    Gastrointestinal: Negative.    Genitourinary: Negative.    Musculoskeletal: Negative.    Skin: Negative.    Neurological: Negative.    Psychiatric/Behavioral: Negative.       Medical History Reviewed by provider this encounter:  Tobacco  Allergies  Meds  Problems  Med Hx  Surg Hx  Fam Hx       Annual Wellness Visit Questionnaire   Alvaro is here for his Welcome to Medicare visit.     Health Risk Assessment:   Patient rates overall health as very good. Patient feels that their physical health rating is same. Patient is very satisfied with their life. Eyesight was rated as much worse. Hearing was rated as same. Patient feels that their emotional and mental health rating is same. Patients states they are never, rarely angry. Patient states they are never, rarely unusually tired/fatigued. Pain experienced in the last 7 days has been none. Patient states that he has experienced no  "weight loss or gain in last 6 months. Has \"lazy days\"    Depression Screening:   PHQ-2 Score: 0      Fall Risk Screening:   In the past year, patient has experienced: no history of falling in past year      Home Safety:  Patient does not have trouble with stairs inside or outside of their home. Patient has working smoke alarms and has working carbon monoxide detector. Home safety hazards include: none.     Nutrition:   Current diet is Regular and Diabetic.     Medications:   Patient is currently taking over-the-counter supplements. OTC medications include: see medication list. Patient is able to manage medications.     Activities of Daily Living (ADLs)/Instrumental Activities of Daily Living (IADLs):   Walk and transfer into and out of bed and chair?: Yes  Dress and groom yourself?: Yes    Bathe or shower yourself?: Yes    Feed yourself? Yes  Do your laundry/housekeeping?: Yes  Manage your money, pay your bills and track your expenses?: Yes  Make your own meals?: Yes    Do your own shopping?: Yes    Previous Hospitalizations:   Any hospitalizations or ED visits within the last 12 months?: No      Advance Care Planning:   Living will: Yes    Durable POA for healthcare: Yes    Advanced directive: Yes    Advanced directive counseling given: Yes      Cognitive Screening:   Provider or family/friend/caregiver concerned regarding cognition?: No    Preventive Screenings      Cardiovascular Screening:    General: Screening Not Indicated and History Lipid Disorder      Diabetes Screening:     General: Screening Not Indicated and History Diabetes      Colorectal Cancer Screening:     General: Screening Current      Prostate Cancer Screening:    General: History Prostate Cancer      Osteoporosis Screening:    General: Screening Not Indicated      Abdominal Aortic Aneurysm (AAA) Screening:    Risk factors include: age between 65-76 yo        Lung Cancer Screening:     General: Screening Not Indicated      Hepatitis C " "Screening:    General: Screening Current    Immunizations:  - Immunizations due: Prevnar 20 and Zoster (Shingrix)    Screening, Brief Intervention, and Referral to Treatment (SBIRT)     Screening      AUDIT-C Screenin) How often did you have a drink containing alcohol in the past year? monthly or less  2) How many drinks did you have on a typical day when you were drinking in the past year? 1 to 2  3) How often did you have 6 or more drinks on one occasion in the past year? never    AUDIT-C Score: 1  Interpretation: Score 0-3 (male): Negative screen for alcohol misuse    Single Item Drug Screening:  How often have you used an illegal drug (including marijuana) or a prescription medication for non-medical reasons in the past year? never    Single Item Drug Screen Score: 0  Interpretation: Negative screen for possible drug use disorder    Time Spent  Time spent screening/evaluating the patient for alcohol misuse: 1 minutes.     Other Counseling Topics:   Calcium and vitamin D intake and regular weightbearing exercise.     Social Drivers of Health     Food Insecurity: No Food Insecurity (2025)    Nursing - Inadequate Food Risk Classification    • Worried About Running Out of Food in the Last Year: Never true    • Ran Out of Food in the Last Year: Never true   Transportation Needs: No Transportation Needs (2025)    PRAPARE - Transportation    • Lack of Transportation (Medical): No    • Lack of Transportation (Non-Medical): No   Housing Stability: Low Risk  (2025)    Housing Stability Vital Sign    • Unable to Pay for Housing in the Last Year: No    • Number of Times Moved in the Last Year: 0    • Homeless in the Last Year: No   Utilities: Not At Risk (2025)    [facility] Utilities    • Threatened with loss of utilities: No     Vision Screening (Inadequate exam)   Comments: Unable to obtain due to eye dilation.      Objective   /84   Pulse 74   Temp (!) 97.2 °F (36.2 °C)   Ht 5' 9\" (1.753 m)   " Wt 97.3 kg (214 lb 8 oz)   SpO2 96%   BMI 31.68 kg/m²     Physical Exam  Vitals reviewed.   Constitutional:       Appearance: He is well-developed.   HENT:      Head: Normocephalic and atraumatic.      Right Ear: Tympanic membrane, ear canal and external ear normal.      Left Ear: Tympanic membrane, ear canal and external ear normal.      Nose: Nose normal.      Mouth/Throat:      Mouth: Mucous membranes are moist.     Eyes:      Extraocular Movements: Extraocular movements intact.      Conjunctiva/sclera: Conjunctivae normal.      Comments: Eye mildly dilated   Neck:      Thyroid: No thyromegaly.      Vascular: No JVD.     Cardiovascular:      Rate and Rhythm: Normal rate and regular rhythm.      Pulses: Normal pulses. no weak pulses.           Dorsalis pedis pulses are 2+ on the right side and 2+ on the left side.      Heart sounds: Normal heart sounds. No murmur heard.  Pulmonary:      Effort: Pulmonary effort is normal. No respiratory distress.      Breath sounds: Normal breath sounds. No wheezing or rales.   Abdominal:      General: Bowel sounds are normal. There is no distension.      Palpations: Abdomen is soft. There is no mass.      Tenderness: There is no abdominal tenderness.     Musculoskeletal:         General: No swelling, tenderness or deformity. Normal range of motion.      Cervical back: Normal range of motion and neck supple. No tenderness. No muscular tenderness.      Right lower leg: No edema.      Left lower leg: No edema.   Feet:      Right foot:      Skin integrity: No ulcer, skin breakdown, erythema, warmth, callus or dry skin.      Left foot:      Skin integrity: No ulcer, skin breakdown, erythema, warmth, callus or dry skin.   Lymphadenopathy:      Cervical: No cervical adenopathy.     Skin:     General: Skin is warm.      Capillary Refill: Capillary refill takes less than 2 seconds.     Neurological:      Mental Status: He is alert and oriented to person, place, and time.      Cranial  Nerves: No cranial nerve deficit.      Sensory: No sensory deficit.      Motor: No weakness or abnormal muscle tone.      Coordination: Coordination normal (normal Romberg).      Gait: Gait normal.      Deep Tendon Reflexes: Reflexes normal.      Comments: Minicog 5/5   Psychiatric:         Mood and Affect: Mood normal.         Behavior: Behavior normal.         Thought Content: Thought content normal.         Judgment: Judgment normal.      Comments: PHQ-2/9 Depression Screening    Little interest or pleasure in doing things: 0 - not at all  Feeling down, depressed, or hopeless: 0 - not at all  PHQ-2 Score: 0  PHQ-2 Interpretation: Negative depression screen            Patient's shoes and socks removed.    Right Foot/Ankle   Right Foot Inspection  Skin Exam: skin normal and skin intact. No dry skin, no warmth, no callus, no erythema, no maceration, no abnormal color, no pre-ulcer, no ulcer and no callus.     Toe Exam: ROM and strength within normal limits.     Sensory   Vibration: intact  Monofilament testing: intact    Vascular  Capillary refills: < 3 seconds  The right DP pulse is 2+.     Left Foot/Ankle  Left Foot Inspection  Skin Exam: skin normal and skin intact. No dry skin, no warmth, no erythema, no maceration, normal color, no pre-ulcer, no ulcer and no callus.     Toe Exam: ROM and strength within normal limits.     Sensory   Vibration: intact  Monofilament testing: intact    Vascular  Capillary refills: < 3 seconds  The left DP pulse is 2+.     Assign Risk Category  No deformity present  No loss of protective sensation  No weak pulses  Risk: 0

## 2025-06-16 NOTE — ASSESSMENT & PLAN NOTE
No claudication.  Continue present care.   Lab Results   Component Value Date    HGBA1C 6.9 (A) 06/16/2025

## 2025-06-16 NOTE — PATIENT INSTRUCTIONS
Medicare Preventive Visit Patient Instructions  Thank you for completing your Welcome to Medicare Visit or Medicare Annual Wellness Visit today. Your next wellness visit will be due in one year (6/17/2026).  The screening/preventive services that you may require over the next 5-10 years are detailed below. Some tests may not apply to you based off risk factors and/or age. Screening tests ordered at today's visit but not completed yet may show as past due. Also, please note that scanned in results may not display below.  Preventive Screenings:  Service Recommendations Previous Testing/Comments   Colorectal Cancer Screening  Colonoscopy    Fecal Occult Blood Test (FOBT)/Fecal Immunochemical Test (FIT)  Fecal DNA/Cologuard Test  Flexible Sigmoidoscopy Age: 45-75 years old   Colonoscopy: every 10 years (May be performed more frequently if at higher risk)  OR  FOBT/FIT: every 1 year  OR  Cologuard: every 3 years  OR  Sigmoidoscopy: every 5 years  Screening may be recommended earlier than age 45 if at higher risk for colorectal cancer. Also, an individualized decision between you and your healthcare provider will decide whether screening between the ages of 76-85 would be appropriate. Colonoscopy: 10/29/2019  FOBT/FIT: Not on file  Cologuard: 04/02/2025  Sigmoidoscopy: Not on file    Screening Current     Prostate Cancer Screening Individualized decision between patient and health care provider in men between ages of 55-69   Medicare will cover every 12 months beginning on the day after your 50th birthday PSA: 3.8 ng/mL     History Prostate Cancer     Hepatitis C Screening Once for adults born between 1945 and 1965  More frequently in patients at high risk for Hepatitis C Hep C Antibody: 01/31/2020    Screening Current   Diabetes Screening 1-2 times per year if you're at risk for diabetes or have pre-diabetes Fasting glucose: No results in last 5 years (No results in last 5 years)  A1C: 7.0 (3/10/2025)  Screening Not  Indicated  History Diabetes   Cholesterol Screening Once every 5 years if you don't have a lipid disorder. May order more often based on risk factors. Lipid panel: 11/08/2024  Screening Not Indicated  History Lipid Disorder      Other Preventive Screenings Covered by Medicare:  Abdominal Aortic Aneurysm (AAA) Screening: covered once if your at risk. You're considered to be at risk if you have a family history of AAA or a male between the age of 65-75 who smoking at least 100 cigarettes in your lifetime.  Lung Cancer Screening: covers low dose CT scan once per year if you meet all of the following conditions: (1) Age 55-77; (2) No signs or symptoms of lung cancer; (3) Current smoker or have quit smoking within the last 15 years; (4) You have a tobacco smoking history of at least 20 pack years (packs per day x number of years you smoked); (5) You get a written order from a healthcare provider.  Glaucoma Screening: covered annually if you're considered high risk: (1) You have diabetes OR (2) Family history of glaucoma OR (3)  aged 50 and older OR (4)  American aged 65 and older  Osteoporosis Screening: covered every 2 years if you meet one of the following conditions: (1) Have a vertebral abnormality; (2) On glucocorticoid therapy for more than 3 months; (3) Have primary hyperparathyroidism; (4) On osteoporosis medications and need to assess response to drug therapy.  HIV Screening: covered annually if you're between the age of 15-65. Also covered annually if you are younger than 15 and older than 65 with risk factors for HIV infection. For pregnant patients, it is covered up to 3 times per pregnancy.    Immunizations:  Immunization Recommendations   Influenza Vaccine Annual influenza vaccination during flu season is recommended for all persons aged >= 6 months who do not have contraindications   Pneumococcal Vaccine   * Pneumococcal conjugate vaccine = PCV13 (Prevnar 13), PCV15 (Vaxneuvance),  PCV20 (Prevnar 20)  * Pneumococcal polysaccharide vaccine = PPSV23 (Pneumovax) Adults 19-63 yo with certain risk factors or if 65+ yo  If never received any pneumonia vaccine: recommend Prevnar 20 (PCV20)  Give PCV20 if previously received 1 dose of PCV13 or PPSV23   Hepatitis B Vaccine 3 dose series if at intermediate or high risk (ex: diabetes, end stage renal disease, liver disease)   Respiratory syncytial virus (RSV) Vaccine - COVERED BY MEDICARE PART D  * RSVPreF3 (Arexvy) CDC recommends that adults 60 years of age and older may receive a single dose of RSV vaccine using shared clinical decision-making (SCDM)   Tetanus (Td) Vaccine - COST NOT COVERED BY MEDICARE PART B Following completion of primary series, a booster dose should be given every 10 years to maintain immunity against tetanus. Td may also be given as tetanus wound prophylaxis.   Tdap Vaccine - COST NOT COVERED BY MEDICARE PART B Recommended at least once for all adults. For pregnant patients, recommended with each pregnancy.   Shingles Vaccine (Shingrix) - COST NOT COVERED BY MEDICARE PART B  2 shot series recommended in those 19 years and older who have or will have weakened immune systems or those 50 years and older     Health Maintenance Due:      Topic Date Due   • Colorectal Cancer Screening  04/03/2025   • HIV Screening  Completed   • Hepatitis C Screening  Completed     Immunizations Due:      Topic Date Due   • Pneumococcal Vaccine: 50+ Years (1 of 2 - PCV) Never done   • COVID-19 Vaccine (3 - 2024-25 season) 09/01/2024   • Influenza Vaccine (Season Ended) 09/01/2025     Advance Directives   What are advance directives?  Advance directives are legal documents that state your wishes and plans for medical care. These plans are made ahead of time in case you lose your ability to make decisions for yourself. Advance directives can apply to any medical decision, such as the treatments you want, and if you want to donate organs.   What are the  types of advance directives?  There are many types of advance directives, and each state has rules about how to use them. You may choose a combination of any of the following:  Living will:  This is a written record of the treatment you want. You can also choose which treatments you do not want, which to limit, and which to stop at a certain time. This includes surgery, medicine, IV fluid, and tube feedings.   Durable power of  for healthcare (DPAHC):  This is a written record that states who you want to make healthcare choices for you when you are unable to make them for yourself. This person, called a proxy, is usually a family member or a friend. You may choose more than 1 proxy.  Do not resuscitate (DNR) order:  A DNR order is used in case your heart stops beating or you stop breathing. It is a request not to have certain forms of treatment, such as CPR. A DNR order may be included in other types of advance directives.  Medical directive:  This covers the care that you want if you are in a coma, near death, or unable to make decisions for yourself. You can list the treatments you want for each condition. Treatment may include pain medicine, surgery, blood transfusions, dialysis, IV or tube feedings, and a ventilator (breathing machine).  Values history:  This document has questions about your views, beliefs, and how you feel and think about life. This information can help others choose the care that you would choose.  Why are advance directives important?  An advance directive helps you control your care. Although spoken wishes may be used, it is better to have your wishes written down. Spoken wishes can be misunderstood, or not followed. Treatments may be given even if you do not want them. An advance directive may make it easier for your family to make difficult choices about your care.   Weight Management   Why it is important to manage your weight:  Being overweight increases your risk of health  conditions such as heart disease, high blood pressure, type 2 diabetes, and certain types of cancer. It can also increase your risk for osteoarthritis, sleep apnea, and other respiratory problems. Aim for a slow, steady weight loss. Even a small amount of weight loss can lower your risk of health problems.  How to lose weight safely:  A safe and healthy way to lose weight is to eat fewer calories and get regular exercise. You can lose up about 1 pound a week by decreasing the number of calories you eat by 500 calories each day.   Healthy meal plan for weight management:  A healthy meal plan includes a variety of foods, contains fewer calories, and helps you stay healthy. A healthy meal plan includes the following:  Eat whole-grain foods more often.  A healthy meal plan should contain fiber. Fiber is the part of grains, fruits, and vegetables that is not broken down by your body. Whole-grain foods are healthy and provide extra fiber in your diet. Some examples of whole-grain foods are whole-wheat breads and pastas, oatmeal, brown rice, and bulgur.  Eat a variety of vegetables every day.  Include dark, leafy greens such as spinach, kale, kelsie greens, and mustard greens. Eat yellow and orange vegetables such as carrots, sweet potatoes, and winter squash.   Eat a variety of fruits every day.  Choose fresh or canned fruit (canned in its own juice or light syrup) instead of juice. Fruit juice has very little or no fiber.  Eat low-fat dairy foods.  Drink fat-free (skim) milk or 1% milk. Eat fat-free yogurt and low-fat cottage cheese. Try low-fat cheeses such as mozzarella and other reduced-fat cheeses.  Choose meat and other protein foods that are low in fat.  Choose beans or other legumes such as split peas or lentils. Choose fish, skinless poultry (chicken or turkey), or lean cuts of red meat (beef or pork). Before you cook meat or poultry, cut off any visible fat.   Use less fat and oil.  Try baking foods instead of  frying them. Add less fat, such as margarine, sour cream, regular salad dressing and mayonnaise to foods. Eat fewer high-fat foods. Some examples of high-fat foods include french fries, doughnuts, ice cream, and cakes.  Eat fewer sweets.  Limit foods and drinks that are high in sugar. This includes candy, cookies, regular soda, and sweetened drinks.  Exercise:  Exercise at least 30 minutes per day on most days of the week. Some examples of exercise include walking, biking, dancing, and swimming. You can also fit in more physical activity by taking the stairs instead of the elevator or parking farther away from stores. Ask your healthcare provider about the best exercise plan for you.    © Copyright [employer] 2018 Information is for End User's use only and may not be sold, redistributed or otherwise used for commercial purposes. All illustrations and images included in CareNotes® are the copyrighted property of [employer].A[employer]M., Inc. or [employer]

## 2025-06-16 NOTE — ASSESSMENT & PLAN NOTE
A1C borderline but is improving.  Continue present care. Monitor diet. Recheck 6m    Lab Results   Component Value Date    HGBA1C 6.9 (A) 06/16/2025   Orders:  •  POCT hemoglobin A1c  •  Albumin / creatinine urine ratio; Future

## 2025-06-17 ENCOUNTER — VBI (OUTPATIENT)
Dept: ADMINISTRATIVE | Facility: OTHER | Age: 65
End: 2025-06-17

## 2025-06-17 NOTE — TELEPHONE ENCOUNTER
06/17/25 8:56 AM     Chart reviewed for Diabetic Eye Exam ; nothing is submitted to the patient's insurance at this time.     Good Ramirez MA   [facility]

## 2025-07-01 LAB
ALBUMIN SERPL-MCNC: 4.4 G/DL (ref 3.9–4.9)
ALP SERPL-CCNC: 63 IU/L (ref 44–121)
ALT SERPL-CCNC: 13 IU/L (ref 0–44)
AST SERPL-CCNC: 19 IU/L (ref 0–40)
BASOPHILS # BLD AUTO: 0.1 X10E3/UL (ref 0–0.2)
BASOPHILS NFR BLD AUTO: 1 %
BILIRUB SERPL-MCNC: 0.5 MG/DL (ref 0–1.2)
BUN SERPL-MCNC: 16 MG/DL (ref 8–27)
BUN/CREAT SERPL: 14 (ref 10–24)
CALCIUM SERPL-MCNC: 9.7 MG/DL (ref 8.6–10.2)
CHLORIDE SERPL-SCNC: 105 MMOL/L (ref 96–106)
CHOLEST SERPL-MCNC: 122 MG/DL (ref 100–199)
CHOLEST/HDLC SERPL: 3.3 RATIO (ref 0–5)
CO2 SERPL-SCNC: 23 MMOL/L (ref 20–29)
CREAT SERPL-MCNC: 1.17 MG/DL (ref 0.76–1.27)
EGFR: 69 ML/MIN/1.73
EOSINOPHIL # BLD AUTO: 0.9 X10E3/UL (ref 0–0.4)
EOSINOPHIL NFR BLD AUTO: 11 %
ERYTHROCYTE [DISTWIDTH] IN BLOOD BY AUTOMATED COUNT: 13.1 % (ref 11.6–15.4)
GLOBULIN SER-MCNC: 2.3 G/DL (ref 1.5–4.5)
GLUCOSE SERPL-MCNC: 129 MG/DL (ref 70–99)
HCT VFR BLD AUTO: 46.5 % (ref 37.5–51)
HDLC SERPL-MCNC: 37 MG/DL
HGB BLD-MCNC: 14.9 G/DL (ref 13–17.7)
IMM GRANULOCYTES # BLD: 0 X10E3/UL (ref 0–0.1)
IMM GRANULOCYTES NFR BLD: 0 %
LDLC SERPL CALC-MCNC: 61 MG/DL (ref 0–99)
LYMPHOCYTES # BLD AUTO: 2.4 X10E3/UL (ref 0.7–3.1)
LYMPHOCYTES NFR BLD AUTO: 30 %
MCH RBC QN AUTO: 30.9 PG (ref 26.6–33)
MCHC RBC AUTO-ENTMCNC: 32 G/DL (ref 31.5–35.7)
MCV RBC AUTO: 97 FL (ref 79–97)
MONOCYTES # BLD AUTO: 0.6 X10E3/UL (ref 0.1–0.9)
MONOCYTES NFR BLD AUTO: 8 %
NEUTROPHILS # BLD AUTO: 3.9 X10E3/UL (ref 1.4–7)
NEUTROPHILS NFR BLD AUTO: 50 %
PLATELET # BLD AUTO: 249 X10E3/UL (ref 150–450)
POTASSIUM SERPL-SCNC: 4.6 MMOL/L (ref 3.5–5.2)
PROT SERPL-MCNC: 6.7 G/DL (ref 6–8.5)
RBC # BLD AUTO: 4.82 X10E6/UL (ref 4.14–5.8)
SL AMB VLDL CHOLESTEROL CALC: 24 MG/DL (ref 5–40)
SODIUM SERPL-SCNC: 143 MMOL/L (ref 134–144)
TRIGL SERPL-MCNC: 138 MG/DL (ref 0–149)
TSH SERPL DL<=0.005 MIU/L-ACNC: 1.59 UIU/ML (ref 0.45–4.5)
WBC # BLD AUTO: 7.9 X10E3/UL (ref 3.4–10.8)

## 2025-07-18 ENCOUNTER — DOCUMENTATION (OUTPATIENT)
Dept: ADMINISTRATIVE | Facility: OTHER | Age: 65
End: 2025-07-18

## 2025-07-18 NOTE — PROGRESS NOTES
07/18/25 8:24 AM    A non-HM document was received and has been uploaded to the patient chart.    Thank you.  Partha Acuna MA  [facility]

## 2025-07-23 DIAGNOSIS — E11.69 TYPE 2 DIABETES MELLITUS WITH OTHER SPECIFIED COMPLICATION, WITHOUT LONG-TERM CURRENT USE OF INSULIN (HCC): ICD-10-CM

## 2025-07-25 RX ORDER — DULAGLUTIDE 1.5 MG/.5ML
INJECTION, SOLUTION SUBCUTANEOUS
Qty: 12 ML | Refills: 1 | Status: SHIPPED | OUTPATIENT
Start: 2025-07-25

## 2025-08-21 LAB — PSA SERPL-MCNC: 4.5 NG/ML (ref 0–4)

## (undated) DEVICE — CHLORAPREP HI-LITE 26ML ORANGE

## (undated) DEVICE — GLOVE INDICATOR PI UNDERGLOVE SZ 7 BLUE

## (undated) DEVICE — PAD GROUNDING DUAL ADULT

## (undated) DEVICE — MAX-CORE® DISPOSABLE CORE BIOPSY INSTRUMENT, 18G X 25CM: Brand: MAX-CORE

## (undated) DEVICE — PLASTIC ADHESIVE BANDAGE: Brand: CURITY

## (undated) DEVICE — 3M™ TEGADERM™ CHG DRESSING 25/CARTON 4 CARTONS/CASE 1659: Brand: TEGADERM™

## (undated) DEVICE — HEAVY DUTY TABLE COVER: Brand: CONVERTORS

## (undated) DEVICE — SYSTEM TRANSPERINEAL ACCESS PRECISIONPOINT

## (undated) DEVICE — SCD SEQUENTIAL COMPRESSION COMFORT SLEEVE MEDIUM KNEE LENGTH: Brand: KENDALL SCD

## (undated) DEVICE — NEEDLE 25G X 1 1/2

## (undated) DEVICE — POV-IOD SWAB STICKS

## (undated) DEVICE — GAUZE SPONGES,USP TYPE VII GAUZE, 12 PLY: Brand: CURITY

## (undated) DEVICE — PLUMEPEN PRO 10FT

## (undated) DEVICE — 3M™ IOBAN™ 2 ANTIMICROBIAL INCISE DRAPE 6640EZ: Brand: IOBAN™ 2

## (undated) DEVICE — POOLE SUCTION HANDLE: Brand: CARDINAL HEALTH

## (undated) DEVICE — INTENDED FOR TISSUE SEPARATION, AND OTHER PROCEDURES THAT REQUIRE A SHARP SURGICAL BLADE TO PUNCTURE OR CUT.: Brand: BARD-PARKER SAFETY BLADES SIZE 15, STERILE

## (undated) DEVICE — ELECTRODE NEEDLE MOD E-Z CLEAN 2.75IN 7CM -0013M

## (undated) DEVICE — GLOVE SRG BIOGEL 7

## (undated) DEVICE — TUBING SUCTION 5MM X 12 FT

## (undated) DEVICE — PACK UNIVERSAL NECK

## (undated) DEVICE — SUT PROLENE 2-0 SH 30 IN 8833H

## (undated) DEVICE — PREP PAD BNS: Brand: CONVERTORS

## (undated) DEVICE — GAUZE SPONGES,8 PLY: Brand: CURITY

## (undated) DEVICE — BETHLEHEM UNIVERSAL MINOR GEN: Brand: CARDINAL HEALTH

## (undated) DEVICE — GAUZE SPONGES,16 PLY: Brand: CURITY

## (undated) DEVICE — RAZOR STERILE

## (undated) DEVICE — CHLORAPREP HI-LITE 10.5ML ORANGE

## (undated) DEVICE — TELFA NON-ADHERENT ABSORBENT DRESSING: Brand: TELFA

## (undated) DEVICE — SYRINGE 10ML LL

## (undated) DEVICE — 4-PORT MANIFOLD: Brand: NEPTUNE 2

## (undated) DEVICE — DRAPE EQUIPMENT RF WAND

## (undated) DEVICE — ULTRASOUND GEL STERILE FOIL PK

## (undated) DEVICE — UTILITY MARKER,BLACK WITH LABELS: Brand: DEVON

## (undated) DEVICE — SPECIMEN CONTAINER STERILE PEEL PACK